# Patient Record
Sex: FEMALE | Race: WHITE | Employment: OTHER | URBAN - NONMETROPOLITAN AREA
[De-identification: names, ages, dates, MRNs, and addresses within clinical notes are randomized per-mention and may not be internally consistent; named-entity substitution may affect disease eponyms.]

---

## 2017-06-09 ENCOUNTER — OFFICE VISIT (OUTPATIENT)
Dept: PULMONOLOGY | Age: 57
End: 2017-06-09

## 2017-06-09 VITALS
DIASTOLIC BLOOD PRESSURE: 72 MMHG | HEIGHT: 65 IN | BODY MASS INDEX: 34.52 KG/M2 | HEART RATE: 85 BPM | WEIGHT: 207.2 LBS | SYSTOLIC BLOOD PRESSURE: 130 MMHG | OXYGEN SATURATION: 99 %

## 2017-06-09 DIAGNOSIS — J30.89 OTHER ALLERGIC RHINITIS: ICD-10-CM

## 2017-06-09 DIAGNOSIS — G47.33 OSA ON CPAP: ICD-10-CM

## 2017-06-09 DIAGNOSIS — G47.61 PLMD (PERIODIC LIMB MOVEMENT DISORDER): ICD-10-CM

## 2017-06-09 DIAGNOSIS — Z99.89 OSA ON CPAP: ICD-10-CM

## 2017-06-09 PROCEDURE — 99213 OFFICE O/P EST LOW 20 MIN: CPT | Performed by: INTERNAL MEDICINE

## 2017-06-09 RX ORDER — FLUTICASONE PROPIONATE 50 MCG
2 SPRAY, SUSPENSION (ML) NASAL DAILY
Qty: 3 BOTTLE | Refills: 3 | Status: SHIPPED | OUTPATIENT
Start: 2017-06-09 | End: 2018-06-10 | Stop reason: SDUPTHER

## 2017-09-14 ENCOUNTER — TELEPHONE (OUTPATIENT)
Dept: PULMONOLOGY | Age: 57
End: 2017-09-14

## 2017-09-14 DIAGNOSIS — Z99.89 OSA ON CPAP: Primary | ICD-10-CM

## 2017-09-14 DIAGNOSIS — G47.33 OSA ON CPAP: Primary | ICD-10-CM

## 2017-09-14 NOTE — TELEPHONE ENCOUNTER
Patient called in stating that she received a new machine which usue smaller tubing than her old machine, so would like to have her pressure increased because she does not feel that she is getting enough air with the current settings and tubing. Pt states is now using [facility].

## 2017-09-22 ENCOUNTER — OFFICE VISIT (OUTPATIENT)
Dept: PULMONOLOGY | Age: 57
End: 2017-09-22
Payer: COMMERCIAL

## 2017-09-22 VITALS
OXYGEN SATURATION: 97 % | BODY MASS INDEX: 33.66 KG/M2 | SYSTOLIC BLOOD PRESSURE: 128 MMHG | HEIGHT: 65 IN | HEART RATE: 84 BPM | DIASTOLIC BLOOD PRESSURE: 82 MMHG | WEIGHT: 202 LBS

## 2017-09-22 DIAGNOSIS — G47.33 OSA ON CPAP: ICD-10-CM

## 2017-09-22 DIAGNOSIS — G47.61 PLMD (PERIODIC LIMB MOVEMENT DISORDER): ICD-10-CM

## 2017-09-22 DIAGNOSIS — Z99.89 OSA ON CPAP: ICD-10-CM

## 2017-09-22 PROCEDURE — 99213 OFFICE O/P EST LOW 20 MIN: CPT | Performed by: INTERNAL MEDICINE

## 2017-09-22 RX ORDER — FLUCONAZOLE 150 MG/1
150 TABLET ORAL ONCE
COMMUNITY
End: 2018-09-24

## 2018-07-12 ENCOUNTER — HOSPITAL ENCOUNTER (OUTPATIENT)
Dept: WOMENS IMAGING | Age: 58
Discharge: HOME | End: 2018-07-12
Payer: COMMERCIAL

## 2018-07-12 DIAGNOSIS — Z12.39 BREAST SCREENING: ICD-10-CM

## 2018-07-12 PROCEDURE — 77067 SCR MAMMO BI INCL CAD: CPT

## 2018-07-12 PROCEDURE — 77080 DXA BONE DENSITY AXIAL: CPT

## 2018-09-24 ENCOUNTER — OFFICE VISIT (OUTPATIENT)
Dept: PULMONOLOGY | Age: 58
End: 2018-09-24
Payer: COMMERCIAL

## 2018-09-24 VITALS
BODY MASS INDEX: 34.16 KG/M2 | HEART RATE: 72 BPM | OXYGEN SATURATION: 96 % | HEIGHT: 65 IN | DIASTOLIC BLOOD PRESSURE: 82 MMHG | SYSTOLIC BLOOD PRESSURE: 128 MMHG | WEIGHT: 205 LBS

## 2018-09-24 DIAGNOSIS — G47.61 PLMD (PERIODIC LIMB MOVEMENT DISORDER): ICD-10-CM

## 2018-09-24 DIAGNOSIS — Z99.89 OBSTRUCTIVE SLEEP APNEA ON CPAP: Primary | ICD-10-CM

## 2018-09-24 DIAGNOSIS — G47.33 OBSTRUCTIVE SLEEP APNEA ON CPAP: Primary | ICD-10-CM

## 2018-09-24 DIAGNOSIS — J30.89 OTHER ALLERGIC RHINITIS: ICD-10-CM

## 2018-09-24 PROCEDURE — 99213 OFFICE O/P EST LOW 20 MIN: CPT | Performed by: PHYSICIAN ASSISTANT

## 2018-09-24 RX ORDER — FLUTICASONE PROPIONATE 50 MCG
SPRAY, SUSPENSION (ML) NASAL
Qty: 3 BOTTLE | Refills: 3 | Status: SHIPPED | OUTPATIENT
Start: 2018-09-24 | End: 2019-09-11 | Stop reason: SDUPTHER

## 2018-09-24 ASSESSMENT — ENCOUNTER SYMPTOMS
GASTROINTESTINAL NEGATIVE: 1
HEARTBURN: 0
COUGH: 0
RESPIRATORY NEGATIVE: 1
SORE THROAT: 0
SHORTNESS OF BREATH: 0
WHEEZING: 0
NAUSEA: 0
ORTHOPNEA: 0
EYES NEGATIVE: 1
SINUS PAIN: 0
SPUTUM PRODUCTION: 0

## 2018-09-24 NOTE — PROGRESS NOTES
[facility]      Sienna Raza                                         [id number]  9/24/2018   Chief Complaint   Patient presents with    Sleep Apnea     1 year DEBBIE follow up  with download        Pt of Dr. Arun Pardo    PAP Download:   Zandra Badillo or initial  AHI: 8.6    Date of initial study: 8/10/07     [x] Compliant  100%   []  Noncompliant 0 %     PAP Type CPAP   Level  13 cmH2o  Avg Hrs/Day 8:20  AHI: 1.9     Recorded compliance dates , 8/21/18 to 9/19/18  Machine/Mfg: ResMed   Interface: FFM    Provider:  [][facility]  [][facility]  [][facility]  [x][facility]         [][facility] []Other:   Neck Size: 15.5  Mallampati 3  ESS: 9    Here is a scan of the most recent download:              Presentation:   Ava Gandhi presents for sleep medicine follow up for obstructive sleep apnea  Since the last visit, Ava Gandhi is doing reasonably well with their sleep machine. Other comments: She is doing well with PAP. She is tolerating Sinemet for RLS. Equipment issues: The pressure is  acceptable, the mask is acceptable and she  is  using the humidity. Sleep issues:  Do you feel better? Yes  More rested? Yes   Better concentration? yes    Progress History:   Since last visit any new medical issues? Yes heart murmur  New ER or hospitlal visits? No  Any new or changes in medicines? No  Any new sleep medicines?  No        Past Medical History:   Diagnosis Date    Anemia     Backache     Depression     Diabetes mellitus ([address]     Eczema     Fibromyalgia     Fracture     Hyperlipidemia     Hypertension     Hypothyroidism     Metabolic syndrome     Migraine     Sinusitis     Sleep apnea        Past Surgical History:   Procedure Laterality Date    ARM SURGERY      BONE GRAFT  2007    Dr. Esteban Poag  2003    Dr. Nate Regalado    [facility] Dr. Christiano Tapia

## 2019-08-06 ENCOUNTER — HOSPITAL ENCOUNTER (OUTPATIENT)
Dept: WOMENS IMAGING | Age: 59
Discharge: HOME | End: 2019-08-06
Payer: COMMERCIAL

## 2019-08-06 DIAGNOSIS — Z12.31 VISIT FOR SCREENING MAMMOGRAM: ICD-10-CM

## 2019-08-06 PROCEDURE — 77067 SCR MAMMO BI INCL CAD: CPT

## 2019-08-09 ENCOUNTER — HOSPITAL ENCOUNTER (OUTPATIENT)
Dept: WOMENS IMAGING | Age: 59
Discharge: HOME | End: 2019-08-09
Payer: COMMERCIAL

## 2019-08-09 ENCOUNTER — APPOINTMENT (OUTPATIENT)
Dept: WOMENS IMAGING | Age: 59
End: 2019-08-09
Payer: COMMERCIAL

## 2019-08-09 DIAGNOSIS — R92.2 BREAST DENSITY: ICD-10-CM

## 2019-08-09 PROCEDURE — G0279 TOMOSYNTHESIS, MAMMO: HCPCS

## 2019-09-11 ENCOUNTER — OFFICE VISIT (OUTPATIENT)
Dept: PULMONOLOGY | Age: 59
End: 2019-09-11
Payer: COMMERCIAL

## 2019-09-11 VITALS
HEIGHT: 65 IN | OXYGEN SATURATION: 96 % | BODY MASS INDEX: 34.12 KG/M2 | HEART RATE: 87 BPM | SYSTOLIC BLOOD PRESSURE: 128 MMHG | DIASTOLIC BLOOD PRESSURE: 66 MMHG | WEIGHT: 204.8 LBS

## 2019-09-11 DIAGNOSIS — E66.9 OBESITY (BMI 30-39.9): ICD-10-CM

## 2019-09-11 DIAGNOSIS — G25.81 RESTLESS LEG SYNDROME: ICD-10-CM

## 2019-09-11 DIAGNOSIS — G47.33 OBSTRUCTIVE SLEEP APNEA ON CPAP: Primary | ICD-10-CM

## 2019-09-11 DIAGNOSIS — J30.89 OTHER ALLERGIC RHINITIS: ICD-10-CM

## 2019-09-11 DIAGNOSIS — Z99.89 OBSTRUCTIVE SLEEP APNEA ON CPAP: Primary | ICD-10-CM

## 2019-09-11 DIAGNOSIS — G47.61 PLMD (PERIODIC LIMB MOVEMENT DISORDER): ICD-10-CM

## 2019-09-11 PROCEDURE — 99213 OFFICE O/P EST LOW 20 MIN: CPT | Performed by: PHYSICIAN ASSISTANT

## 2019-09-11 RX ORDER — HYDROCORTISONE ACETATE 25 MG/1
25 SUPPOSITORY RECTAL 2 TIMES DAILY
COMMUNITY
End: 2020-01-29

## 2019-09-11 RX ORDER — LEVOTHYROXINE SODIUM 112 MCG
TABLET ORAL
COMMUNITY
Start: 2019-07-05 | End: 2021-11-17

## 2019-09-11 RX ORDER — FLUTICASONE PROPIONATE 50 MCG
SPRAY, SUSPENSION (ML) NASAL
Qty: 3 BOTTLE | Refills: 3 | Status: SHIPPED | OUTPATIENT
Start: 2019-09-11 | End: 2020-01-29

## 2019-09-11 RX ORDER — ISOSORBIDE MONONITRATE 30 MG/1
15 TABLET, EXTENDED RELEASE ORAL DAILY
COMMUNITY
Start: 2019-09-09

## 2019-09-11 RX ORDER — CYCLOSPORINE 0.5 MG/ML
1 EMULSION OPHTHALMIC 2 TIMES DAILY
COMMUNITY

## 2019-09-11 RX ORDER — VALSARTAN 80 MG/1
80 TABLET ORAL DAILY
COMMUNITY

## 2019-09-11 ASSESSMENT — ENCOUNTER SYMPTOMS
NAUSEA: 0
ALLERGIC/IMMUNOLOGIC NEGATIVE: 1
SHORTNESS OF BREATH: 0
EYES NEGATIVE: 1
DIARRHEA: 0
BACK PAIN: 0
COUGH: 0
CHEST TIGHTNESS: 0
WHEEZING: 0
STRIDOR: 0

## 2020-01-29 ENCOUNTER — APPOINTMENT (OUTPATIENT)
Dept: CT IMAGING | Age: 60
End: 2020-01-29
Payer: COMMERCIAL

## 2020-01-29 ENCOUNTER — APPOINTMENT (OUTPATIENT)
Dept: GENERAL RADIOLOGY | Age: 60
End: 2020-01-29
Payer: COMMERCIAL

## 2020-01-29 ENCOUNTER — HOSPITAL ENCOUNTER (OUTPATIENT)
Age: 60
Setting detail: OBSERVATION
Discharge: LEFT BEFORE DISCHARGE | End: 2020-01-30
Attending: EMERGENCY MEDICINE | Admitting: INTERNAL MEDICINE
Payer: COMMERCIAL

## 2020-01-29 PROBLEM — R29.810 FACIAL DROOP: Status: ACTIVE | Noted: 2020-01-29

## 2020-01-29 LAB
ANION GAP SERPL CALCULATED.3IONS-SCNC: 18 MEQ/L (ref 8–16)
APTT: 32.6 SECONDS (ref 22–38)
BASOPHILS # BLD: 1 %
BASOPHILS ABSOLUTE: 0.1 THOU/MM3 (ref 0–0.1)
BUN BLDV-MCNC: 10 MG/DL (ref 7–22)
CALCIUM SERPL-MCNC: 9.6 MG/DL (ref 8.5–10.5)
CHLORIDE BLD-SCNC: 101 MEQ/L (ref 98–111)
CO2: 21 MEQ/L (ref 23–33)
CREAT SERPL-MCNC: 0.5 MG/DL (ref 0.4–1.2)
EKG ATRIAL RATE: 85 BPM
EKG P AXIS: 52 DEGREES
EKG P-R INTERVAL: 186 MS
EKG Q-T INTERVAL: 410 MS
EKG QRS DURATION: 90 MS
EKG QTC CALCULATION (BAZETT): 487 MS
EKG R AXIS: 26 DEGREES
EKG T AXIS: 52 DEGREES
EKG VENTRICULAR RATE: 85 BPM
EOSINOPHIL # BLD: 6.8 %
EOSINOPHILS ABSOLUTE: 0.4 THOU/MM3 (ref 0–0.4)
ERYTHROCYTE [DISTWIDTH] IN BLOOD BY AUTOMATED COUNT: 13.4 % (ref 11.5–14.5)
ERYTHROCYTE [DISTWIDTH] IN BLOOD BY AUTOMATED COUNT: 47.6 FL (ref 35–45)
GFR SERPL CREATININE-BSD FRML MDRD: > 90 ML/MIN/1.73M2
GLUCOSE BLD-MCNC: 109 MG/DL (ref 70–108)
GLUCOSE BLD-MCNC: 132 MG/DL (ref 70–108)
GLUCOSE BLD-MCNC: 146 MG/DL (ref 70–108)
GLUCOSE BLD-MCNC: 97 MG/DL (ref 70–108)
HCT VFR BLD CALC: 44.2 % (ref 37–47)
HEMOGLOBIN: 14.5 GM/DL (ref 12–16)
IMMATURE GRANS (ABS): 0.01 THOU/MM3 (ref 0–0.07)
IMMATURE GRANULOCYTES: 0.2 %
INR BLD: 0.9 (ref 0.85–1.13)
LYMPHOCYTES # BLD: 30.3 %
LYMPHOCYTES ABSOLUTE: 1.6 THOU/MM3 (ref 1–4.8)
MCH RBC QN AUTO: 31.5 PG (ref 26–33)
MCHC RBC AUTO-ENTMCNC: 32.8 GM/DL (ref 32.2–35.5)
MCV RBC AUTO: 95.9 FL (ref 81–99)
MONOCYTES # BLD: 7.3 %
MONOCYTES ABSOLUTE: 0.4 THOU/MM3 (ref 0.4–1.3)
NUCLEATED RED BLOOD CELLS: 0 /100 WBC
OSMOLALITY CALCULATION: 281.1 MOSMOL/KG (ref 275–300)
PLATELET # BLD: 164 THOU/MM3 (ref 130–400)
PMV BLD AUTO: 9.3 FL (ref 9.4–12.4)
POTASSIUM SERPL-SCNC: 3.7 MEQ/L (ref 3.5–5.2)
RBC # BLD: 4.61 MILL/MM3 (ref 4.2–5.4)
SEG NEUTROPHILS: 54.4 %
SEGMENTED NEUTROPHILS ABSOLUTE COUNT: 2.8 THOU/MM3 (ref 1.8–7.7)
SODIUM BLD-SCNC: 140 MEQ/L (ref 135–145)
TROPONIN T: < 0.01 NG/ML
WBC # BLD: 5.2 THOU/MM3 (ref 4.8–10.8)

## 2020-01-29 PROCEDURE — 84484 ASSAY OF TROPONIN QUANT: CPT

## 2020-01-29 PROCEDURE — 80048 BASIC METABOLIC PNL TOTAL CA: CPT

## 2020-01-29 PROCEDURE — G0378 HOSPITAL OBSERVATION PER HR: HCPCS

## 2020-01-29 PROCEDURE — 99220 PR INITIAL OBSERVATION CARE/DAY 70 MINUTES: CPT | Performed by: INTERNAL MEDICINE

## 2020-01-29 PROCEDURE — 99285 EMERGENCY DEPT VISIT HI MDM: CPT

## 2020-01-29 PROCEDURE — 6370000000 HC RX 637 (ALT 250 FOR IP): Performed by: EMERGENCY MEDICINE

## 2020-01-29 PROCEDURE — 93005 ELECTROCARDIOGRAM TRACING: CPT | Performed by: EMERGENCY MEDICINE

## 2020-01-29 PROCEDURE — 70450 CT HEAD/BRAIN W/O DYE: CPT

## 2020-01-29 PROCEDURE — 93010 ELECTROCARDIOGRAM REPORT: CPT | Performed by: NUCLEAR MEDICINE

## 2020-01-29 PROCEDURE — 36415 COLL VENOUS BLD VENIPUNCTURE: CPT

## 2020-01-29 PROCEDURE — 85610 PROTHROMBIN TIME: CPT

## 2020-01-29 PROCEDURE — 71045 X-RAY EXAM CHEST 1 VIEW: CPT

## 2020-01-29 PROCEDURE — 85025 COMPLETE CBC W/AUTO DIFF WBC: CPT

## 2020-01-29 PROCEDURE — 85730 THROMBOPLASTIN TIME PARTIAL: CPT

## 2020-01-29 PROCEDURE — 82948 REAGENT STRIP/BLOOD GLUCOSE: CPT

## 2020-01-29 RX ORDER — GABAPENTIN 300 MG/1
600 CAPSULE ORAL
Status: DISCONTINUED | OUTPATIENT
Start: 2020-01-30 | End: 2020-01-29 | Stop reason: CLARIF

## 2020-01-29 RX ORDER — GABAPENTIN 300 MG/1
600 CAPSULE ORAL 3 TIMES DAILY
Status: DISCONTINUED | OUTPATIENT
Start: 2020-01-30 | End: 2020-01-30 | Stop reason: HOSPADM

## 2020-01-29 RX ORDER — ASPIRIN 81 MG/1
324 TABLET, CHEWABLE ORAL ONCE
Status: COMPLETED | OUTPATIENT
Start: 2020-01-29 | End: 2020-01-29

## 2020-01-29 RX ORDER — BUPROPION HYDROCHLORIDE 300 MG/1
300 TABLET ORAL EVERY MORNING
Status: DISCONTINUED | OUTPATIENT
Start: 2020-01-30 | End: 2020-01-30 | Stop reason: HOSPADM

## 2020-01-29 RX ORDER — HYDROCODONE BITARTRATE AND ACETAMINOPHEN 5; 325 MG/1; MG/1
1 TABLET ORAL EVERY 6 HOURS PRN
Status: DISCONTINUED | OUTPATIENT
Start: 2020-01-29 | End: 2020-01-30 | Stop reason: HOSPADM

## 2020-01-29 RX ORDER — OYSTER SHELL CALCIUM WITH VITAMIN D 500; 200 MG/1; [IU]/1
1 TABLET, FILM COATED ORAL DAILY
Status: DISCONTINUED | OUTPATIENT
Start: 2020-01-30 | End: 2020-01-30 | Stop reason: HOSPADM

## 2020-01-29 RX ORDER — ASPIRIN 300 MG/1
300 SUPPOSITORY RECTAL DAILY
Status: DISCONTINUED | OUTPATIENT
Start: 2020-01-30 | End: 2020-01-30 | Stop reason: HOSPADM

## 2020-01-29 RX ORDER — MINERAL OIL AND WHITE PETROLATUM 150; 830 MG/G; MG/G
OINTMENT OPHTHALMIC 4 TIMES DAILY
Status: DISCONTINUED | OUTPATIENT
Start: 2020-01-29 | End: 2020-01-30 | Stop reason: HOSPADM

## 2020-01-29 RX ORDER — SODIUM CHLORIDE 0.9 % (FLUSH) 0.9 %
10 SYRINGE (ML) INJECTION PRN
Status: DISCONTINUED | OUTPATIENT
Start: 2020-01-29 | End: 2020-01-30 | Stop reason: HOSPADM

## 2020-01-29 RX ORDER — TIZANIDINE 4 MG/1
4 TABLET ORAL EVERY 8 HOURS PRN
Status: DISCONTINUED | OUTPATIENT
Start: 2020-01-29 | End: 2020-01-30 | Stop reason: HOSPADM

## 2020-01-29 RX ORDER — ATENOLOL 50 MG/1
50 TABLET ORAL DAILY
Status: DISCONTINUED | OUTPATIENT
Start: 2020-01-30 | End: 2020-01-30 | Stop reason: HOSPADM

## 2020-01-29 RX ORDER — SIMVASTATIN 20 MG
20 TABLET ORAL NIGHTLY
Status: DISCONTINUED | OUTPATIENT
Start: 2020-01-29 | End: 2020-01-29

## 2020-01-29 RX ORDER — SODIUM CHLORIDE 0.9 % (FLUSH) 0.9 %
10 SYRINGE (ML) INJECTION EVERY 12 HOURS SCHEDULED
Status: DISCONTINUED | OUTPATIENT
Start: 2020-01-29 | End: 2020-01-30 | Stop reason: HOSPADM

## 2020-01-29 RX ORDER — ONDANSETRON 2 MG/ML
4 INJECTION INTRAMUSCULAR; INTRAVENOUS EVERY 6 HOURS PRN
Status: DISCONTINUED | OUTPATIENT
Start: 2020-01-29 | End: 2020-01-30 | Stop reason: HOSPADM

## 2020-01-29 RX ORDER — CETIRIZINE HYDROCHLORIDE 10 MG/1
10 TABLET ORAL DAILY
Status: DISCONTINUED | OUTPATIENT
Start: 2020-01-30 | End: 2020-01-30

## 2020-01-29 RX ORDER — ASPIRIN 81 MG/1
81 TABLET ORAL DAILY
Status: DISCONTINUED | OUTPATIENT
Start: 2020-01-30 | End: 2020-01-30 | Stop reason: HOSPADM

## 2020-01-29 RX ORDER — VALSARTAN 80 MG/1
80 TABLET ORAL DAILY
Status: DISCONTINUED | OUTPATIENT
Start: 2020-01-30 | End: 2020-01-29 | Stop reason: CLARIF

## 2020-01-29 RX ORDER — POLYVINYL ALCOHOL 14 MG/ML
1 SOLUTION/ DROPS OPHTHALMIC EVERY 6 HOURS PRN
Status: DISCONTINUED | OUTPATIENT
Start: 2020-01-29 | End: 2020-01-29 | Stop reason: CLARIF

## 2020-01-29 RX ORDER — LEVOTHYROXINE SODIUM 112 UG/1
112 TABLET ORAL DAILY
Status: DISCONTINUED | OUTPATIENT
Start: 2020-01-29 | End: 2020-01-30 | Stop reason: HOSPADM

## 2020-01-29 RX ORDER — LOSARTAN POTASSIUM 50 MG/1
50 TABLET ORAL DAILY
Status: DISCONTINUED | OUTPATIENT
Start: 2020-01-30 | End: 2020-01-30 | Stop reason: HOSPADM

## 2020-01-29 RX ORDER — ISOSORBIDE MONONITRATE 30 MG/1
15 TABLET, EXTENDED RELEASE ORAL DAILY
Status: DISCONTINUED | OUTPATIENT
Start: 2020-01-30 | End: 2020-01-30 | Stop reason: HOSPADM

## 2020-01-29 RX ORDER — CLOPIDOGREL BISULFATE 75 MG/1
75 TABLET ORAL DAILY
COMMUNITY

## 2020-01-29 RX ORDER — CLOPIDOGREL BISULFATE 75 MG/1
75 TABLET ORAL DAILY
Status: DISCONTINUED | OUTPATIENT
Start: 2020-01-30 | End: 2020-01-30 | Stop reason: HOSPADM

## 2020-01-29 RX ORDER — DEXTROSE MONOHYDRATE 50 MG/ML
100 INJECTION, SOLUTION INTRAVENOUS PRN
Status: DISCONTINUED | OUTPATIENT
Start: 2020-01-29 | End: 2020-01-30 | Stop reason: HOSPADM

## 2020-01-29 RX ORDER — VITAMIN B COMPLEX
1000 TABLET ORAL DAILY
Status: DISCONTINUED | OUTPATIENT
Start: 2020-01-29 | End: 2020-01-30 | Stop reason: HOSPADM

## 2020-01-29 RX ORDER — GABAPENTIN 300 MG/1
300 CAPSULE ORAL ONCE
Status: COMPLETED | OUTPATIENT
Start: 2020-01-30 | End: 2020-01-30

## 2020-01-29 RX ORDER — POTASSIUM CHLORIDE 750 MG/1
10 TABLET, FILM COATED, EXTENDED RELEASE ORAL 2 TIMES DAILY
Status: DISCONTINUED | OUTPATIENT
Start: 2020-01-30 | End: 2020-01-30 | Stop reason: HOSPADM

## 2020-01-29 RX ORDER — NICOTINE POLACRILEX 4 MG
15 LOZENGE BUCCAL PRN
Status: DISCONTINUED | OUTPATIENT
Start: 2020-01-29 | End: 2020-01-30 | Stop reason: HOSPADM

## 2020-01-29 RX ORDER — DEXTROSE MONOHYDRATE 25 G/50ML
12.5 INJECTION, SOLUTION INTRAVENOUS PRN
Status: DISCONTINUED | OUTPATIENT
Start: 2020-01-29 | End: 2020-01-30 | Stop reason: HOSPADM

## 2020-01-29 RX ORDER — POTASSIUM CHLORIDE 750 MG/1
10 TABLET, FILM COATED, EXTENDED RELEASE ORAL DAILY
COMMUNITY

## 2020-01-29 RX ORDER — GABAPENTIN 300 MG/1
300 CAPSULE ORAL 4 TIMES DAILY
Status: DISCONTINUED | OUTPATIENT
Start: 2020-01-29 | End: 2020-01-29 | Stop reason: CLARIF

## 2020-01-29 RX ORDER — GABAPENTIN 600 MG/1
300 TABLET ORAL 3 TIMES DAILY
Status: DISCONTINUED | OUTPATIENT
Start: 2020-01-29 | End: 2020-01-29

## 2020-01-29 RX ORDER — GABAPENTIN 100 MG/1
100 CAPSULE ORAL ONCE
Status: DISCONTINUED | OUTPATIENT
Start: 2020-01-29 | End: 2020-01-29

## 2020-01-29 RX ADMIN — ASPIRIN 81 MG 324 MG: 81 TABLET ORAL at 17:16

## 2020-01-29 ASSESSMENT — ENCOUNTER SYMPTOMS
VOMITING: 0
SHORTNESS OF BREATH: 0
ABDOMINAL PAIN: 0
NAUSEA: 0

## 2020-01-29 NOTE — ED TRIAGE NOTES
Pt presents to the ED with bryan for left sided facial droop. Son states he noticed pt's left sided facial droop around 1520 today. Son states pt's did not have any another symptoms at that time. Upon arrival  Family states pt is back to her normal. Pt states that when her son noticed the facial droop her left side of her face felt \"puffy\". Pt states her left side of her face feels less \"puffy\" at this time. Pt states she had these same symptoms in October 2019 but was not seen. Pt states she has a history of Bell's Palsy. Pt denies any pain at this time.

## 2020-01-29 NOTE — ED NOTES
Pt able to ambulate to bathroom without difficulty. Pt denies pain. Pt denies \"puffy\" feeling on the left side of her face. EKG completed at this time.  at bedside.      Jeramy Cox RN  01/29/20 7298

## 2020-01-29 NOTE — ED PROVIDER NOTES
negative. PAST MEDICAL HISTORY    has a past medical history of Anemia, Backache, Depression, Diabetes mellitus ([address] Eczema, Fibromyalgia, Fracture, Hyperlipidemia, Hypertension, Hypothyroidism, Metabolic syndrome, Migraine, Sinusitis, and Sleep apnea. SURGICAL HISTORY      has a past surgical history that includes Tonsillectomy (1962); Cholecystectomy (2003); turbinate resection (1991); other surgical history (2000); bone graft (2007); Dilation and curettage of uterus; Carpal tunnel release; LEEP; and Arm Surgery. CURRENT MEDICATIONS       Current Discharge Medication List      CONTINUE these medications which have NOT CHANGED    Details   Dulaglutide (TRULICITY) 1.5 YL/9.6DT SOPN Inject 1.5 mg into the skin once a week Next dose due 1/30      clopidogrel (PLAVIX) 75 MG tablet Take 75 mg by mouth daily      aspirin 81 MG tablet Take 81 mg by mouth daily      potassium chloride (KLOR-CON 10) 10 MEQ extended release tablet Take 10 mEq by mouth daily At nighttime      isosorbide mononitrate (IMDUR) 30 MG extended release tablet Take 15 mg by mouth daily Take in the morning (1/2 tablet)      SYNTHROID 112 MCG tablet       PRALUENT 75 MG/ML SOPN injection pen INJECT 1 SUBCUTANEOUSLY EVERY OTHER WEEK  Refills: 3      cycloSPORINE (RESTASIS) 0.05 % ophthalmic emulsion 1 drop 2 times daily      valsartan (DIOVAN) 80 MG tablet Take 80 mg by mouth daily      carbidopa-levodopa (SINEMET)  MG per tablet Take 1 tablet by mouth nightly  Qty: 90 tablet, Refills: 3    Associated Diagnoses: PLMD (periodic limb movement disorder)      pitavastatin (LIVALO) 2 MG TABS tablet Take by mouth nightly      CPAP Machine MISC by Does not apply route Please increase her CPAP pressure to 13 cm H20. She is complaining of insufficient pressure on current settings of CPAP pressure of 11cm H20. Please check her CPAP for proper functioning.   Qty: 1 each, Refills: 0    Associated Diagnoses: DEBBIE on CPAP Chlorpheniramine-Phenylephrine 4-10 MG TABS Take by mouth      Respiratory Therapy Supplies (ADULT MASK) MISC Please do mask refit and provide mask of patient's choice. She is interested in Full face mask to minimize leaks. I recommend Ruchi view mask. Qty: 1 each, Refills: 0    Associated Diagnoses: DEBBIE on CPAP      butalbital-acetaminophen-caffeine (FIORICET, ESGIC) -40 MG per tablet Take 1 tablet by mouth every 4 hours as needed for Headaches      canagliflozin (INVOKANA) 300 MG TABS tablet Take 300 mg by mouth every morning (before breakfast)      Probiotic Product (PROBIOTIC DAILY PO) Take by mouth      tiZANidine (ZANAFLEX) 4 MG tablet Take 4 mg by mouth every 8 hours as needed      Multiple Vitamins-Minerals (OCUVITE ADULT 50+) CAPS Take by mouth daily      artificial tears (ARTIFICIAL TEARS) OINT 4 times daily    Associated Diagnoses: Acute maxillary sinusitis; Bilateral acute serous otitis media      gabapentin (NEURONTIN) 300 MG capsule Take 300 mg by mouth 4 times daily. 1 tablet in the morning, 2 at lunch, 1 at dinner, 1 at nighttime      buPROPion (WELLBUTRIN XL) 150 MG XL tablet Take 300 mg by mouth every morning. metformin (GLUCOPHAGE XR) 500 MG XR tablet Take 1 tablet by mouth daily (with breakfast) for 30 days. Qty: 30 tablet, Refills: 3      atenolol (TENORMIN) 50 MG tablet Take 50 mg by mouth daily. Multiple Vitamin (MULTI-VITAMIN PO) Take  by mouth daily. B Complex Vitamins (VITAMIN B COMPLEX PO) Take  by mouth daily. Calcium Carbonate-Vitamin D (CALCIUM 600 + D) 600-400 MG-UNIT TABS Take 1 tablet by mouth daily. Flaxseed, Linseed, (FLAXSEED OIL) 1000 MG CAPS Take  by mouth daily. Vitamin D (CHOLECALCIFEROL) 1000 UNIT CAPS capsule Take 1,000 Units by mouth daily.         HYDROcodone-acetaminophen (NORCO) 5-325 MG per tablet Take 1 tablet by mouth every 6 hours as needed for Pain             ALLERGIES     is allergic to aspirin; fluconazole; lipitor on 1/29/2020 4:09 PM      MRI BRAIN WO CONTRAST    (Results Pending)     [x] Visualized and interpreted by me   [x] Radiologist's Wet Read Report Reviewed   [] Discussed withRadiologist.    LABS:   Labs Reviewed   CBC WITH AUTO DIFFERENTIAL - Abnormal; Notable for the following components:       Result Value    RDW-SD 47.6 (*)     MPV 9.3 (*)     All other components within normal limits   BASIC METABOLIC PANEL - Abnormal; Notable for the following components:    CO2 21 (*)     Glucose 146 (*)     All other components within normal limits   ANION GAP - Abnormal; Notable for the following components:    Anion Gap 18.0 (*)     All other components within normal limits   CBC - Abnormal; Notable for the following components:    WBC 4.5 (*)     RDW-SD 45.6 (*)     MPV 9.1 (*)     All other components within normal limits   POCT GLUCOSE - Abnormal; Notable for the following components:    POC Glucose 132 (*)     All other components within normal limits   POCT GLUCOSE - Abnormal; Notable for the following components:    POC Glucose 109 (*)     All other components within normal limits   POCT GLUCOSE - Abnormal; Notable for the following components:    POC Glucose 154 (*)     All other components within normal limits   TROPONIN   PROTIME-INR   APTT   OSMOLALITY   GLOMERULAR FILTRATION RATE, ESTIMATED   HEMOGLOBIN A1C   LIPID PANEL   POCT GLUCOSE   POCT GLUCOSE       EMERGENCY DEPARTMENT COURSE:   Vitals:    Vitals:    01/29/20 2342 01/30/20 0416 01/30/20 0830 01/30/20 0917   BP: 128/68 122/66 120/60    Pulse: 84 79 78    Resp: 16 15 16    Temp: 98.4 °F (36.9 °C) 97.9 °F (36.6 °C) 98.5 °F (36.9 °C)    TempSrc: Oral Oral Oral    SpO2: 96% 96% 96% 96%   Weight:  200 lb 8 oz (90.9 kg)     Height:  5' 5\" (1.651 m)       Patient's symptoms resolved per patient and family a few minutes after she arrived in the ED and prior to my evaluation. At the time of my evaluation, her NIH is 0 and she is not a tPA candidate.   Her reported

## 2020-01-30 ENCOUNTER — APPOINTMENT (OUTPATIENT)
Dept: MRI IMAGING | Age: 60
End: 2020-01-30
Payer: COMMERCIAL

## 2020-01-30 VITALS
DIASTOLIC BLOOD PRESSURE: 61 MMHG | OXYGEN SATURATION: 96 % | WEIGHT: 200.5 LBS | HEART RATE: 74 BPM | TEMPERATURE: 98.3 F | SYSTOLIC BLOOD PRESSURE: 118 MMHG | RESPIRATION RATE: 18 BRPM | HEIGHT: 65 IN | BODY MASS INDEX: 33.41 KG/M2

## 2020-01-30 LAB
AVERAGE GLUCOSE: 114 MG/DL (ref 70–126)
CHOLESTEROL, TOTAL: 111 MG/DL (ref 100–199)
ERYTHROCYTE [DISTWIDTH] IN BLOOD BY AUTOMATED COUNT: 13.3 % (ref 11.5–14.5)
ERYTHROCYTE [DISTWIDTH] IN BLOOD BY AUTOMATED COUNT: 45.6 FL (ref 35–45)
GLUCOSE BLD-MCNC: 101 MG/DL (ref 70–108)
GLUCOSE BLD-MCNC: 101 MG/DL (ref 70–108)
GLUCOSE BLD-MCNC: 112 MG/DL (ref 70–108)
GLUCOSE BLD-MCNC: 154 MG/DL (ref 70–108)
HBA1C MFR BLD: 5.8 % (ref 4.4–6.4)
HCT VFR BLD CALC: 42.4 % (ref 37–47)
HDLC SERPL-MCNC: 50 MG/DL
HEMOGLOBIN: 13.9 GM/DL (ref 12–16)
LDL CHOLESTEROL CALCULATED: 42 MG/DL
LV EF: 60 %
LVEF MODALITY: NORMAL
MCH RBC QN AUTO: 30.7 PG (ref 26–33)
MCHC RBC AUTO-ENTMCNC: 32.8 GM/DL (ref 32.2–35.5)
MCV RBC AUTO: 93.6 FL (ref 81–99)
PLATELET # BLD: 145 THOU/MM3 (ref 130–400)
PMV BLD AUTO: 9.1 FL (ref 9.4–12.4)
RBC # BLD: 4.53 MILL/MM3 (ref 4.2–5.4)
TRIGL SERPL-MCNC: 95 MG/DL (ref 0–199)
WBC # BLD: 4.5 THOU/MM3 (ref 4.8–10.8)

## 2020-01-30 PROCEDURE — 70551 MRI BRAIN STEM W/O DYE: CPT

## 2020-01-30 PROCEDURE — 83036 HEMOGLOBIN GLYCOSYLATED A1C: CPT

## 2020-01-30 PROCEDURE — 97161 PT EVAL LOW COMPLEX 20 MIN: CPT

## 2020-01-30 PROCEDURE — 94760 N-INVAS EAR/PLS OXIMETRY 1: CPT

## 2020-01-30 PROCEDURE — 2709999900 HC NON-CHARGEABLE SUPPLY

## 2020-01-30 PROCEDURE — 36415 COLL VENOUS BLD VENIPUNCTURE: CPT

## 2020-01-30 PROCEDURE — G0378 HOSPITAL OBSERVATION PER HR: HCPCS

## 2020-01-30 PROCEDURE — 82948 REAGENT STRIP/BLOOD GLUCOSE: CPT

## 2020-01-30 PROCEDURE — 99217 PR OBSERVATION CARE DISCHARGE MANAGEMENT: CPT | Performed by: INTERNAL MEDICINE

## 2020-01-30 PROCEDURE — 2580000003 HC RX 258: Performed by: INTERNAL MEDICINE

## 2020-01-30 PROCEDURE — 92523 SPEECH SOUND LANG COMPREHEN: CPT

## 2020-01-30 PROCEDURE — 85027 COMPLETE CBC AUTOMATED: CPT

## 2020-01-30 PROCEDURE — 96372 THER/PROPH/DIAG INJ SC/IM: CPT

## 2020-01-30 PROCEDURE — 6370000000 HC RX 637 (ALT 250 FOR IP): Performed by: PHYSICIAN ASSISTANT

## 2020-01-30 PROCEDURE — 80061 LIPID PANEL: CPT

## 2020-01-30 PROCEDURE — 6360000002 HC RX W HCPCS: Performed by: INTERNAL MEDICINE

## 2020-01-30 PROCEDURE — 6370000000 HC RX 637 (ALT 250 FOR IP): Performed by: INTERNAL MEDICINE

## 2020-01-30 PROCEDURE — 93306 TTE W/DOPPLER COMPLETE: CPT

## 2020-01-30 PROCEDURE — 97129 THER IVNTJ 1ST 15 MIN: CPT

## 2020-01-30 PROCEDURE — 92610 EVALUATE SWALLOWING FUNCTION: CPT

## 2020-01-30 PROCEDURE — 99244 OFF/OP CNSLTJ NEW/EST MOD 40: CPT | Performed by: PSYCHIATRY & NEUROLOGY

## 2020-01-30 RX ORDER — CETIRIZINE HYDROCHLORIDE 10 MG/1
10 TABLET ORAL DAILY
Status: DISCONTINUED | OUTPATIENT
Start: 2020-01-30 | End: 2020-01-30 | Stop reason: HOSPADM

## 2020-01-30 RX ORDER — BUTALBITAL, ACETAMINOPHEN AND CAFFEINE 50; 325; 40 MG/1; MG/1; MG/1
1 TABLET ORAL EVERY 4 HOURS PRN
Status: DISCONTINUED | OUTPATIENT
Start: 2020-01-30 | End: 2020-01-30 | Stop reason: HOSPADM

## 2020-01-30 RX ADMIN — Medication 10 ML: at 08:56

## 2020-01-30 RX ADMIN — CARBIDOPA AND LEVODOPA 1 TABLET: 10; 100 TABLET ORAL at 00:03

## 2020-01-30 RX ADMIN — GABAPENTIN 600 MG: 300 CAPSULE ORAL at 08:54

## 2020-01-30 RX ADMIN — GABAPENTIN 600 MG: 300 CAPSULE ORAL at 00:03

## 2020-01-30 RX ADMIN — BUTALBITAL, ACETAMINOPHEN, AND CAFFEINE 1 TABLET: 50; 325; 40 TABLET ORAL at 14:15

## 2020-01-30 RX ADMIN — Medication 10 ML: at 00:04

## 2020-01-30 RX ADMIN — ISOSORBIDE MONONITRATE 15 MG: 30 TABLET ORAL at 08:51

## 2020-01-30 RX ADMIN — ATENOLOL 50 MG: 50 TABLET ORAL at 08:55

## 2020-01-30 RX ADMIN — POTASSIUM CHLORIDE 10 MEQ: 750 TABLET, FILM COATED, EXTENDED RELEASE ORAL at 00:08

## 2020-01-30 RX ADMIN — ASPIRIN 81 MG: 81 TABLET ORAL at 08:51

## 2020-01-30 RX ADMIN — LEVOTHYROXINE SODIUM 112 MCG: 112 TABLET ORAL at 06:13

## 2020-01-30 RX ADMIN — CLOPIDOGREL BISULFATE 75 MG: 75 TABLET ORAL at 08:51

## 2020-01-30 RX ADMIN — VITAMIN D, TAB 1000IU (100/BT) 1000 UNITS: 25 TAB at 08:52

## 2020-01-30 RX ADMIN — CALCIUM CARBONATE-VITAMIN D TAB 500 MG-200 UNIT 1 TABLET: 500-200 TAB at 08:54

## 2020-01-30 RX ADMIN — ENOXAPARIN SODIUM 40 MG: 40 INJECTION SUBCUTANEOUS at 08:57

## 2020-01-30 RX ADMIN — GABAPENTIN 600 MG: 300 CAPSULE ORAL at 14:15

## 2020-01-30 RX ADMIN — BUPROPION HYDROCHLORIDE 300 MG: 300 TABLET, EXTENDED RELEASE ORAL at 08:59

## 2020-01-30 RX ADMIN — POTASSIUM CHLORIDE 10 MEQ: 750 TABLET, FILM COATED, EXTENDED RELEASE ORAL at 08:51

## 2020-01-30 RX ADMIN — LOSARTAN POTASSIUM 50 MG: 50 TABLET, FILM COATED ORAL at 08:52

## 2020-01-30 RX ADMIN — CETIRIZINE HYDROCHLORIDE 10 MG: 10 TABLET, FILM COATED ORAL at 00:09

## 2020-01-30 RX ADMIN — GABAPENTIN 300 MG: 300 CAPSULE ORAL at 00:04

## 2020-01-30 ASSESSMENT — ENCOUNTER SYMPTOMS
SORE THROAT: 0
COUGH: 0
RHINORRHEA: 0

## 2020-01-30 ASSESSMENT — PAIN SCALES - GENERAL: PAINLEVEL_OUTOF10: 5

## 2020-01-30 NOTE — H&P
Alcohol use: Yes     Alcohol/week: 0.0 standard drinks     Comment: one glass every week or two    Drug use: No       FHX:   Family History   Problem Relation Age of Onset    Arthritis Mother     High Blood Pressure Mother     Heart Disease Father     Stroke Father     Diabetes Sister        Allergies: Allergies   Allergen Reactions    Aspirin Other (See Comments)     Hearing?  Fluconazole     Lipitor [Atorvastatin Calcium] Other (See Comments)     Memory loss 8521-0880    Mepergan [Meperidine-Promethazine]     Motrin [Ibuprofen Micronized]     Viibryd [Vilazodone Hcl] Other (See Comments)    Adhesive Tape Rash     Some of them       Medications:       lubrifresh P.M. Ophthalmic 4x Daily    [START ON 1/30/2020] buPROPion  300 mg Oral QAM    [START ON 1/30/2020] calcium-vitamin D  1 tablet Oral Daily    carbidopa-levodopa  1 tablet Oral Nightly    levothyroxine  112 mcg Oral Daily    Vitamin D  1,000 Units Oral Daily    sodium chloride flush  10 mL Intravenous 2 times per day    [START ON 1/30/2020] enoxaparin  40 mg Subcutaneous Q24H    [START ON 1/30/2020] aspirin  81 mg Oral Daily    Or    [START ON 1/30/2020] aspirin  300 mg Rectal Daily    [START ON 1/30/2020] clopidogrel  75 mg Oral Daily    [START ON 1/30/2020] atenolol  50 mg Oral Daily    [START ON 1/30/2020] canagliflozin  300 mg Oral QAM AC    [START ON 1/30/2020] isosorbide mononitrate  15 mg Oral Daily    [START ON 1/30/2020] valsartan  80 mg Oral Daily       Vital Signs:   /77   Pulse 83   Temp 97.6 °F (36.4 °C) (Oral)   Resp 16   Ht 5' 5\" (1.651 m)   Wt 200 lb 15.2 oz (91.2 kg)   LMP 11/04/2015   SpO2 98%   BMI 33.44 kg/m²    No intake or output data in the 24 hours ending 01/29/20 2122     General:   Sitting in bed  HEENT:  normocephalic and atraumatic. No scleral icterus. PERR. Neck: supple. No JVD. No thyromegaly. Lungs: clear to auscultation. No retractions  Cardiac: RRR without murmur.   Abdomen: soft.  Nontender. Bowel sounds positive. Extremities:  No clubbing, cyanosis, or edema x 4. Vasculature: capillary refill < 3 seconds. Palpable LE pulses bilaterally. Skin:  warm and dry. Psych:  Alert and oriented x3. Affect appropriate  Lymph:  No supraclavicular adenopathy. Neurologic:  No focal deficit. No seizures. Data: (All radiographs, tracings, PFTs, and imaging are personally viewed and interpreted unless otherwise noted).    CT head per radiology with no acute findings  EKG showing normal sinus rhythm    Electronically signed by Kristofer Michelle MD on 1/29/2020 at 9:22 PM

## 2020-01-30 NOTE — PROGRESS NOTES
scored 26/30. Normal is greater than or equal to 26/30. Orientation: 6/6  Immediate Recall: 5/5  Short-Term Recall: 3/5 IND, 2/5 min A via discriptive cue  Divergent Namin WPM  Problem Solvin/6 IND  Attention: 3/3 IND  Executive Functionin/5 IND    SWALLOWING:  Current Diet: Regular solids/thin liquids   WNL       RECOMMENDATIONS/ASSESSMENT:  DIAGNOSTIC IMPRESSIONS:  Patient seen with RN permission on this date. The pt presented with mild cognitive linguistic deficits in the domains of STM via immediate and delayed recall. The pt demonstrated adequate completion with 100% accuracy of 4/4 working memory tasks. Per further discussion with the pt and her , she has jero experiencing STM deficits for several months--Pt contribute to new medication. Although suspected not a acute changes, recommended continued skilled ST while an inpatient as well as upon d/c from skilled ST. Pt seen with RN permission on this date. Pt's oral and pharyngeal phase of the swallow appear to be within normal limits. Pt is demonstrate good bolus control/formation, adequate AP movement, timely swallow onset, adequate laryngeal elevation upon palpation, and no overt s/s of aspiration/penetration across all trials on this date. Pt is adequate for d/c from skilled ST for dysphagia as of 2020. Please re-consult if the pt's medical status changes. Rehabilitation Potential: excellent    EDUCATION:  Learner: Patient  Education:  Reviewed results and recommendations of this evaluation, Reviewed signs, symptoms and risks of aspiration, Reviewed ST goals and Plan of Care and Reviewed recommendations for follow-up  Evaluation of Education: Verbalizes understanding    PLAN:  Skilled SLP intervention on acute care 3-5 x per week or until goals met and/or pt plateaus in function. Specific interventions for next session may include: STM . PATIENT GOAL:    Return to prior level of function.     SHORT TERM GOALS:  Short-term Goals  Timeframe for Short-term Goals: 2 weeks   Goal 1: Pt will completed STM (immediate & delayed recall) with 90% accuracy given min A to improve retention of newly leaarned information. LONG TERM GOALS:  No LTM Goals recommended, due to anticipated short ELOS. INTERVENTION:   Completed review of STM strategies using the acronym WRAP--Write it down, Repeat it, Associate it, and Pictures it. *Handout provided  *Completed rehearsal and practice of each compensatory memory strategies  *Pt highly motivated    Completed review FAST for stroke education:  F:Face-facial droop, numb, tingling   A:Arm-weakness, numb, tinging  S: Speech-slurred, aphasia, word finding, etc.   T: Time- Don't wait, get to the hospital; CALL 911    Pt and her  took note, and was able to recall/demonstrate understanding. PT was agreebale to participate in skilled ST for STM (imemdiate and delayed recall)       Hayley Ewing MA., SARTHAK-FRANKE

## 2020-01-30 NOTE — PROGRESS NOTES
[address] [address]  INPATIENT PHYSICAL THERAPY  EVALUATION  [facility] 4A - [id number]    Time In: 1549  Time Out: 7068  Timed Code Treatment Minutes: 0 Minutes  Minutes: 10          Date: 2020  Patient Name: Jonny Clarke,  Gender:  female        MRN: [id number]  : 1960  (62 y.o.)  Referral Date : 20   Referring Practitioner: Pa Garay MD  Diagnosis: Facial droop  Additional Pertinent Hx: 61 y/o female admitted with left sided facial droop and numbness/tingling. Head CT was negative. Restrictions/Precautions:  Restrictions/Precautions: Up as Tolerated    Subjective:  Chart Reviewed: Yes  Patient assessed for rehabilitation services?: Yes  Family / Caregiver Present: Yes  Subjective: RN approved PT session. Patient sitting EOB upon PT arrival, pleasant and agreeable to therapy. General:  Follows Commands: Within Functional Limits                   Pain:  Denies. Social/Functional History:    Lives With: Spouse  Home Layout: One level  Home Access: Stairs to enter without rails  Entrance Stairs - Number of Steps: 2  Home Equipment: Cane     Bathroom Shower/Tub: Tub/Shower unit, Walk-in shower       ADL Assistance: Independent  Homemaking Assistance: Independent  Ambulation Assistance: Independent  Transfer Assistance: Independent    Active : Yes     Additional Comments: Pt recently started going to the  to get some exercise. OBJECTIVE:  Bed Mobility:  Not Tested    Transfers:  Sit to Stand: Independent  Stand to Sit:Independent    Ambulation:  Independent  Distance: 200'  Surface: Level Tile  Device:No Device  Gait Deviations:  Decreased Gait Speed      ASSESSMENT:  Activity Tolerance:  Patient tolerance of  treatment: good. Treatment Initiated: No treatment initiated    Assessment:  Assessment: Patient tolerated PT session well. She is able to complete mobility independently with no strength or balance deficits noted.   She is at her baseline function and does not demo the need for skilled PT services. REQUIRES PT FOLLOW UP: No  No Skilled PT: Independent with functional mobility     Discharge Recommendations:  Discharge Recommendations: Home independently    Patient Education:Plan of Care    Equipment Recommendations:  Equipment Needed: No    Plan:  Times per week: D/C PT    Goals:  Patient goals : Go home  Short term goals  Time Frame for Short term goals: n/a  Long term goals  Time Frame for Long term goals : n/a       AM-PAC Inpatient Mobility without Stair Climbing Raw Score : 20  AM-PAC Inpatient without Stair Climbing T-Scale Score : 60.57  Mobility Inpatient CMS 0-100% Score: 0  Mobility Inpatient without Stair CMS G-Code Modifier : CH    Following session, patient left in safe position with all fall risk precautions in place.     Lita Ask, PT, DPT

## 2020-01-30 NOTE — FLOWSHEET NOTE
01/29/20 2035   Provider Notification   Reason for Communication Review case   Provider Name Debi De Leon Osvaldodouglas   Provider Notification Physician Assistant   Method of Communication Secure Message   Response See orders   Notification Time 2035 2035: RN Ricardo Aleman regarding her medications. Not all of her home medications were re-ordered. Can we get these restarted? 2104: Ngoc re-started her medications, see orders. Ngoc stated not Metformin. 2232: RN explained Patient Metformin was ordered wrong. Patient takes a total of 1800mg Gabapentin. Patient takes 2 300mg tablets 3 times daily, she didn't get one of her dinner tablets, so she needs 3 tables tonight (900 mg). 2250: Ngoc re-ordered her Gabapentin correctly for 2 300mg tablets 3 times daily, tonight patient will get 900mg total for the missed dose she did not receive in ER.     2302: Patient Zyrtec was not re-ordered. Patient requesting this medication. 2306: Zyrtec re-ordered for patient.

## 2020-01-30 NOTE — CONSULTS
Requesting Physician: Lore Zazueta MD     Reason for Consult:  Evaluate for Left facial droop    History of Present Illness:  Teodoro Matthew is a 62 y.o. female admitted to [address] [place] [address] on 1/29/2020. Patient yesterday was in usual state of health, when her son noticed that her left side of the face was droopy at around 3:20 PM.  There was no other associated focal neurological symptoms like slurred speech, double vision, headache, loss of vision, blurred vision, focal numbness tingling or weakness of the extremities or balance issues. The symptoms lasted for about 45 minutes to an hour and then went away. However patient for another 8 to 10 hours could feel the symptoms of as if there was Novocain shot in the left face and the left side of the lip felt puffy, for 8 to 10 hours but visually, there was no obvious facial droopiness at that time. She got concerned therefore decided to come to the ER. Her blood pressure in the ER was 123/71, pulse rate 81 and temperature 98.4. Patient had CT scan of the head, which was normal.  Chest x-ray was normal.  Her blood test shows normal CBC, hemoglobin A1c 5.8. Total cholesterol is 111, LDL 42, HDL 50 and triglycerides 95 all normal.  Patient has been on aspirin 81 mg daily and Plavix 75 mg related to her cardiac condition, as she has cardiac stents. At present she feels perfectly fine. Patient states that about 2 to 3 months ago she had a similar episode of left facial droop, that lasted for 8 to 10 minutes and went away. Patient did not seek medical attention for that. Patient has hypertension, hyperlipidemia. Never smoker. She has diabetes for 8 to 10 years. Patient states that she has history of Bell's palsy involving the right facial region when she was pregnant, >20 years ago, has no residual deficits.       Past Medical History:        Diagnosis Date    Anemia     Backache     Depression     Diabetes mellitus (HCC)     Eczema  Fibromyalgia     Fracture     Hyperlipidemia     Hypertension     Hypothyroidism     Metabolic syndrome     Migraine     Sinusitis     Sleep apnea            Procedure Laterality Date    ARM SURGERY      BONE GRAFT  2007    Dr. Sheryle Luster  2003    Dr. Ainsley Singh  2000    [facility] Rem[facility] Dr. Fay Ferrara       Allergies: Allergies   Allergen Reactions    Aspirin Other (See Comments)     Hearing?     Fluconazole     Lipitor [Atorvastatin Calcium] Other (See Comments)     Memory loss [id number]    Mepergan [Meperidine-Promethazine]     Motrin [Ibuprofen Micronized]     Viibryd [Vilazodone Hcl] Other (See Comments)    Adhesive Tape Rash     Some of them        Current Medications:   cetirizine (ZYRTEC) tablet 10 mg, Daily  lubrifresh P.M. (artificial tears) ophthalmic ointment, 4x Daily  buPROPion (WELLBUTRIN XL) extended release tablet 300 mg, QAM  calcium-vitamin D (OSCAL-500) 500-200 MG-UNIT per tablet 1 tablet, Daily  carbidopa-levodopa (SINEMET)  MG per tablet 1 tablet, Nightly  levothyroxine (SYNTHROID) tablet 112 mcg, Daily  Vitamin D (CHOLECALCIFEROL) tablet 1,000 Units, Daily  sodium chloride flush 0.9 % injection 10 mL, 2 times per day  sodium chloride flush 0.9 % injection 10 mL, PRN  magnesium hydroxide (MILK OF MAGNESIA) 400 MG/5ML suspension 30 mL, Daily PRN  ondansetron (ZOFRAN) injection 4 mg, Q6H PRN  enoxaparin (LOVENOX) injection 40 mg, Q24H  aspirin EC tablet 81 mg, Daily    Or  aspirin suppository 300 mg, Daily  clopidogrel (PLAVIX) tablet 75 mg, Daily  atenolol (TENORMIN) tablet 50 mg, Daily  canagliflozin (INVOKANA) tablet 300 mg, QAM AC  HYDROcodone-acetaminophen (NORCO) 5-325 MG per tablet 1 tablet, Q6H PRN  isosorbide mononitrate (IMDUR) extended release tablet 15 mg, Daily  tiZANidine (ZANAFLEX) tablet 4 mg, Q8H PRN  glucose (GLUTOSE) 40 % oral gel 15 g, PRN  dextrose 50 % IV solution, PRN  glucagon (rDNA) injection 1 mg, PRN  dextrose 5 % solution, PRN  insulin lispro (HUMALOG) injection vial 0-12 Units, TID WC  insulin lispro (HUMALOG) injection vial 0-6 Units, Nightly  losartan (COZAAR) tablet 50 mg, Daily  glycerin-hypromellose- 0.2-0.2-1 % opthalmic solution 1 drop, Q6H PRN  gabapentin (NEURONTIN) capsule 600 mg, TID  potassium chloride (KLOR-CON) extended release tablet 10 mEq, BID         Social History:  Social History     Tobacco Use   Smoking Status Never Smoker   Smokeless Tobacco Never Used     Social History     Substance and Sexual Activity   Alcohol Use Yes    Alcohol/week: 0.0 standard drinks    Comment: one glass every week or two     Social History     Substance and Sexual Activity   Drug Use No              Family History:       Problem Relation Age of Onset    Arthritis Mother     High Blood Pressure Mother     Heart Disease Father     Stroke Father     Diabetes Sister        Review of Systems:  Completely unremarkable at this time. Physical Exam:  /60   Pulse 78   Temp 98.5 °F (36.9 °C) (Oral)   Resp 16   Ht 5' 5\" (1.651 m)   Wt 200 lb 8 oz (90.9 kg)   LMP 11/04/2015   SpO2 96%   BMI 33.36 kg/m²  I Body mass index is 33.36 kg/m². I   Wt Readings from Last 1 Encounters:   01/30/20 200 lb 8 oz (90.9 kg)     On examination patient is a late middle aged [de-identified] female, in no distress. Patient is very pleasant, fully oriented. Speech and language functions are normal.  Attention concentration fund of knowledge is adequate. On cranial nerve examination pupils are round and reacting to light, visual fields are full on confrontation, extraocular muscles are intact with no nystagmus. Face is symmetric and tongue protrudes to the midline.   Palatal elevation and sensation normal.  On muscle strength testing there is no pronator drift and the strength is normal in arms and legs distally and proximally. Reflexes are 1 in the upper limbs, 2 at the knees, 1 ankles and plantars downgoing. Sensory touch is equal with no neglect. No loss of sensation on the facial region either. No ataxia for finger-to-nose testing. Tone and bulk of muscles and gait is normal.  No obvious bruit, S1-S2 audible, peripheral pulses present. Diagnostics:  CBC:   Lab Results   Component Value Date    WBC 4.5 01/30/2020    RBC 4.53 01/30/2020    HGB 13.9 01/30/2020    HCT 42.4 01/30/2020    MCV 93.6 01/30/2020    MCH 30.7 01/30/2020    MCHC 32.8 01/30/2020    RDW 13.8 06/14/2015     01/30/2020    MPV 9.1 01/30/2020     CMP:    Lab Results   Component Value Date     01/29/2020    K 3.7 01/29/2020     01/29/2020    CO2 21 01/29/2020    BUN 10 01/29/2020    CREATININE 0.5 01/29/2020    LABGLOM >90 01/29/2020    GLUCOSE 146 01/29/2020    PROT 7.7 06/14/2015    LABALBU 4.7 06/14/2015    LABALBU 4.2 02/06/2012    CALCIUM 9.6 01/29/2020    BILITOT 0.5 06/14/2015    ALKPHOS 124 06/14/2015    AST 25 06/14/2015    ALT 27 06/14/2015     PT/INR:    Lab Results   Component Value Date    INR 0.90 01/29/2020     U/A:        Impression:  · Recurrent left facial weakness (x2), now resolved. Rule out TIA. · Hypertension well controlled  · Diabetes, well controlled  · Hyperlipidemia, well controlled  · Coronary artery disease      Patient Active Problem List   Diagnosis    Hypothyroidism    Hyperlipemia    Metabolic syndrome    Menopause    Depression    Insulin resistance    Hypertension    Vitamin D deficiency    Restless leg syndrome    Daytime somnolence    Fatigue    Obesity (BMI 30-39. 9)    Obstructive sleep apnea on CPAP    Facial droop        Recommendations:  1. Patient will need TIA work-up. 2. Patient is undergoing MRI of the brain. 3. We will check carotid Doppler and 2D echo to rule out any embolic source.   4. Her vascular risk factors are very well controlled as mentioned above. 5. Patient will continue aspirin 81 mg and Plavix and her other medication regimen. It was my pleasure to evaluate Della Palomares today. Please call with questions.     1/30/2020 11:19 AM

## 2020-01-30 NOTE — CARE COORDINATION
1/30/20, 10:47 AM  DISCHARGE PLANNING EVALUATION:    Vaibhav Myers       Admitted from: ED 1/29/2020/ [facility] day: 0   Location: -19/019-A Reason for admit: Facial droop [R29.810] Status: OBS  Admit order signed?: yes  PMH:  has a past medical history of Anemia, Backache, Depression, Diabetes mellitus ([address] Eczema, Fibromyalgia, Fracture, Hyperlipidemia, Hypertension, Hypothyroidism, Metabolic syndrome, Migraine, Sinusitis, and Sleep apnea. Procedure: none  Pertinent abnormal Imaging:XR Chest    Impression:       No acute findings     CT Head WO Contrast   Impression:        No mass effect or acute hemorrhage. Medications:  Scheduled Meds:   cetirizine  10 mg Oral Daily    lubrifresh P.M. Ophthalmic 4x Daily    buPROPion  300 mg Oral QAM    calcium-vitamin D  1 tablet Oral Daily    carbidopa-levodopa  1 tablet Oral Nightly    levothyroxine  112 mcg Oral Daily    Vitamin D  1,000 Units Oral Daily    sodium chloride flush  10 mL Intravenous 2 times per day    enoxaparin  40 mg Subcutaneous Q24H    aspirin  81 mg Oral Daily    Or    aspirin  300 mg Rectal Daily    clopidogrel  75 mg Oral Daily    atenolol  50 mg Oral Daily    canagliflozin  300 mg Oral QAM AC    isosorbide mononitrate  15 mg Oral Daily    insulin lispro  0-12 Units Subcutaneous TID WC    insulin lispro  0-6 Units Subcutaneous Nightly    losartan  50 mg Oral Daily    gabapentin  600 mg Oral TID    potassium chloride  10 mEq Oral BID     Continuous Infusions:   dextrose        Pertinent Info/Orders/Treatment Plan:  Diabetes management, telemetry, Neurology consult, neuro checks, PT/OT/ST. Diet: DIET CARB CONTROL;   Smoking status:  reports that she has never smoked.  She has never used smokeless tobacco.   PCP: Sukhdev Gonzalez MD  Readmission 30 days or less: no   %    Discharge Planning Evaluation  Current Residence:  Private Residence  Living Arrangements:  Spouse/Significant Other, Children   Support Systems: Spouse/Significant Other, Children, Family Members  Current Services PTA:     Potential Assistance Needed:  N/A  Potential Assistance Purchasing Medications:  No  Does patient want to participate in local refill/ meds to beds program?  No  Type of Home Care Services:  None  Patient expects to be discharged to:  private residence  Expected Discharge date:  01/31/20  Follow Up Appointment: Best Day/ Time: Monday AM    Patient Goals/Plan/Treatment Preferences: Met with Vicki. She currently lives at home with her spouse. Plan at discharge is to return home . She denies need for DME, and declines HH. Transportation/Food Security/Housekeeping Addressed:  No issues identified.     Evaluation: no

## 2020-01-30 NOTE — PLAN OF CARE
Problem: Falls - Risk of:  Goal: Will remain free from falls  Description  Will remain free from falls  Outcome: Met This Shift     Problem: Falls - Risk of:  Goal: Absence of physical injury  Description  Absence of physical injury  Outcome: Met This Shift  Note:   No falls or physical injury noted this shift, fall precautions in place. Problem: Discharge Planning:  Goal: Discharged to appropriate level of care  Description  Discharged to appropriate level of care  Outcome: Ongoing  Note:   Plan to d/c home, awaiting imaging results. Pt verbalizes she does not want to stay the night here. Dr. David Damon aware     Problem: Skin Integrity:  Goal: Will show no infection signs and symptoms  Description  Will show no infection signs and symptoms  Outcome: Met This Shift  Note:   Pt afebrile, VSS, skin intact     Problem: Pain:  Goal: Pain level will decrease  Description  Pain level will decrease  Outcome: Ongoing  Note:   Pt c/o headache earlier in the shift, was relieved by fioricet     Problem: Pain:  Goal: Pain level will decrease  Description  Pain level will decrease  Outcome: Ongoing  Note:   Pt c/o headache earlier in the shift, was relieved by fioricet     Problem: HEMODYNAMIC STATUS  Goal: Patient has stable vital signs and fluid balance  Outcome: Ongoing  Note:   VSS     Problem: ACTIVITY INTOLERANCE/IMPAIRED MOBILITY  Goal: Mobility/activity is maintained at optimum level for patient  Outcome: Met This Shift  Note:   Pt independently performs ADLs and ambulation. Problem: COMMUNICATION IMPAIRMENT  Goal: Ability to express needs and understand communication  Outcome: Met This Shift  Note:   Pt has no verbal impairment noted.      Problem: Serum Glucose Level - Abnormal:  Goal: Ability to maintain appropriate glucose levels has stabilized  Description  Ability to maintain appropriate glucose levels has stabilized  Outcome: Ongoing  Note:   BG WNL this shift, will continue to monitor   Care plan
Ongoing  Note:   Patient able to use 0-10 pain scale. Denies pain at this time. Patient has not complained of any pain this shift. Patient pain goal \"0/10\". Problem: HEMODYNAMIC STATUS  Goal: Patient has stable vital signs and fluid balance  Outcome: Ongoing  Note:   Vitals:    01/29/20 2342   BP: 128/68   Pulse: 84   Resp: 16   Temp: 98.4 °F (36.9 °C)   SpO2: 96%     Patient VSS. Problem: ACTIVITY INTOLERANCE/IMPAIRED MOBILITY  Goal: Mobility/activity is maintained at optimum level for patient  Outcome: Ongoing  Note:   Patient is ambulating with standby assist. No complaints of dizziness or lightheadedness this shift. PT/OT ordered this shift. Problem: COMMUNICATION IMPAIRMENT  Goal: Ability to express needs and understand communication  Outcome: Ongoing  Note:   Patient alert and oriented x4. Speech clear. No signs of aphasia/dysarthria at this time. Responding to commands appropriately. NIH score of 1 this shift. Neuro checks q4hrs. Will continue to monitor and assess. Ongoing until discharge. Problem: Serum Glucose Level - Abnormal:  Goal: Ability to maintain appropriate glucose levels has stabilized  Description  Ability to maintain appropriate glucose levels has stabilized  Outcome: Ongoing  Note:   Blood sugar within normal limits. Nightly dose of insulin held this shift. Care plan reviewed with patient and RN. Patient and RN verbalize understanding of the plan of care and contribute to goal setting.

## 2020-02-01 NOTE — DISCHARGE SUMMARY
auscultation. No retractions  Cardiac: RRR without murmur. Abdomen: soft. Nontender. Bowel sounds positive. Extremities:  No clubbing, cyanosis, or edema x 4. Vasculature: capillary refill < 3 seconds. Palpable LE pulses bilaterally. Skin:  warm and dry. Psych:  Alert and oriented x3. Affect appropriate  Lymph:  No supraclavicular adenopathy. Neurologic:  No focal deficit. No seizures. Cranial nerves II through XII intact. 5 out of 5 both upper and lower extremity strength      Labs: For convenience and continuity at follow-up the following most recent labs are provided:      CBC:    Lab Results   Component Value Date    WBC 4.5 01/30/2020    HGB 13.9 01/30/2020    HCT 42.4 01/30/2020     01/30/2020       Renal:    Lab Results   Component Value Date     01/29/2020    K 3.7 01/29/2020     01/29/2020    CO2 21 01/29/2020    BUN 10 01/29/2020    CREATININE 0.5 01/29/2020    CALCIUM 9.6 01/29/2020         Significant Diagnostic Studies    Radiology:   RADIOLOGY REPORT   Final Result      MRI BRAIN WO CONTRAST   Final Result       1. No acute findings. 2. Stable minimal severity chronic small vessel ischemic changes. **This report has been created using voice recognition software. It may contain minor errors which are inherent in voice recognition technology. **      Final report electronically signed by Dr. Marta Daniels on 1/30/2020 2:24 PM      XR CHEST PORTABLE   Final Result   No acute findings               **This report has been created using voice recognition software. It may contain minor errors which are inherent in voice recognition technology. **      Final report electronically signed by Dr. Martha Rivera on 1/29/2020 5:37 PM      CT HEAD WO CONTRAST (CODE STROKE)   Final Result   No mass effect or acute hemorrhage. **This report has been created using voice recognition software.  It may contain minor errors which are inherent in voice recognition technology. **      Final report electronically signed by Dr. Ronald Macias on 1/29/2020 4:09 PM             Consults:     IP CONSULT TO NEUROLOGY    Disposition: Home  Condition at Discharge: unable to be determined, see above. Otherwise not in any distress when leaving the hospital    Code Status:  Prior     Patient Instructions:    Unable to provide further instructions based on incomplete work-up    Follow-up visits:   MD Carlton Garland   [address]  [phone]               Discharge Medications:   No discharge medicine reconciliation was completed due to leaving before work-up could be completed     Cherie Garcia   Home Medication Instructions HMI:[id number]    Printed on:02/01/20 2301   Medication Information                      artificial tears (ARTIFICIAL TEARS) OINT  4 times daily             aspirin 81 MG tablet  Take 81 mg by mouth daily             atenolol (TENORMIN) 50 MG tablet  Take 50 mg by mouth daily. B Complex Vitamins (VITAMIN B COMPLEX PO)  Take  by mouth daily. buPROPion (WELLBUTRIN XL) 150 MG XL tablet  Take 300 mg by mouth every morning. butalbital-acetaminophen-caffeine (FIORICET, ESGIC) -40 MG per tablet  Take 1 tablet by mouth every 4 hours as needed for Headaches             Calcium Carbonate-Vitamin D (CALCIUM 600 + D) 600-400 MG-UNIT TABS  Take 1 tablet by mouth daily. canagliflozin (INVOKANA) 300 MG TABS tablet  Take 300 mg by mouth every morning (before breakfast)             carbidopa-levodopa (SINEMET)  MG per tablet  Take 1 tablet by mouth nightly             Chlorpheniramine-Phenylephrine 4-10 MG TABS  Take by mouth             clopidogrel (PLAVIX) 75 MG tablet  Take 75 mg by mouth daily             CPAP Machine MISC  by Does not apply route Please increase her CPAP pressure to 13 cm H20.  She is complaining of insufficient pressure on current settings of CPAP pressure of 11cm H20. Please check her CPAP for proper functioning. cycloSPORINE (RESTASIS) 0.05 % ophthalmic emulsion  1 drop 2 times daily             Dulaglutide (TRULICITY) 1.5 RI/5.1GH SOPN  Inject 1.5 mg into the skin once a week Next dose due 1/30             Flaxseed, Linseed, (FLAXSEED OIL) 1000 MG CAPS  Take  by mouth daily. gabapentin (NEURONTIN) 300 MG capsule  Take 300 mg by mouth 4 times daily. 1 tablet in the morning, 2 at lunch, 1 at dinner, 1 at nighttime             HYDROcodone-acetaminophen (NORCO) 5-325 MG per tablet  Take 1 tablet by mouth every 6 hours as needed for Pain             isosorbide mononitrate (IMDUR) 30 MG extended release tablet  Take 15 mg by mouth daily Take in the morning (1/2 tablet)             levothyroxine (SYNTHROID) 112 MCG tablet  Take 1 tablet by mouth daily for 90 days. metformin (GLUCOPHAGE XR) 500 MG XR tablet  Take 1 tablet by mouth daily (with breakfast) for 30 days. Multiple Vitamin (MULTI-VITAMIN PO)  Take  by mouth daily. Multiple Vitamins-Minerals (OCUVITE ADULT 50+) CAPS  Take by mouth daily             pitavastatin (LIVALO) 2 MG TABS tablet  Take by mouth nightly             potassium chloride (KLOR-CON 10) 10 MEQ extended release tablet  Take 10 mEq by mouth daily At nighttime             PRALUENT 75 MG/ML SOPN injection pen  INJECT 1 SUBCUTANEOUSLY EVERY OTHER WEEK             Probiotic Product (PROBIOTIC DAILY PO)  Take by mouth             Respiratory Therapy Supplies (ADULT MASK) MISC  Please do mask refit and provide mask of patient's choice. She is interested in Full face mask to minimize leaks. I recommend Ruchi view mask.              SYNTHROID 112 MCG tablet               tiZANidine (ZANAFLEX) 4 MG tablet  Take 4 mg by mouth every 8 hours as needed             valsartan (DIOVAN) 80 MG tablet  Take 80 mg by mouth daily             Vitamin D (CHOLECALCIFEROL) 1000 UNIT CAPS capsule  Take

## 2020-07-20 ENCOUNTER — HOSPITAL ENCOUNTER (EMERGENCY)
Age: 60
Discharge: HOME | End: 2020-07-20
Payer: COMMERCIAL

## 2020-07-20 VITALS
BODY MASS INDEX: 32.49 KG/M2 | SYSTOLIC BLOOD PRESSURE: 143 MMHG | HEIGHT: 65 IN | TEMPERATURE: 97 F | HEART RATE: 73 BPM | OXYGEN SATURATION: 97 % | WEIGHT: 195 LBS | DIASTOLIC BLOOD PRESSURE: 68 MMHG | RESPIRATION RATE: 18 BRPM

## 2020-07-20 PROCEDURE — 99212 OFFICE O/P EST SF 10 MIN: CPT

## 2020-07-20 RX ORDER — AMOXICILLIN AND CLAVULANATE POTASSIUM 875; 125 MG/1; MG/1
1 TABLET, FILM COATED ORAL 2 TIMES DAILY
Qty: 14 TABLET | Refills: 0 | Status: SHIPPED | OUTPATIENT
Start: 2020-07-20 | End: 2020-07-27

## 2020-07-20 ASSESSMENT — ENCOUNTER SYMPTOMS
VOMITING: 0
COUGH: 0
NAUSEA: 0
SINUS PRESSURE: 1
SORE THROAT: 0
SHORTNESS OF BREATH: 0

## 2020-07-20 ASSESSMENT — PAIN DESCRIPTION - FREQUENCY: FREQUENCY: CONTINUOUS

## 2020-07-20 ASSESSMENT — PAIN DESCRIPTION - LOCATION: LOCATION: HEAD

## 2020-07-20 ASSESSMENT — PAIN DESCRIPTION - DESCRIPTORS: DESCRIPTORS: CONSTANT

## 2020-07-20 ASSESSMENT — PAIN DESCRIPTION - PAIN TYPE: TYPE: ACUTE PAIN

## 2020-07-20 ASSESSMENT — PAIN SCALES - GENERAL: PAINLEVEL_OUTOF10: 3

## 2020-07-20 ASSESSMENT — PAIN DESCRIPTION - ORIENTATION: ORIENTATION: RIGHT;LEFT;ANTERIOR;POSTERIOR

## 2020-07-20 NOTE — ED PROVIDER NOTES
[facility]  Urgent Care Encounter       CHIEF COMPLAINT       Chief Complaint   Patient presents with    Sinusitis     x2 months    Headache     x2 months       Nurses Notes reviewed and I agree except as noted in the HPI. HISTORY OF PRESENT ILLNESS   Megan Blanco is a 56 y.o. female who presents for evaluation of facial pain as well as pain/pressure to the forehead area. Patient states that the symptoms have been ongoing for the past 2 months without any relief. She states that she has a history of seasonal allergies and has been using Xyzal as well as Mucinex and decongestants at home without much relief. She denies any fever, cough, chills or known sick exposures. The history is provided by the patient. REVIEW OF SYSTEMS     Review of Systems   Constitutional: Negative for chills and fever. HENT: Positive for congestion and sinus pressure. Negative for sore throat. Respiratory: Negative for cough and shortness of breath. Cardiovascular: Negative for chest pain. Gastrointestinal: Negative for nausea and vomiting. Musculoskeletal: Negative for arthralgias and myalgias. Skin: Negative for rash. Allergic/Immunologic: Positive for environmental allergies. Neurological: Positive for headaches. PAST MEDICAL HISTORY         Diagnosis Date    Anemia     Backache     Depression     Diabetes mellitus ([facility] [address]     Eczema     Fibromyalgia     Fracture     Hyperlipidemia     Hypertension     Hypothyroidism     Metabolic syndrome     Migraine     Sinusitis     Sleep apnea        SURGICALHISTORY     Patient  has a past surgical history that includes Tonsillectomy (1962); Cholecystectomy (2003); turbinate resection (1991); other surgical history (2000); bone graft (2007); Dilation and curettage of uterus; Carpal tunnel release; LEEP; and Arm Surgery.     CURRENT MEDICATIONS       Previous Medications    ARTIFICIAL TEARS (ARTIFICIAL TEARS) OINT    4 times daily    ASPIRIN 81 MG TABLET    Take 81 mg by mouth daily    ATENOLOL (TENORMIN) 50 MG TABLET    Take 50 mg by mouth daily. B COMPLEX VITAMINS (VITAMIN B COMPLEX PO)    Take  by mouth daily. BUPROPION (WELLBUTRIN XL) 150 MG XL TABLET    Take 300 mg by mouth every morning. BUTALBITAL-ACETAMINOPHEN-CAFFEINE (FIORICET, ESGIC) -40 MG PER TABLET    Take 1 tablet by mouth every 4 hours as needed for Headaches    CALCIUM CARBONATE-VITAMIN D (CALCIUM 600 + D) 600-400 MG-UNIT TABS    Take 1 tablet by mouth daily. CANAGLIFLOZIN (INVOKANA) 300 MG TABS TABLET    Take 300 mg by mouth every morning (before breakfast)    CARBIDOPA-LEVODOPA (SINEMET)  MG PER TABLET    Take 1 tablet by mouth nightly    CHLORPHENIRAMINE-PHENYLEPHRINE 4-10 MG TABS    Take by mouth    CLOPIDOGREL (PLAVIX) 75 MG TABLET    Take 75 mg by mouth daily    CPAP MACHINE MISC    by Does not apply route Please increase her CPAP pressure to 13 cm H20. She is complaining of insufficient pressure on current settings of CPAP pressure of 11cm H20. Please check her CPAP for proper functioning. CYCLOSPORINE (RESTASIS) 0.05 % OPHTHALMIC EMULSION    1 drop 2 times daily    DULAGLUTIDE (TRULICITY) 1.5 ESPINAL/2.0WI SOPN    Inject 1.5 mg into the skin once a week Next dose due 1/30    FLAXSEED, LINSEED, (FLAXSEED OIL) 1000 MG CAPS    Take  by mouth daily. GABAPENTIN (NEURONTIN) 300 MG CAPSULE    Take 300 mg by mouth 4 times daily. 1 tablet in the morning, 2 at lunch, 1 at dinner, 1 at nighttime    ISOSORBIDE MONONITRATE (IMDUR) 30 MG EXTENDED RELEASE TABLET    Take 15 mg by mouth daily Take in the morning (1/2 tablet)    LEVOTHYROXINE (SYNTHROID) 112 MCG TABLET    Take 1 tablet by mouth daily for 90 days. METFORMIN (GLUCOPHAGE XR) 500 MG XR TABLET    Take 1 tablet by mouth daily (with breakfast) for 30 days. MULTIPLE VITAMIN (MULTI-VITAMIN PO)    Take  by mouth daily.       MULTIPLE VITAMINS-MINERALS ([address] 50+) CAPS Take by mouth daily    PITAVASTATIN (LIVALO) 2 MG TABS TABLET    Take by mouth nightly    POTASSIUM CHLORIDE (KLOR-CON 10) 10 MEQ EXTENDED RELEASE TABLET    Take 10 mEq by mouth daily At nighttime    PRALUENT 75 MG/ML SOPN INJECTION PEN    INJECT 1 SUBCUTANEOUSLY EVERY OTHER WEEK    PROBIOTIC PRODUCT (PROBIOTIC DAILY PO)    Take by mouth    RESPIRATORY THERAPY SUPPLIES (ADULT MASK) MISC    Please do mask refit and provide mask of patient's choice. She is interested in Full face mask to minimize leaks. I recommend Ruchi view mask. SYNTHROID 112 MCG TABLET        TIZANIDINE (ZANAFLEX) 4 MG TABLET    Take 4 mg by mouth every 8 hours as needed    VALSARTAN (DIOVAN) 80 MG TABLET    Take 80 mg by mouth daily    VITAMIN D (CHOLECALCIFEROL) 1000 UNIT CAPS CAPSULE    Take 1,000 Units by mouth daily. ALLERGIES     Patient is is allergic to aspirin; fluconazole; lipitor [atorvastatin calcium]; mepergan [meperidine-promethazine]; motrin [ibuprofen micronized]; viibryd [vilazodone hcl]; and adhesive tape. Patients   Immunization History   Administered Date(s) Administered    Influenza Vaccine, unspecified formulation 10/01/2016       FAMILY HISTORY     Patient's family history includes Arthritis in her mother; Diabetes in her sister; Heart Disease in her father; High Blood Pressure in her mother; Stroke in her father. SOCIAL HISTORY     Patient  reports that she has never smoked. She has never used smokeless tobacco. She reports current alcohol use. She reports that she does not use drugs. PHYSICAL EXAM     ED TRIAGE VITALS  BP: (!) 143/68, Temp: 97 °F (36.1 °C), Pulse: 73, Resp: 18, SpO2: 97 %,Estimated body mass index is 32.45 kg/m² as calculated from the following:    Height as of this encounter: 5' 5\" (1.651 m). Weight as of this encounter: 195 lb (88.5 kg). ,Patient's last menstrual period was 11/04/2015. Physical Exam  Vitals signs and nursing note reviewed.    Constitutional:       General: She is not in acute distress. Appearance: She is well-developed. She is not diaphoretic. HENT:      Right Ear: Tympanic membrane is bulging. Tympanic membrane is not erythematous. Left Ear: Tympanic membrane is bulging. Tympanic membrane is not erythematous. Nose:      Right Sinus: Maxillary sinus tenderness present. No frontal sinus tenderness. Left Sinus: Maxillary sinus tenderness present. No frontal sinus tenderness. Mouth/Throat:      Pharynx: Oropharynx is clear. Tonsils: No tonsillar exudate. 0 on the right. 0 on the left. Eyes:      Conjunctiva/sclera:      Right eye: Right conjunctiva is not injected. Left eye: Left conjunctiva is not injected. Pupils: Pupils are equal.   Neck:      Musculoskeletal: Normal range of motion. Cardiovascular:      Rate and Rhythm: Normal rate and regular rhythm. Heart sounds: No murmur. Pulmonary:      Effort: Pulmonary effort is normal. No respiratory distress. Breath sounds: Normal breath sounds. Musculoskeletal:      Right knee: She exhibits normal range of motion. Left knee: She exhibits normal range of motion. Skin:     General: Skin is warm. Findings: No rash. Neurological:      Mental Status: She is alert and oriented to person, place, and time. Psychiatric:         Behavior: Behavior normal.         DIAGNOSTIC RESULTS     Labs:No results found for this visit on 07/20/20. IMAGING:    No orders to display         EKG:  none    URGENT CARE COURSE:     Vitals:    07/20/20 1407   BP: (!) 143/68   Pulse: 73   Resp: 18   Temp: 97 °F (36.1 °C)   TempSrc: Tympanic   SpO2: 97%   Weight: 195 lb (88.5 kg)   Height: 5' 5\" (1.651 m)       Medications - No data to display         PROCEDURES:  None    FINAL IMPRESSION      1.  Acute maxillary sinusitis, recurrence not specified          DISPOSITION/ PLAN     Exam is consistent with maxillary sinusitis at this time I discussed with the patient the plan to treat with oral antibiotics and to continue the medications that she has been taking at home. Based on the length of the patient's symptoms and lack of fever, I do not believe a cover test is warranted today. Patient is agreeable to plan as discussed.       PATIENT REFERRED TO:  Kulwinder Collazo MD  [facility] / [address] [address] [facility] [id number]      DISCHARGE MEDICATIONS:  New Prescriptions    AMOXICILLIN-CLAVULANATE (AUGMENTIN) 875-125 MG PER TABLET    Take 1 tablet by mouth 2 times daily for 7 days       Discontinued Medications    HYDROCODONE-ACETAMINOPHEN (NORCO) 5-325 MG PER TABLET    Take 1 tablet by mouth every 6 hours as needed for Pain       Current Discharge Medication List          Sue Martinez, APRN - CNP    (Please note that portions of this note were completed with a voice recognition program. Efforts were made to edit the dictations but occasionally words are mis-transcribed.)          Sue Martinez, APRN - CNP  07/20/20 1281

## 2020-07-20 NOTE — ED NOTES
Patient discharge instructions given to pt and pt verbalized understanding, 1 px given,  no other needs at this time, and pt left in stable condition.      Bette Ritter RN  07/20/20 9534

## 2020-07-20 NOTE — ED NOTES
Patient presents to [facility] with complaints of headache x2 months with sinus pressure under her eyes.       Carlie Jaquez RN  07/20/20 4125

## 2020-07-28 ENCOUNTER — HOSPITAL ENCOUNTER (EMERGENCY)
Age: 60
Discharge: HOME | End: 2020-07-28
Attending: EMERGENCY MEDICINE
Payer: COMMERCIAL

## 2020-07-28 VITALS
HEIGHT: 65 IN | TEMPERATURE: 97.6 F | DIASTOLIC BLOOD PRESSURE: 56 MMHG | OXYGEN SATURATION: 97 % | BODY MASS INDEX: 32.49 KG/M2 | WEIGHT: 195 LBS | RESPIRATION RATE: 16 BRPM | HEART RATE: 75 BPM | SYSTOLIC BLOOD PRESSURE: 108 MMHG

## 2020-07-28 PROCEDURE — 99213 OFFICE O/P EST LOW 20 MIN: CPT | Performed by: EMERGENCY MEDICINE

## 2020-07-28 PROCEDURE — 99212 OFFICE O/P EST SF 10 MIN: CPT

## 2020-07-28 RX ORDER — CLINDAMYCIN HYDROCHLORIDE 150 MG/1
150 CAPSULE ORAL 3 TIMES DAILY
Qty: 21 CAPSULE | Refills: 0 | Status: SHIPPED | OUTPATIENT
Start: 2020-07-28 | End: 2020-08-04

## 2020-07-28 ASSESSMENT — ENCOUNTER SYMPTOMS
TROUBLE SWALLOWING: 0
SHORTNESS OF BREATH: 0
CHOKING: 0
EYE PAIN: 0
VOMITING: 0
FACIAL SWELLING: 0
EYE DISCHARGE: 0
WHEEZING: 0
CONSTIPATION: 0
COUGH: 0
VOICE CHANGE: 0
SORE THROAT: 1
SINUS PAIN: 1
EYE REDNESS: 0
SINUS PRESSURE: 1
STRIDOR: 0
NAUSEA: 0
RHINORRHEA: 1
ABDOMINAL PAIN: 0
BLOOD IN STOOL: 0
BACK PAIN: 0
DIARRHEA: 0

## 2020-07-28 ASSESSMENT — PAIN DESCRIPTION - LOCATION: LOCATION: FACE

## 2020-07-28 ASSESSMENT — PAIN DESCRIPTION - PAIN TYPE: TYPE: ACUTE PAIN

## 2020-07-28 ASSESSMENT — PAIN DESCRIPTION - FREQUENCY: FREQUENCY: CONTINUOUS

## 2020-07-28 ASSESSMENT — PAIN SCALES - GENERAL: PAINLEVEL_OUTOF10: 4

## 2020-07-28 ASSESSMENT — PAIN DESCRIPTION - ORIENTATION: ORIENTATION: ANTERIOR

## 2020-07-28 ASSESSMENT — PAIN DESCRIPTION - DESCRIPTORS: DESCRIPTORS: ACHING

## 2020-07-28 NOTE — ED PROVIDER NOTES
[id number]       Chief Complaint   Patient presents with    Facial Swelling     pressure in face and having headaches still. was seen on the 20th for sinusitis. still a little dizzy but that is better. also treated with an antifungal since seen last/       Nurses Notes reviewed and I agree except as noted in the HPI. HISTORY OF PRESENT ILLNESS   Osmel Eckert is a 56 y.o. female who presents with 2-week history of maxillary sinus pain and pressure, postnasal drainage disease, fatigue, malaise. Patient has taken Augmentin and still has persistent symptoms. She rates sinus pain at 4 out of 10 in severity. Antifungal medication added as well. Taking Xyzal, Flonase, and decongestant. No chest pain, shortness of breath, fever, vomiting, stridor, rash,  symptoms, diarrhea. No weight gain, leg swelling or leg pain. History of diabetes with blood sugars in good range. No history of CHF, PE, COPD. Non-smoker. Status post tonsillectomy    REVIEW OF SYSTEMS     Review of Systems   Constitutional: Positive for appetite change, chills and fatigue. Negative for fever and unexpected weight change. HENT: Positive for postnasal drip, rhinorrhea, sinus pressure, sinus pain and sore throat. Negative for congestion, ear discharge, ear pain, facial swelling, hearing loss, nosebleeds, trouble swallowing and voice change. Eyes: Negative for pain, discharge, redness and visual disturbance. Respiratory: Negative for cough, choking, shortness of breath, wheezing and stridor. Cardiovascular: Negative for chest pain and leg swelling. Gastrointestinal: Negative for abdominal pain, blood in stool, constipation, diarrhea, nausea and vomiting. Genitourinary: Negative for dysuria, flank pain, frequency, hematuria, urgency, vaginal bleeding and vaginal discharge. Musculoskeletal: Negative for arthralgias, back pain, neck pain and neck stiffness. Skin: Negative for rash. Neurological: Positive for dizziness and headaches. Negative for seizures, syncope, weakness and light-headedness. Hematological: Negative for adenopathy. Does not bruise/bleed easily. Psychiatric/Behavioral: Negative for confusion, sleep disturbance and suicidal ideas. The patient is not nervous/anxious. All other systems reviewed and are negative. PAST MEDICAL HISTORY         Diagnosis Date    Anemia     Backache     Depression     Diabetes mellitus ([address]     Eczema     Fibromyalgia     Fracture     Hyperlipidemia     Hypertension     Hypothyroidism     Metabolic syndrome     Migraine     Sinusitis     Sleep apnea        SURGICAL HISTORY     Patient  has a past surgical history that includes Tonsillectomy (1962); Cholecystectomy (2003); turbinate resection (1991); other surgical history (2000); bone graft (2007); Dilation and curettage of uterus; Carpal tunnel release; LEEP; and Arm Surgery. CURRENT MEDICATIONS       Previous Medications    ARTIFICIAL TEARS (ARTIFICIAL TEARS) OINT    4 times daily    ASPIRIN 81 MG TABLET    Take 81 mg by mouth daily    ATENOLOL (TENORMIN) 50 MG TABLET    Take 50 mg by mouth daily. B COMPLEX VITAMINS (VITAMIN B COMPLEX PO)    Take  by mouth daily. BUPROPION (WELLBUTRIN XL) 150 MG XL TABLET    Take 300 mg by mouth every morning. BUTALBITAL-ACETAMINOPHEN-CAFFEINE (FIORICET, ESGIC) -40 MG PER TABLET    Take 1 tablet by mouth every 4 hours as needed for Headaches    CALCIUM CARBONATE-VITAMIN D (CALCIUM 600 + D) 600-400 MG-UNIT TABS    Take 1 tablet by mouth daily.       CANAGLIFLOZIN (INVOKANA) 300 MG TABS TABLET    Take 300 mg by mouth every morning (before breakfast)    CARBIDOPA-LEVODOPA (SINEMET)  MG PER TABLET    Take 1 tablet by mouth nightly    CHLORPHENIRAMINE-PHENYLEPHRINE 4-10 MG TABS    Take by mouth    CLOPIDOGREL (PLAVIX) 75 MG TABLET    Take 75 mg by mouth daily    CPAP MACHINE MISC    by Does not apply route Please increase her CPAP pressure to 13 cm H20. She is complaining of insufficient pressure on current settings of CPAP pressure of 11cm H20. Please check her CPAP for proper functioning. CYCLOSPORINE (RESTASIS) 0.05 % OPHTHALMIC EMULSION    1 drop 2 times daily    DULAGLUTIDE (TRULICITY) 1.5 AG/9.6ZJ SOPN    Inject 1.5 mg into the skin once a week Next dose due 1/30    FLAXSEED, LINSEED, (FLAXSEED OIL) 1000 MG CAPS    Take  by mouth daily. GABAPENTIN (NEURONTIN) 300 MG CAPSULE    Take 300 mg by mouth 4 times daily. 1 tablet in the morning, 2 at lunch, 1 at dinner, 1 at nighttime    ISOSORBIDE MONONITRATE (IMDUR) 30 MG EXTENDED RELEASE TABLET    Take 15 mg by mouth daily Take in the morning (1/2 tablet)    LEVOTHYROXINE (SYNTHROID) 112 MCG TABLET    Take 1 tablet by mouth daily for 90 days. METFORMIN (GLUCOPHAGE XR) 500 MG XR TABLET    Take 1 tablet by mouth daily (with breakfast) for 30 days. MULTIPLE VITAMIN (MULTI-VITAMIN PO)    Take  by mouth daily. MULTIPLE VITAMINS-MINERALS ([place] ADULT 50+) CAPS    Take by mouth daily    PITAVASTATIN (LIVALO) 2 MG TABS TABLET    Take by mouth nightly    POTASSIUM CHLORIDE (KLOR-CON 10) 10 MEQ EXTENDED RELEASE TABLET    Take 10 mEq by mouth daily At nighttime    PRALUENT 75 MG/ML SOPN INJECTION PEN    INJECT 1 SUBCUTANEOUSLY EVERY OTHER WEEK    PROBIOTIC PRODUCT (PROBIOTIC DAILY PO)    Take by mouth    RESPIRATORY THERAPY SUPPLIES (ADULT MASK) MISC    Please do mask refit and provide mask of patient's choice. She is interested in Full face mask to minimize leaks. I recommend Ruchi view mask. SYNTHROID 112 MCG TABLET        TIZANIDINE (ZANAFLEX) 4 MG TABLET    Take 4 mg by mouth every 8 hours as needed    VALSARTAN (DIOVAN) 80 MG TABLET    Take 80 mg by mouth daily    VITAMIN D (CHOLECALCIFEROL) 1000 UNIT CAPS CAPSULE    Take 1,000 Units by mouth daily.          ALLERGIES     Patient is is allergic to aspirin; fluconazole; lipitor [atorvastatin calcium]; mepergan [meperidine-promethazine]; motrin [ibuprofen micronized]; viibryd [vilazodone hcl]; and adhesive tape. FAMILY HISTORY     Patient'sfamily history includes Arthritis in her mother; Diabetes in her sister; Heart Disease in her father; High Blood Pressure in her mother; Stroke in her father. SOCIAL HISTORY     Patient  reports that she has never smoked. She has never used smokeless tobacco. She reports current alcohol use. She reports that she does not use drugs. PHYSICAL EXAM     ED TRIAGE VITALS  BP: (!) 108/56, Temp: 97.6 °F (36.4 °C), Pulse: 75, Resp: 16, SpO2: 97 %  Physical Exam  Vitals signs and nursing note reviewed. Constitutional:       General: She is not in acute distress. Appearance: She is well-developed. She is not ill-appearing. Comments: Nasal voice, moist membranes, normal airway   HENT:      Head: Normocephalic and atraumatic. Salivary Glands: Right salivary gland is not diffusely enlarged. Left salivary gland is not diffusely enlarged. Right Ear: Tympanic membrane and external ear normal.      Left Ear: Tympanic membrane and external ear normal.      Nose: Congestion and rhinorrhea present. Right Sinus: Maxillary sinus tenderness and frontal sinus tenderness present. Left Sinus: Maxillary sinus tenderness and frontal sinus tenderness present. Mouth/Throat:      Pharynx: No oropharyngeal exudate or posterior oropharyngeal erythema. Tonsils: No tonsillar exudate. 0 on the right. 0 on the left. Comments: Pharynx normal  Eyes:      General: No scleral icterus. Right eye: No discharge. Left eye: No discharge. Extraocular Movements:      Right eye: Normal extraocular motion. Left eye: Normal extraocular motion. Conjunctiva/sclera: Conjunctivae normal.      Pupils: Pupils are equal, round, and reactive to light.       Comments: Conjunctiva clear   Neck:      Musculoskeletal: Normal range of motion. Thyroid: No thyromegaly. Vascular: No JVD. Comments: No meningismus  Cardiovascular:      Rate and Rhythm: Normal rate and regular rhythm. Pulses: Normal pulses. Heart sounds: Normal heart sounds, S1 normal and S2 normal. No murmur. No friction rub. No gallop. Pulmonary:      Effort: Pulmonary effort is normal. No tachypnea or respiratory distress. Breath sounds: Normal breath sounds. No stridor. No decreased breath sounds, wheezing, rhonchi or rales. Comments: No cough, lungs clear throughout  Chest:      Chest wall: No tenderness. Abdominal:      General: Bowel sounds are normal. There is no distension. Palpations: Abdomen is soft. There is no mass. Tenderness: There is no abdominal tenderness. There is no right CVA tenderness, left CVA tenderness, guarding or rebound. Comments: Soft nontender   Musculoskeletal: Normal range of motion. General: No tenderness. Right lower leg: Normal.      Left lower leg: Normal.   Lymphadenopathy:      Cervical: Cervical adenopathy present. Right cervical: Superficial cervical adenopathy present. No deep or posterior cervical adenopathy. Left cervical: Superficial cervical adenopathy present. No deep or posterior cervical adenopathy. Skin:     General: Skin is warm and dry. Findings: No erythema or rash. Comments: No rash or bruising   Neurological:      Mental Status: She is alert and oriented to person, place, and time. Cranial Nerves: No cranial nerve deficit. Motor: No abnormal muscle tone. Coordination: Coordination normal.      Deep Tendon Reflexes: Reflexes are normal and symmetric. Reflexes normal.      Comments: Appropriate, no focal findings   Psychiatric:         Behavior: Behavior normal.         Thought Content:  Thought content normal.         Judgment: Judgment normal.         DIAGNOSTIC RESULTS   Labs: No results found for this visit on 07/28/20. IMAGING:  No orders to display     URGENT CARE COURSE:     Vitals:    07/28/20 1407   BP: (!) 108/56   Pulse: 75   Resp: 16   Temp: 97.6 °F (36.4 °C)   TempSrc: Temporal   SpO2: 97%   Weight: 195 lb (88.5 kg)   Height: 5' 5\" (1.651 m)       Medications - No data to display  PROCEDURES:  None  FINALIMPRESSION      1. Acute recurrent maxillary sinusitis        DISPOSITION/PLAN   DISPOSITION Decision To Discharge 07/28/2020 02:20:52 PM  Nontoxic, well-hydrated, normal airway. No airway abscess or epiglottitis, sepsis, CNS infection, pneumonia, hypoxia, bronchospasm. No ACS, CHF, PE. Patient has persistent sinusitis. Will treat with Cleocin, Tylenol, Xyzal, Flonase, decongestant, increased oral clear liquids, vaporizer, rest in cool air conditioned space. Patient is to recheck with PCP in 5 days if problems persist, and understands to go to ED if worse.                                                                            PATIENT REFERRED TO:  MD Carlton Henry   [address] [facility]  [address] [facility]  [phone]    Schedule an appointment as soon as possible for a visit in 5 days  Recheck if problems persist, go to emergency if worse    DISCHARGE MEDICATIONS:  New Prescriptions    CLINDAMYCIN (CLEOCIN) 150 MG CAPSULE    Take 1 capsule by mouth 3 times daily for 7 days     Current Discharge Medication List          MD Caroline Vazquez MD  07/28/20 8061

## 2020-07-28 NOTE — ED NOTES
Pt discharged. Pt verbalized understanding of discharge instructions and scripts. Pt walked out per self. Pt in stable condition.      Binu Hartman LPN  55/80/11 0503

## 2020-08-19 ENCOUNTER — HOSPITAL ENCOUNTER (OUTPATIENT)
Dept: WOMENS IMAGING | Age: 60
Discharge: HOME | End: 2020-08-19
Payer: COMMERCIAL

## 2020-08-19 PROCEDURE — 77063 BREAST TOMOSYNTHESIS BI: CPT

## 2020-09-15 ENCOUNTER — TELEPHONE (OUTPATIENT)
Dept: PULMONOLOGY | Age: 60
End: 2020-09-15

## 2020-09-15 ENCOUNTER — OFFICE VISIT (OUTPATIENT)
Dept: PULMONOLOGY | Age: 60
End: 2020-09-15
Payer: COMMERCIAL

## 2020-09-15 VITALS
DIASTOLIC BLOOD PRESSURE: 70 MMHG | OXYGEN SATURATION: 98 % | SYSTOLIC BLOOD PRESSURE: 126 MMHG | HEART RATE: 80 BPM | TEMPERATURE: 97.6 F | HEIGHT: 65 IN | BODY MASS INDEX: 31.82 KG/M2 | WEIGHT: 191 LBS

## 2020-09-15 PROCEDURE — 99214 OFFICE O/P EST MOD 30 MIN: CPT | Performed by: PHYSICIAN ASSISTANT

## 2020-09-15 RX ORDER — FLUTICASONE PROPIONATE 50 MCG
2 SPRAY, SUSPENSION (ML) NASAL DAILY
Qty: 3 BOTTLE | Refills: 2 | Status: SHIPPED | OUTPATIENT
Start: 2020-09-15 | End: 2021-09-14 | Stop reason: SDUPTHER

## 2020-09-15 ASSESSMENT — ENCOUNTER SYMPTOMS
BACK PAIN: 0
COUGH: 0
ALLERGIC/IMMUNOLOGIC NEGATIVE: 1
NAUSEA: 0
SHORTNESS OF BREATH: 0
WHEEZING: 0
STRIDOR: 0
EYES NEGATIVE: 1
CHEST TIGHTNESS: 0
DIARRHEA: 0

## 2020-09-15 NOTE — PROGRESS NOTES
[facility]      Madeleine Hernandez         [id number]  9/15/2020   Chief Complaint   Patient presents with    Follow-up     DEBBIE 1yr f/u Solitario Martinez DL        Pt of Dr. Kar Napier    PAP Download:   Natali Boateng or initial AHI: 8.6     Date of initial study: 8/10/17      Compliant  100%     Noncompliant 0 %     PAP Type AirSense 10 Autoset Level  [id number]    Avg Hrs/Day 9hrs 1 min  AHI: 2.1   Recorded compliance dates , 7/29/20-8/27/20   Machine/Mfg:   [x] ResMed    [] Respironics/Dreamstation   Interface:   [] Nasal    [] Nasal pillows   [x] FFM      Provider:      [] -HMROBYN     []Bessie     [] Halima    [x] Solitario Martinez    [] Dipti               [] [facility]      [] Adaptive    [] [address]      [] Other    Neck Size: 15.5  Mallampati Mallampati 3  ESS:  13      Here is a scan of the most recent download:            Presentation:   Nish Mistry presents for sleep medicine follow up for obstructive sleep apnea  Since the last visit, Nish Mistry is doing well with PAP. She wants to try a mask with tubing on her head. Pain wakes her at night. She is taking Sinemet for RLS with good benefit. She feels tired all the time. Equipment issues: The pressure is  acceptable, the mask is acceptable     Sleep issues:  Do you feel better? No  More rested? No   Better concentration? yes    Progress History:   Since last visit any new medical issues? No  New ER or hospitlal visits? No  Any new or changes in medicines? No  Any new sleep medicines?  No        Past Medical History:   Diagnosis Date    Anemia     Backache     Depression     Diabetes mellitus ([facility] [address]     Eczema     Fibromyalgia     Fracture     Hyperlipidemia     Hypertension     Hypothyroidism     Metabolic syndrome     Migraine     Sinusitis     Sleep apnea        Past Surgical History:   Procedure Laterality Date    ARM SURGERY      BONE GRAFT  2007    Dr. Иван Johns  2003 Dr. John Herman    D&C Polyp Removal Dr. Bernard Fulton       Social History     Tobacco Use    Smoking status: Never Smoker    Smokeless tobacco: Never Used   Substance Use Topics    Alcohol use: Yes     Alcohol/week: 0.0 standard drinks     Comment: one glass every week or two    Drug use: No       Allergies   Allergen Reactions    Aspirin Other (See Comments)     Hearing?  Fluconazole     Lipitor [Atorvastatin Calcium] Other (See Comments)     Memory loss [id number]    Mepergan [Meperidine-Promethazine]     Motrin [Ibuprofen Micronized]     Viibryd [Vilazodone Hcl] Other (See Comments)    Adhesive Tape Rash     Some of them       Current Outpatient Medications   Medication Sig Dispense Refill    carbidopa-levodopa (SINEMET)  MG per tablet TAKE 1 TABLET NIGHTLY 90 tablet 3    Dulaglutide (TRULICITY) 1.5 WR/3.8LW SOPN Inject 1.5 mg into the skin once a week Next dose due 1/30      clopidogrel (PLAVIX) 75 MG tablet Take 75 mg by mouth daily      aspirin 81 MG tablet Take 81 mg by mouth daily      potassium chloride (KLOR-CON 10) 10 MEQ extended release tablet Take 10 mEq by mouth daily At nighttime      isosorbide mononitrate (IMDUR) 30 MG extended release tablet Take 15 mg by mouth daily Take in the morning (1/2 tablet)      SYNTHROID 112 MCG tablet       PRALUENT 75 MG/ML SOPN injection pen INJECT 1 SUBCUTANEOUSLY EVERY OTHER WEEK  3    cycloSPORINE (RESTASIS) 0.05 % ophthalmic emulsion 1 drop 2 times daily      valsartan (DIOVAN) 80 MG tablet Take 80 mg by mouth daily      pitavastatin (LIVALO) 2 MG TABS tablet Take by mouth nightly      CPAP Machine MISC by Does not apply route Please increase her CPAP pressure to 13 cm H20. She is complaining of insufficient pressure on current settings of CPAP pressure of 11cm H20.  Please check her CPAP for proper functioning. 1 each 0    Chlorpheniramine-Phenylephrine 4-10 MG TABS Take by mouth      Respiratory Therapy Supplies (ADULT MASK) MISC Please do mask refit and provide mask of patient's choice. She is interested in Full face mask to minimize leaks. I recommend Ruchi view mask. 1 each 0    butalbital-acetaminophen-caffeine (FIORICET, ESGIC) -40 MG per tablet Take 1 tablet by mouth every 4 hours as needed for Headaches      canagliflozin (INVOKANA) 300 MG TABS tablet Take 300 mg by mouth every morning (before breakfast)      Probiotic Product (PROBIOTIC DAILY PO) Take by mouth      tiZANidine (ZANAFLEX) 4 MG tablet Take 4 mg by mouth every 8 hours as needed      Multiple Vitamins-Minerals (OCUVITE ADULT 50+) CAPS Take by mouth daily      artificial tears (ARTIFICIAL TEARS) OINT 4 times daily      gabapentin (NEURONTIN) 300 MG capsule Take 300 mg by mouth 4 times daily. 1 tablet in the morning, 2 at lunch, 1 at dinner, 1 at nighttime      buPROPion (WELLBUTRIN XL) 150 MG XL tablet Take 300 mg by mouth every morning.  atenolol (TENORMIN) 50 MG tablet Take 50 mg by mouth daily.  Multiple Vitamin (MULTI-VITAMIN PO) Take  by mouth daily.  B Complex Vitamins (VITAMIN B COMPLEX PO) Take  by mouth daily.  Calcium Carbonate-Vitamin D (CALCIUM 600 + D) 600-400 MG-UNIT TABS Take 1 tablet by mouth daily.  Flaxseed, Linseed, (FLAXSEED OIL) 1000 MG CAPS Take  by mouth daily.  Vitamin D (CHOLECALCIFEROL) 1000 UNIT CAPS capsule Take 1,000 Units by mouth daily.  levothyroxine (SYNTHROID) 112 MCG tablet Take 1 tablet by mouth daily for 90 days. 90 tablet 3    metformin (GLUCOPHAGE XR) 500 MG XR tablet Take 1 tablet by mouth daily (with breakfast) for 30 days. (Patient taking differently: Take 500 mg by mouth 3 times daily ) 30 tablet 3     No current facility-administered medications for this visit.         Family History   Problem Relation Age of Onset    Arthritis Mother     High Blood Pressure Mother     Heart Disease Father     Stroke Father     Diabetes Sister         Review of Systems -   Review of Systems   Constitutional: Negative for activity change, appetite change, chills and fever. HENT: Negative for congestion and postnasal drip. Eyes: Negative. Respiratory: Negative for cough, chest tightness, shortness of breath, wheezing and stridor. Cardiovascular: Negative for chest pain and leg swelling. Gastrointestinal: Negative for diarrhea and nausea. Endocrine: Negative. Genitourinary: Negative. Musculoskeletal: Negative. Negative for arthralgias and back pain. Skin: Negative. Allergic/Immunologic: Negative. Neurological: Negative. Negative for dizziness and light-headedness. Psychiatric/Behavioral: Negative. All other systems reviewed and are negative. Physical Exam:    BMI:  Body mass index is 31.78 kg/m². Wt Readings from Last 3 Encounters:   09/15/20 191 lb (86.6 kg)   07/28/20 195 lb (88.5 kg)   07/20/20 195 lb (88.5 kg)     Weight stable / unchanged  Vitals: /70 (Site: Left Lower Arm, Position: Sitting, Cuff Size: Medium Adult)   Pulse 80   Temp 97.6 °F (36.4 °C) (Temporal)   Ht 5' 5\" (1.651 m)   Wt 191 lb (86.6 kg)   LMP 11/04/2015   SpO2 98% Comment: r/a  BMI 31.78 kg/m²       Physical Exam  Constitutional:       Appearance: She is well-developed. HENT:      Head: Normocephalic and atraumatic. Right Ear: External ear normal.      Left Ear: External ear normal.   Eyes:      Conjunctiva/sclera: Conjunctivae normal.      Pupils: Pupils are equal, round, and reactive to light. Neck:      Musculoskeletal: Normal range of motion and neck supple. Cardiovascular:      Rate and Rhythm: Normal rate and regular rhythm. Heart sounds: Normal heart sounds. Pulmonary:      Effort: Pulmonary effort is normal.      Breath sounds: Normal breath sounds. Musculoskeletal: Normal range of motion. Skin:     General: Skin is warm and dry. Neurological:      Mental Status: She is alert and oriented to person, place, and time. Psychiatric:         Behavior: Behavior normal.         Thought Content: Thought content normal.         Judgment: Judgment normal.           ASSESSMENT/DIAGNOSIS     Diagnosis Orders   1. Obstructive sleep apnea on CPAP     2. Restless leg syndrome     3. Obesity (BMI 30-39.9)     4. Other allergic rhinitis     5. Hypersomnia     6. Fibromyalgia              Plan   Do you need any equipment today? Yes update supplies  - Hypersomnia likely related to medications, Fibromyalgia  - She prefers to not use any medications to treat hypersomnia at this point.   - Will check Vit D  - She  was advised to continue current positive airway pressure therapy with above described pressure. - She  advised to keep good compliance with current recommended pressure to get optimal results and clinical improvement  - Recommend 7-9 hours of sleep with PAP  - She was advised to call [employer] regarding supplies if needed.   -She call my office for earlier appointment if needed for worsening of sleep symptoms.   - She was instructed on weight loss  - Ryann Oconnor was educated about my impression and plan. Patient verbalizesunderstanding.   We will see Paz Hunter back in: 1 year with download    Information added by my medical assistant/LPN was reviewed today         Sumi Rodrigues PA-C, MPAS  9/15/2020

## 2020-11-30 ENCOUNTER — TELEPHONE (OUTPATIENT)
Dept: PULMONOLOGY | Age: 60
End: 2020-11-30

## 2020-11-30 NOTE — TELEPHONE ENCOUNTER
Discussed case with Dr. Ryley Stein and he states that she is complaining of sinus pressure with PAP but he does not seen any identifiable cause.   Will change to auto to see if more comfortable

## 2020-11-30 NOTE — TELEPHONE ENCOUNTER
Mae Galicia called from Dr Sukhwinder Mckee [id number]([id number]) stating that pt has had significant weight loss and would like for you to check her pressure setting to see if everything is ok from a sleep stand point.  Pap download scanned into this encounter

## 2020-11-30 NOTE — TELEPHONE ENCOUNTER
Kelly Nelson called from Dr. Arianna Olmos office called back and he is requesting you to call him. He can reached at [phone]. Please advise.

## 2021-07-03 DIAGNOSIS — G47.61 PLMD (PERIODIC LIMB MOVEMENT DISORDER): ICD-10-CM

## 2021-08-25 ENCOUNTER — HOSPITAL ENCOUNTER (OUTPATIENT)
Dept: WOMENS IMAGING | Age: 61
Discharge: HOME | End: 2021-08-25
Payer: COMMERCIAL

## 2021-08-25 DIAGNOSIS — Z12.31 VISIT FOR SCREENING MAMMOGRAM: ICD-10-CM

## 2021-08-25 DIAGNOSIS — Z78.0 POSTMENOPAUSAL: ICD-10-CM

## 2021-08-25 PROCEDURE — 77063 BREAST TOMOSYNTHESIS BI: CPT

## 2021-08-25 PROCEDURE — 77080 DXA BONE DENSITY AXIAL: CPT

## 2021-09-14 ENCOUNTER — OFFICE VISIT (OUTPATIENT)
Dept: PULMONOLOGY | Age: 61
End: 2021-09-14
Payer: COMMERCIAL

## 2021-09-14 VITALS
HEART RATE: 67 BPM | BODY MASS INDEX: 32.42 KG/M2 | HEIGHT: 65 IN | WEIGHT: 194.6 LBS | DIASTOLIC BLOOD PRESSURE: 66 MMHG | TEMPERATURE: 97.8 F | SYSTOLIC BLOOD PRESSURE: 124 MMHG | OXYGEN SATURATION: 98 %

## 2021-09-14 DIAGNOSIS — R09.81 NASAL CONGESTION: ICD-10-CM

## 2021-09-14 DIAGNOSIS — G25.81 RESTLESS LEG SYNDROME: ICD-10-CM

## 2021-09-14 DIAGNOSIS — Z99.89 OBSTRUCTIVE SLEEP APNEA ON CPAP: Primary | ICD-10-CM

## 2021-09-14 DIAGNOSIS — E66.9 OBESITY (BMI 30-39.9): ICD-10-CM

## 2021-09-14 DIAGNOSIS — G47.33 OBSTRUCTIVE SLEEP APNEA ON CPAP: Primary | ICD-10-CM

## 2021-09-14 DIAGNOSIS — M79.7 FIBROMYALGIA: ICD-10-CM

## 2021-09-14 DIAGNOSIS — G47.61 PLMD (PERIODIC LIMB MOVEMENT DISORDER): ICD-10-CM

## 2021-09-14 PROCEDURE — 99213 OFFICE O/P EST LOW 20 MIN: CPT | Performed by: PHYSICIAN ASSISTANT

## 2021-09-14 RX ORDER — FLUTICASONE PROPIONATE 50 MCG
2 SPRAY, SUSPENSION (ML) NASAL DAILY
Qty: 3 EACH | Refills: 2 | Status: SHIPPED | OUTPATIENT
Start: 2021-09-14 | End: 2022-09-01 | Stop reason: SDUPTHER

## 2021-09-14 ASSESSMENT — ENCOUNTER SYMPTOMS
BACK PAIN: 0
SHORTNESS OF BREATH: 0
EYES NEGATIVE: 1
COUGH: 0
CHEST TIGHTNESS: 0
DIARRHEA: 0
ALLERGIC/IMMUNOLOGIC NEGATIVE: 1
NAUSEA: 0
WHEEZING: 0
STRIDOR: 0

## 2021-09-14 NOTE — PROGRESS NOTES
[facility]      Lia Olivarez         [id number]  9/14/2021   Chief Complaint   Patient presents with    Follow-up     DEBBIE 1 year with download         Pt of Dr. Mikhail Hayes    PAP Download:   Yosef [place] or initial AHI: 8.6     Date of initial study: 8/10/2017      Compliant  100%     Noncompliant 0 %     PAP Type Airsense 10 autoset Level  7/13   Avg Hrs/Day 8 hr 35 min  AHI: 2.1   Recorded compliance dates , 8/14/2021  to 9/12/2021   Machine/Mfg:   [x] ResMed    [] Respironics/Dreamstation   Interface:   [] Nasal    [] Nasal pillows   [x] FFM      Provider:      [] -ARTHUR     []Bessie     [] Halima    [x] Golden Munroe    [] Dipti               [] [facility]      [] Adaptive    [] [address]      [] Other    Neck Size: 15.5  Mallampati Mallampati 3  ESS:  12   SAQLI: 59    Here is a scan of the most recent download:            Presentation:   Dona Wilkes presents for sleep medicine follow up for obstructive sleep apnea, restless leg syndrome  Since the last visit, Dona Wilkes is doing well with PAP. She is taking Sinemet for RLS with benefit. She is taking gabapentin also with benefit for pain. She is sleeping ok. Equipment issues: The pressure is  acceptable, the mask is acceptable     Sleep issues:  Do you feel better? Yes  More rested? Sometimes   Better concentration? yes    Progress History:   Since last visit any new medical issues? No  New ER or hospital visits? No  Any new or changes in medicines? No  Any new sleep medicines? No    Review of Systems -   Review of Systems   Constitutional: Negative for activity change, appetite change, chills and fever. HENT: Negative for congestion and postnasal drip. Eyes: Negative. Respiratory: Negative for cough, chest tightness, shortness of breath, wheezing and stridor. Cardiovascular: Negative for chest pain and leg swelling. Gastrointestinal: Negative for diarrhea and nausea. Endocrine: Negative. Genitourinary: Negative. Musculoskeletal: Negative. Negative for arthralgias and back pain. Skin: Negative. Allergic/Immunologic: Negative. Neurological: Negative. Negative for dizziness and light-headedness. Psychiatric/Behavioral: Negative. All other systems reviewed and are negative. Physical Exam:    BMI:  Body mass index is 32.38 kg/m². Wt Readings from Last 3 Encounters:   09/14/21 194 lb 9.6 oz (88.3 kg)   09/15/20 191 lb (86.6 kg)   07/28/20 195 lb (88.5 kg)     Weight stable / unchanged  Vitals: /66 (Site: Left Upper Arm, Position: Sitting, Cuff Size: Large Adult)   Pulse 67   Temp 97.8 °F (36.6 °C) (Temporal)   Ht 5' 5\" (1.651 m)   Wt 194 lb 9.6 oz (88.3 kg)   LMP 11/04/2015   SpO2 98% Comment: rm air at rest  BMI 32.38 kg/m²       Physical Exam  Constitutional:       Appearance: She is well-developed. HENT:      Head: Normocephalic and atraumatic. Right Ear: External ear normal.      Left Ear: External ear normal.   Eyes:      Conjunctiva/sclera: Conjunctivae normal.      Pupils: Pupils are equal, round, and reactive to light. Cardiovascular:      Rate and Rhythm: Normal rate and regular rhythm. Heart sounds: Normal heart sounds. Pulmonary:      Effort: Pulmonary effort is normal.      Breath sounds: Normal breath sounds. Musculoskeletal:         General: Normal range of motion. Cervical back: Normal range of motion and neck supple. Skin:     General: Skin is warm and dry. Neurological:      Mental Status: She is alert and oriented to person, place, and time. Psychiatric:         Behavior: Behavior normal.         Thought Content: Thought content normal.         Judgment: Judgment normal.         ASSESSMENT/DIAGNOSIS     Diagnosis Orders   1. Obstructive sleep apnea on CPAP     2. Obesity (BMI 30-39.9)     3. Fibromyalgia     4. Restless leg syndrome     5. PLMD (periodic limb movement disorder)     6.  Nasal congestion             Plan Do you need any equipment today? Yes update supplies  - Will continue on Sinemet for RLS  - Renew Flonase   - Gabapentin and fibromyalgia likely causing hypersomnia  - Download reviewed and discussed with patient  - She  was advised to continue current positive airway pressure therapy with above described pressure. - She  advised to keep good compliance with current recommended pressure to get optimal results and clinical improvement  - Recommend 7-9 hours of sleep with PAP  - She was advised to call [employer] [facility] regarding supplies if needed.   -She call my office for earlier appointment if needed for worsening of sleep symptoms.   - She was instructed on weight loss  - Roney Borrero was educated about my impression and plan. Patient verbalizesunderstanding.   We will see Tj Matute back in: 1 year with download    Information added by my medical assistant/LPN was reviewed today         Jay Lee PA-C, MPAS  9/14/2021

## 2021-11-17 ENCOUNTER — OFFICE VISIT (OUTPATIENT)
Dept: INTERNAL MEDICINE CLINIC | Age: 61
End: 2021-11-17
Payer: COMMERCIAL

## 2021-11-17 VITALS — BODY MASS INDEX: 31.52 KG/M2 | TEMPERATURE: 97.3 F | WEIGHT: 189.4 LBS

## 2021-11-17 DIAGNOSIS — E11.9 TYPE 2 DIABETES MELLITUS WITHOUT COMPLICATION, WITHOUT LONG-TERM CURRENT USE OF INSULIN (HCC): Primary | ICD-10-CM

## 2021-11-17 LAB — HBA1C MFR BLD: 6 % (ref 4.3–5.7)

## 2021-11-17 PROCEDURE — 83036 HEMOGLOBIN GLYCOSYLATED A1C: CPT | Performed by: INTERNAL MEDICINE

## 2021-11-17 PROCEDURE — 99204 OFFICE O/P NEW MOD 45 MIN: CPT | Performed by: INTERNAL MEDICINE

## 2021-11-17 RX ORDER — LEVOCETIRIZINE DIHYDROCHLORIDE 5 MG/1
5 TABLET, FILM COATED ORAL NIGHTLY
COMMUNITY

## 2021-11-17 RX ORDER — FLASH GLUCOSE SENSOR
KIT MISCELLANEOUS
COMMUNITY
Start: 2021-09-05 | End: 2022-01-27 | Stop reason: SDUPTHER

## 2021-11-17 RX ORDER — EVOLOCUMAB 140 MG/ML
INJECTION, SOLUTION SUBCUTANEOUS
COMMUNITY
Start: 2021-11-11

## 2021-11-17 RX ORDER — ESOMEPRAZOLE MAGNESIUM 40 MG/1
40 CAPSULE, DELAYED RELEASE ORAL
COMMUNITY
Start: 2021-11-01

## 2021-11-17 SDOH — ECONOMIC STABILITY: FOOD INSECURITY: WITHIN THE PAST 12 MONTHS, THE FOOD YOU BOUGHT JUST DIDN'T LAST AND YOU DIDN'T HAVE MONEY TO GET MORE.: NEVER TRUE

## 2021-11-17 SDOH — ECONOMIC STABILITY: FOOD INSECURITY: WITHIN THE PAST 12 MONTHS, YOU WORRIED THAT YOUR FOOD WOULD RUN OUT BEFORE YOU GOT MONEY TO BUY MORE.: NEVER TRUE

## 2021-11-17 ASSESSMENT — SOCIAL DETERMINANTS OF HEALTH (SDOH): HOW HARD IS IT FOR YOU TO PAY FOR THE VERY BASICS LIKE FOOD, HOUSING, MEDICAL CARE, AND HEATING?: NOT VERY HARD

## 2021-11-17 NOTE — PROGRESS NOTES
[address] [facility] Internal Medicine   [facility] [address], [address]  Dept: [phone]  Dept Fax: [phone]        YOB: 1960    Chief Complaint   Patient presents with    Diabetes   [address]       Patient presents for evaluation of DM-2   I have never seen the patient before. This patient is followed regularly by Dr. Patricia Mallory, from [facility]. She has seen Dr. Germán Castle in the past who is a retired local endocrinologist, lately following up with endocrinologist in [address] [place] [address] area x 1 ,  decided to be followed up locally and something over the first time she does have a abel on we did an A1c 6  today,   [place] was recorded for the last 28days she is doing quite well over 93% of time is within target range of 70-1 80 no hypoglycemic symptoms average sugar is 131 7% she was running on the higher side between 181- 250. DM-2 diagnosed about oct 2012 . No cp or SOB, no low sugars and no LOC. No recent hospitalizations   Related to DM-2 , cardiac stent followed by [facility] cards. Patient Active Problem List   Diagnosis    Hypothyroidism    Hyperlipemia    Metabolic syndrome    Menopause    Depression    Insulin resistance    Hypertension    Vitamin D deficiency    Restless leg syndrome    Daytime somnolence    Fatigue    Obesity (BMI 30-39. 9)    Obstructive sleep apnea on CPAP    Facial droop       Current Outpatient Medications   Medication Sig Dispense Refill    Continuous Blood Gluc Sensor (FREESTYLE ABEL 14 DAY SENSOR) MISC       esomeprazole (NEXIUM) 40 MG delayed release capsule       REPATHA SURECLICK 983 MG/ML SOAJ       Cranberry 1000 MG CAPS Take by mouth      levocetirizine (XYZAL) 5 MG tablet Take 5 mg by mouth nightly      fluticasone (FLONASE) 50 MCG/ACT nasal spray 2 sprays by Each Nostril route daily 3 each 2    carbidopa-levodopa (SINEMET)  MG per tablet TAKE 1 TABLET NIGHTLY 90 tablet 3    CPAP Machine MISC by Does not apply route by mouth daily.  Calcium Carbonate-Vitamin D (CALCIUM 600 + D) 600-400 MG-UNIT TABS Take 1 tablet by mouth daily.  Flaxseed, Linseed, (FLAXSEED OIL) 1000 MG CAPS Take  by mouth daily. No current facility-administered medications for this visit. Past Medical History:   Diagnosis Date    Anemia     Backache     Depression     Diabetes mellitus ([address]     Eczema     Fibromyalgia     Fracture     Hyperlipidemia     Hypertension     Hypothyroidism     Metabolic syndrome     Migraine     Sinusitis     Sleep apnea        Past Surgical History:   Procedure Laterality Date    ARM SURGERY      BONE GRAFT  2007    Dr. Escalera Began placement 11/2018    CARPAL TUNNEL RELEASE      RIGHT and LEFT    CHOLECYSTECTOMY  2003    Dr. Kat Jay ARTHROSCOPY      LEEP      OTHER SURGICAL HISTORY  2000    D&C Polyp Removal Dr. Sylvester Roberson       Allergies   Allergen Reactions    Aspirin Other (See Comments)     Hearing?  Fluconazole     Lipitor [Atorvastatin Calcium] Other (See Comments)     Memory loss [id number]    Mepergan [Meperidine-Promethazine]     Motrin [Ibuprofen Micronized]     Viibryd [Vilazodone Hcl] Other (See Comments)    Adhesive Tape Rash     Some of them       Social History     Socioeconomic History    Marital status:      Spouse name: Not on file    Number of children: Not on file    Years of education: Not on file    Highest education level: Not on file   Occupational History    Not on file   Tobacco Use    Smoking status: Never Smoker    Smokeless tobacco: Never Used   Vaping Use    Vaping Use: Never used   Substance and Sexual Activity    Alcohol use:  Yes     Alcohol/week: 0.0 standard drinks     Comment: one glass every week or two    Drug use: No    Sexual activity: Yes   Other Topics Concern    Not on file   Social History Narrative    Not on file     Social Determinants of Health     Financial Resource Strain: Low Risk     Difficulty of Paying Living Expenses: Not very hard   Food Insecurity: No Food Insecurity    Worried About Running Out of Food in the Last Year: Never true    Nichelle of Food in the Last Year: Never true   Transportation Needs:     Lack of Transportation (Medical): Not on file    Lack of Transportation (Non-Medical):  Not on file   Physical Activity:     Days of Exercise per Week: Not on file    Minutes of Exercise per Session: Not on file   Stress:     Feeling of Stress : Not on file   Social Connections:     Frequency of Communication with Friends and Family: Not on file    Frequency of Social Gatherings with Friends and Family: Not on file    Attends Tenriism Services: Not on file    Active Member of [address] [place] [address] [employer] or Organizations: Not on file    Attends Club or Organization Meetings: Not on file    Marital Status: Not on file   Intimate Partner Violence:     Fear of Current or Ex-Partner: Not on file    Emotionally Abused: Not on file    Physically Abused: Not on file    Sexually Abused: Not on file   Housing Stability:     Unable to Pay for Housing in the Last Year: Not on file    Number of [place] in the Last Year: Not on file    Unstable Housing in the Last Year: Not on file       Family History   Problem Relation Age of Onset    Arthritis Mother     High Blood Pressure Mother     Heart Disease Father     Stroke Father     Diabetes Sister     Ovarian Cancer Paternal Grandmother         unknown age       Review of Systems     See HPI    Temp 97.3 °F (36.3 °C)   Wt 189 lb 6.4 oz (85.9 kg)   LMP 11/04/2015   BMI 31.52 kg/m²     Physical Examination: General appearance - alert, well appearing, and in no distress and overweight  Mental status - alert, oriented to person, place, and time  Neck - supple, no significant adenopathy  Chest - clear to auscultation, no wheezes, rales or rhonchi, symmetric air entry  Heart - normal rate, regular rhythm, normal S1, S2, no murmurs, rubs, clicks or gallops  Abdomen - soft, nontender, nondistended, no masses or organomegaly  Neurological - alert, oriented, normal speech, no focal findings or movement disorder noted, Babinski sign negative  Musculoskeletal - no joint tenderness, deformity or swelling  Extremities - peripheral pulses normal, no pedal edema, no clubbing or cyanosis, Carina's sign negative bilaterally  Skin - normal coloration and turgor, no rashes, no suspicious skin lesions noted     I have reviewed recent diagnostic testing including labs and EKG. Diagnosis Orders   1. Type 2 diabetes mellitus without complication, without long-term current use of insulin ([facility])  POCT glycosylated hemoglobin (Hb A1C)    56819 - Collection Capillary Blood Specimen     DIABETIC FOOT EXAM    SENSATION: intact  PROPRIOCEPTION: intact  VIBRATORY SENSE: intact  No  ulcers, erythema or other lesions noted  Pulses present and normal      Orders Placed This Encounter   Procedures    POCT glycosylated hemoglobin (Hb A1C)    62958 - Collection Capillary Blood Specimen       PLAN:    DM-2  Well controlled on the current regimen, on trulicity 1.5 mg per week and on glucophage, no active GI issues. Will obtain CMP in about a month she follows with   Dr. Mckenna Gamboa at [facility] for her CAD. Based on the current abel and A1c readings as outlined above , I am pleased with her   Blood sugar control. Pt was given education material   Re DM-2 . Obtain a CMP mid January . AIC is 6.0 , and average sugar on abel is 131. Also a lipid panel, fasting. Last one in the system is from 2020. Thank you for asking me to evaluate Kimberly Lawrence today, please call with any questions. RTO 4 months       Signed by : Erin Yu M.D.     [address] [facility] Internal Medicine  [address] [facility], [address]    Tel  [phone]  Fax [phone]

## 2021-11-17 NOTE — PATIENT INSTRUCTIONS
Get labs mid January before you see dr. Zac Akhtar   Fasting overnight 8-10 hrs Patient Education        Diabetes and Alcohol: Care Instructions  Your Care Instructions     People who have diabetes need to be more careful with alcohol. Before you drink, consider a few things: Is your diabetes well controlled? Do you know how drinking alcohol can affect you? Do you have high blood pressure, nerve damage, or eye problems from your diabetes? If you take insulin or another medicine for diabetes, drinking alcohol may cause low blood sugar. This could cause dangerous low blood sugar levels. Too much alcohol can also affect your ability to know your blood sugar is low and to treat it. Drinking alcohol can make you lightheaded at first and drowsy as you drink more, both of which may be similar to the symptoms of low blood sugar. Drinking a lot of alcohol over a long period of time can damage your liver (cirrhosis). If this happens, your body may lose its natural response to protect itself from low blood sugar. If you are controlling your diabetes and do not have other health issues, it may be okay to have a drink once in a while. Learning how alcohol affects your body can help you make the right choices. Follow-up care is a key part of your treatment and safety. Be sure to make and go to all appointments, and call your doctor if you are having problems. It's also a good idea to know your test results and keep a list of the medicines you take. How can you care for yourself at home? If you drink  · Work with your doctor or other diabetes expert to find what is best for you. Make sure you know whether it is safe to drink if you are taking insulin or another medicine for diabetes. · In general, limit alcohol to 1 drink a day with a meal if you are a woman. If you are a man, limit alcohol to 2 drinks a day with a meal. The following is considered a standard drink:  ? One 12-ounce bottle of beer or wine cooler  ?  One 5-ounce glass of wine  ? One mixed drink with 1.5 ounces of 80-proof hard liquor, such as gin, whiskey, or rum  · Choose alcoholic drinks wisely. With hard alcohol, use sugar-free mixers, such as diet tonic, water, or club soda. Pick drinks that have less alcohol, including light beer or dry wine. Or add club soda to wine to dilute it. Also remember that most alcoholic drinks have a lot of calories. · When you drink, check your blood sugar before you go to bed. Have a snack before bed so your blood sugar does not drop while you sleep. When not to drink  · Never drink on an empty stomach. If you do drink alcohol, drink it only with a meal or snack. Having as little as 2 drinks on an empty stomach could lead to low blood sugar. · Do not drink alcohol if you have problems recognizing the signs of low blood sugar until they become severe. · Do not drink alcohol after you exercise. The exercise itself lowers blood sugar. · Do not drink if you have nerve damage. Drinking can make it worse and increase the pain, numbness, and other symptoms. · Do not drink if you have high blood pressure. · Do not drink if you have diabetic eye disease. · Do not drink if you have high triglycerides, a type of fat in your blood. Drinking can raise triglycerides. · Do not drink if you are trying to lose weight. Alcohol provides empty calories that do not give you any nutrients. · Do not drink and drive. The effects of alcohol are greater if you have low blood sugar. When should you call for help? Call 911 anytime you think you may need emergency care. For example, call if:    · You passed out (lost consciousness).     · You are confused or cannot think clearly.     · Your blood sugar is very high or very low. Watch closely for changes in your health, and be sure to contact your doctor if:    · Your blood sugar stays outside the level your doctor set for you.     · You have any problems. Where can you learn more?   Go to [email or web address].[email or web address]". org and sign in to your [employer] account. Enter T236 in the [facility] box to learn more about \"Diabetes and Alcohol: Care Instructions. \"     If you do not have an account, please click on the \"Sign Up Now\" link. Current as of: August 31, 2020               Content Version: 13.0  © 9513-1096 [employer]. Care instructions adapted under license by [facility] ([facility]). If you have questions about a medical condition or this instruction, always ask your healthcare professional. [address] any warranty or liability for your use of this information.

## 2021-12-07 ENCOUNTER — HOSPITAL ENCOUNTER (EMERGENCY)
Age: 61
Discharge: HOME | End: 2021-12-07
Payer: COMMERCIAL

## 2021-12-07 VITALS
HEART RATE: 77 BPM | BODY MASS INDEX: 30.79 KG/M2 | SYSTOLIC BLOOD PRESSURE: 128 MMHG | TEMPERATURE: 96.4 F | OXYGEN SATURATION: 97 % | WEIGHT: 185 LBS | RESPIRATION RATE: 18 BRPM | DIASTOLIC BLOOD PRESSURE: 60 MMHG

## 2021-12-07 DIAGNOSIS — J01.00 ACUTE NON-RECURRENT MAXILLARY SINUSITIS: Primary | ICD-10-CM

## 2021-12-07 PROCEDURE — 99213 OFFICE O/P EST LOW 20 MIN: CPT

## 2021-12-07 PROCEDURE — 99213 OFFICE O/P EST LOW 20 MIN: CPT | Performed by: NURSE PRACTITIONER

## 2021-12-07 RX ORDER — DOXYCYCLINE HYCLATE 100 MG
100 TABLET ORAL 2 TIMES DAILY
Qty: 20 TABLET | Refills: 0 | Status: SHIPPED | OUTPATIENT
Start: 2021-12-07 | End: 2021-12-17

## 2021-12-07 ASSESSMENT — ENCOUNTER SYMPTOMS
COUGH: 1
SORE THROAT: 0
SINUS PRESSURE: 1
DIARRHEA: 0
EYE DISCHARGE: 0
RHINORRHEA: 1
CHEST TIGHTNESS: 1
VOMITING: 0
SHORTNESS OF BREATH: 0
EYE REDNESS: 0
SINUS PAIN: 1
TROUBLE SWALLOWING: 0
NAUSEA: 0
WHEEZING: 0

## 2021-12-07 ASSESSMENT — PAIN SCALES - GENERAL: PAINLEVEL_OUTOF10: 7

## 2021-12-07 ASSESSMENT — PAIN DESCRIPTION - DESCRIPTORS: DESCRIPTORS: PRESSURE

## 2021-12-07 ASSESSMENT — PAIN DESCRIPTION - PAIN TYPE: TYPE: ACUTE PAIN

## 2021-12-07 ASSESSMENT — PAIN DESCRIPTION - LOCATION: LOCATION: HEAD

## 2021-12-07 NOTE — ED TRIAGE NOTES
Pt walked to room 6. Pt here with complaints of a raspy cough, sinus pressure. Diarrhea November 7-13. On November 19 pt started with cough and fever. Pt was tested for covid on November 27. Negative. On December 4 had a sinus headache. Pt also having trouble hearing also.

## 2021-12-07 NOTE — ED PROVIDER NOTES
[id number]       Chief Complaint   Patient presents with    Cough     Raspy cough and sinus pressure. Has been going on since November 19. Nurses Notes reviewed and I agree except as noted in the HPI. HISTORY OF PRESENT ILLNESS   Sharon Tripp is a 59 y.o. female who presents for evaluation of cough and sinus problems. Onset of symptoms on/around December 19, 2021. Symptoms began with a cough and fever. States fever resolved. She then states that symptoms worsened to include sinus congestion with pressure/pain. Rates 7/10. History of intermittent sinus infections. No improvement with current treatment. REVIEW OF SYSTEMS     Review of Systems   Constitutional: Negative for chills, diaphoresis, fatigue and fever. HENT: Positive for congestion, nosebleeds (blood tinged mucus), postnasal drip, rhinorrhea, sinus pressure and sinus pain. Negative for ear pain, sore throat and trouble swallowing. Eyes: Negative for discharge and redness. Respiratory: Positive for cough and chest tightness. Negative for shortness of breath and wheezing. Cardiovascular: Negative for chest pain. Gastrointestinal: Negative for diarrhea, nausea and vomiting. Genitourinary: Negative for decreased urine volume. Musculoskeletal: Negative for neck pain and neck stiffness. Skin: Negative for rash. Neurological: Positive for headaches. Hematological: Negative for adenopathy. Psychiatric/Behavioral: Negative for sleep disturbance. PAST MEDICAL HISTORY         Diagnosis Date    Anemia     Backache     Depression     Diabetes mellitus ([address]     Eczema     Fibromyalgia     Fracture     Hyperlipidemia     Hypertension     Hypothyroidism     Metabolic syndrome     Migraine     Sinusitis     Sleep apnea        SURGICAL HISTORY     Patient  has a past surgical history that includes Tonsillectomy (1962);  Cholecystectomy (2003); turbinate resection (1991); other surgical history (2000); bone graft (2007); Dilation and curettage of uterus; Carpal tunnel release; LEEP; Arm Surgery; Knee arthroscopy; and Cardiac catheterization. CURRENT MEDICATIONS       Previous Medications    ARTIFICIAL TEARS (ARTIFICIAL TEARS) OINT    4 times daily    ASPIRIN 81 MG TABLET    Take 81 mg by mouth daily    ATENOLOL (TENORMIN) 50 MG TABLET    Take 50 mg by mouth daily. B COMPLEX VITAMINS (VITAMIN B COMPLEX PO)    Take  by mouth daily. BUPROPION (WELLBUTRIN XL) 150 MG XL TABLET    Take 300 mg by mouth every morning. BUTALBITAL-ACETAMINOPHEN-CAFFEINE (FIORICET, ESGIC) -40 MG PER TABLET    Take 1 tablet by mouth every 4 hours as needed for Headaches    CALCIUM CARBONATE-VITAMIN D (CALCIUM 600 + D) 600-400 MG-UNIT TABS    Take 1 tablet by mouth daily. CANAGLIFLOZIN (INVOKANA) 300 MG TABS TABLET    Take 300 mg by mouth every morning (before breakfast)    CARBIDOPA-LEVODOPA (SINEMET)  MG PER TABLET    TAKE 1 TABLET NIGHTLY    CLOPIDOGREL (PLAVIX) 75 MG TABLET    Take 75 mg by mouth daily    CONTINUOUS BLOOD GLUC SENSOR ([employer]YLE MARLO 14 DAY SENSOR) MISC        CPAP MACHINE MISC    by Does not apply route Please change CPAP pressure to auto 7-13 cm H20. Download in 2 weeks    CRANBERRY 1000 MG CAPS    Take by mouth    CYCLOSPORINE (RESTASIS) 0.05 % OPHTHALMIC EMULSION    1 drop 2 times daily    DULAGLUTIDE (TRULICITY) 1.5 NS/7.9UN SOPN    Inject 1.5 mg into the skin once a week Next dose due 1/30    ESOMEPRAZOLE (NEXIUM) 40 MG DELAYED RELEASE CAPSULE        FLAXSEED, LINSEED, (FLAXSEED OIL) 1000 MG CAPS    Take  by mouth daily. FLUTICASONE (FLONASE) 50 MCG/ACT NASAL SPRAY    2 sprays by Each Nostril route daily    GABAPENTIN (NEURONTIN) 300 MG CAPSULE    Take 300 mg by mouth 4 times daily.  2 tablet in the morning, 1 at lunch, 1 at dinner, 2 at nighttime    ISOSORBIDE MONONITRATE (IMDUR) 30 MG EXTENDED RELEASE TABLET Take 15 mg by mouth daily Take in the morning (1/2 tablet)    LEVOCETIRIZINE (XYZAL) 5 MG TABLET    Take 5 mg by mouth nightly    LEVOTHYROXINE (SYNTHROID) 112 MCG TABLET    Take 1 tablet by mouth daily for 90 days. METFORMIN (GLUCOPHAGE XR) 500 MG XR TABLET    Take 1 tablet by mouth daily (with breakfast) for 30 days. MULTIPLE VITAMIN (MULTI-VITAMIN PO)    Take  by mouth daily. POTASSIUM CHLORIDE (KLOR-CON 10) 10 MEQ EXTENDED RELEASE TABLET    Take 10 mEq by mouth daily At nighttime    PROBIOTIC PRODUCT (PROBIOTIC DAILY PO)    Take by mouth    REPATHA SURECLICK 714 MG/ML SOAJ        RESPIRATORY THERAPY SUPPLIES (ADULT MASK) MISC    Please do mask refit and provide mask of patient's choice. She is interested in Full face mask to minimize leaks. I recommend Ruchi view mask. VALSARTAN (DIOVAN) 80 MG TABLET    Take 80 mg by mouth daily       ALLERGIES     Patient is is allergic to aspirin, fluconazole, lipitor [atorvastatin calcium], mepergan [meperidine-promethazine], motrin [ibuprofen micronized], viibryd [vilazodone hcl], and adhesive tape. FAMILY HISTORY     Patient'sfamily history includes Arthritis in her mother; Diabetes in her sister; Heart Disease in her father; High Blood Pressure in her mother; Ovarian Cancer in her paternal grandmother; Stroke in her father. SOCIAL HISTORY     Patient  reports that she has never smoked. She has never used smokeless tobacco. She reports current alcohol use. She reports that she does not use drugs. PHYSICAL EXAM     ED TRIAGE VITALS  BP: 128/60, Temp: 96.4 °F (35.8 °C), Pulse: 77, Resp: 18, SpO2: 97 %  Physical Exam  Vitals and nursing note reviewed. Constitutional:       General: She is not in acute distress. Appearance: Normal appearance. She is well-developed. She is not ill-appearing, toxic-appearing or diaphoretic. HENT:      Head: Normocephalic and atraumatic.       Right Ear: Hearing, tympanic membrane, ear canal and external ear normal. No mastoid tenderness. No hemotympanum. Tympanic membrane is not perforated, erythematous or bulging. Left Ear: Hearing, tympanic membrane, ear canal and external ear normal. No mastoid tenderness. No hemotympanum. Tympanic membrane is not perforated, erythematous or bulging. Nose: Congestion present. Mouth/Throat:      Mouth: Mucous membranes are moist.      Pharynx: Oropharynx is clear. Uvula midline. Tonsils: 0 on the right. 0 on the left. Eyes:      General: No scleral icterus. Conjunctiva/sclera: Conjunctivae normal.      Right eye: Right conjunctiva is not injected. No hemorrhage. Left eye: Left conjunctiva is not injected. No hemorrhage. Neck:      Thyroid: No thyromegaly. Trachea: Trachea normal.   Cardiovascular:      Rate and Rhythm: Normal rate and regular rhythm. No extrasystoles are present. Chest Wall: PMI is not displaced. Heart sounds: Normal heart sounds. No murmur heard. No friction rub. No gallop. Pulmonary:      Effort: Pulmonary effort is normal. No respiratory distress. Breath sounds: Normal breath sounds. Chest:   Breasts:      Right: No supraclavicular adenopathy. Left: No supraclavicular adenopathy. Musculoskeletal:      Cervical back: Normal range of motion and neck supple. Lymphadenopathy:      Head:      Right side of head: No submental, submandibular, tonsillar or occipital adenopathy. Left side of head: No submental, submandibular, tonsillar or occipital adenopathy. Cervical: No cervical adenopathy. Upper Body:      Right upper body: No supraclavicular adenopathy. Left upper body: No supraclavicular adenopathy. Skin:     General: Skin is warm and dry. Capillary Refill: Capillary refill takes less than 2 seconds. Coloration: Skin is not pale. Findings: No rash. Comments: Skin warm and dry to touch, no rashes noted on exposed surfaces.    Neurological:      Mental Status: She is alert and oriented to person, place, and time. She is not disoriented. Psychiatric:         Mood and Affect: Mood normal.         Behavior: Behavior is cooperative. DIAGNOSTIC RESULTS   Labs: No results found for this visit on 12/07/21. IMAGING:  No orders to display     URGENT CARE COURSE:     Vitals:    12/07/21 1416   BP: 128/60   Pulse: 77   Resp: 18   Temp: 96.4 °F (35.8 °C)   TempSrc: Temporal   SpO2: 97%   Weight: 185 lb (83.9 kg)       Medications - No data to display  PROCEDURES:  None  FINALIMPRESSION      1. Acute non-recurrent maxillary sinusitis        DISPOSITION/PLAN   DISPOSITION Decision To Discharge 12/07/2021 02:29:05 PM  Nontoxic, no distress. Exam consistent with maxillary sinusitis. Medication as prescribed, continue current treatment. If symptoms worsen return or go to ER. PATIENT REFERRED TO:  MD Carlton Pablo   [address]  [phone]      Follow-up with PCP as needed. Medication as prescribed. Continue current treatment. If symptoms worsen return or go to ER.     DISCHARGE MEDICATIONS:  New Prescriptions    DOXYCYCLINE HYCLATE (VIBRA-TABS) 100 MG TABLET    Take 1 tablet by mouth 2 times daily for 10 days     Current Discharge Medication List          [address], APRN - CNP  12/07/21 1435

## 2022-01-13 LAB
A/G RATIO: 1.5 (ref 1.5–2.5)
ALBUMIN SERPL-MCNC: 4.3 GM/DL (ref 3.5–5)
ALP BLD-CCNC: 82 IU/L (ref 39–118)
ALT SERPL-CCNC: 9 IU/L (ref 10–40)
ANION GAP SERPL CALCULATED.3IONS-SCNC: 5 MMOL/L (ref 4–12)
AST SERPL-CCNC: 13 IU/L (ref 15–41)
BILIRUB SERPL-MCNC: 0.4 MG/DL (ref 0.2–1)
BUN BLDV-MCNC: 17 MG/DL (ref 7–20)
CALCIUM SERPL-MCNC: 9.5 MG/DL (ref 8.8–10.5)
CHLORIDE BLD-SCNC: 104 MEQ/L (ref 101–111)
CHOLESTEROL/HDL RELATIVE RISK: 2.8 (ref 4–4.4)
CHOLESTEROL: 148 MG/DL
CO2: 31 MEQ/L (ref 21–32)
CREAT SERPL-MCNC: 0.67 MG/DL (ref 0.6–1.3)
CREATININE CLEARANCE: >60
DIRECT-LDL / HDL RISK: 1.6
GLUCOSE: 131 MG/DL (ref 70–110)
HDLC SERPL-MCNC: 52 MG/DL
LDL CHOLESTEROL DIRECT: 86 MG/DL
POTASSIUM SERPL-SCNC: 4.1 MEQ/L (ref 3.6–5)
SODIUM BLD-SCNC: 140 MEQ/L (ref 135–145)
TOTAL PROTEIN: 7.2 G/DL (ref 6.2–8)
TRIGL SERPL-MCNC: 157 MG/DL
VLDLC SERPL CALC-MCNC: 31 MG/DL

## 2022-01-27 RX ORDER — FLASH GLUCOSE SENSOR
KIT MISCELLANEOUS
Qty: 6 EACH | Refills: 1 | Status: SHIPPED | OUTPATIENT
Start: 2022-01-27 | End: 2022-03-23 | Stop reason: SDUPTHER

## 2022-01-27 RX ORDER — METFORMIN HYDROCHLORIDE 500 MG/1
500 TABLET, EXTENDED RELEASE ORAL
Qty: 30 TABLET | Refills: 3 | Status: SHIPPED | OUTPATIENT
Start: 2022-01-27 | End: 2022-03-23

## 2022-01-27 RX ORDER — DULAGLUTIDE 1.5 MG/.5ML
INJECTION, SOLUTION SUBCUTANEOUS
Qty: 6 ML | Refills: 1 | Status: SHIPPED | OUTPATIENT
Start: 2022-01-27 | End: 2022-03-23 | Stop reason: SDUPTHER

## 2022-02-09 ENCOUNTER — TELEPHONE (OUTPATIENT)
Dept: INTERNAL MEDICINE CLINIC | Age: 62
End: 2022-02-09

## 2022-02-09 NOTE — TELEPHONE ENCOUNTER
Pt. Called earlier today to talk about her Metformin. I called her back to get the dosage. She used to do 3 tablets, 500 MG each, every day. Each tablet was before her three meals. However, this new Metformin is a 1 times a day 500 MG ER tablet that is taken before breakfast. The Pt. Wants to know if this is correct or a mistake. She is currently using her old dosage for the time being. Should this script be changed back or is this going to remain?

## 2022-02-09 NOTE — TELEPHONE ENCOUNTER
----- Message from Yocasta Hamm sent at 2/3/2022 10:13 AM EST -----  Subject: Medication Problem    QUESTIONS  Name of Medication? metFORMIN (GLUCOPHAGE XR) 500 MG extended release   tablet  Patient-reported dosage and instructions? metFORMIN (GLUCOPHAGE XR) 500 MG   extended release table and Take 1 tablet by mouth daily (with breakfast)  What question or problem do you have with the medication? Patient had a   question whether not she should start taking the extended relief tablet   because they are not same as the 3 500mg tablet didn't know if this was   something new for her to try please advise and doesn't need any more   metFORMIN (GLUCOPHAGE XR   Preferred Pharmacy? [address] [phone] [place] [phone]  Pharmacy phone number (if available)? [phone]  Additional Information for Provider? Patient also wants to if she should   keep taking the old ones or switch to the new metFORMIN (GLUCOPHAGE XR and   would like a call back   ---------------------------------------------------------------------------  --------------  CALL BACK INFO  What is the best way for the office to contact you? OK to leave message on   voicemail  Preferred Call Back Phone Number? [phone]  ---------------------------------------------------------------------------  --------------  SCRIPT ANSWERS  Relationship to Patient?  Self

## 2022-03-23 ENCOUNTER — OFFICE VISIT (OUTPATIENT)
Dept: INTERNAL MEDICINE CLINIC | Age: 62
End: 2022-03-23
Payer: COMMERCIAL

## 2022-03-23 VITALS
BODY MASS INDEX: 32.12 KG/M2 | SYSTOLIC BLOOD PRESSURE: 106 MMHG | HEART RATE: 80 BPM | WEIGHT: 193 LBS | DIASTOLIC BLOOD PRESSURE: 64 MMHG | TEMPERATURE: 97.2 F

## 2022-03-23 DIAGNOSIS — E03.8 OTHER SPECIFIED HYPOTHYROIDISM: ICD-10-CM

## 2022-03-23 DIAGNOSIS — E11.9 TYPE 2 DIABETES MELLITUS WITHOUT COMPLICATION, WITHOUT LONG-TERM CURRENT USE OF INSULIN (HCC): Primary | ICD-10-CM

## 2022-03-23 LAB — HBA1C MFR BLD: 6.3 % (ref 4.3–5.7)

## 2022-03-23 PROCEDURE — 99214 OFFICE O/P EST MOD 30 MIN: CPT | Performed by: INTERNAL MEDICINE

## 2022-03-23 PROCEDURE — 83036 HEMOGLOBIN GLYCOSYLATED A1C: CPT | Performed by: INTERNAL MEDICINE

## 2022-03-23 PROCEDURE — 3044F HG A1C LEVEL LT 7.0%: CPT | Performed by: INTERNAL MEDICINE

## 2022-03-23 RX ORDER — LANCETS 30 GAUGE
1 EACH MISCELLANEOUS DAILY
Qty: 100 EACH | Refills: 3 | Status: SHIPPED | OUTPATIENT
Start: 2022-03-23

## 2022-03-23 RX ORDER — FLASH GLUCOSE SENSOR
KIT MISCELLANEOUS
Qty: 6 EACH | Refills: 2 | Status: SHIPPED | OUTPATIENT
Start: 2022-03-23

## 2022-03-23 RX ORDER — LEVOTHYROXINE SODIUM 112 UG/1
112 TABLET ORAL DAILY
Qty: 90 TABLET | Refills: 3 | Status: SHIPPED | OUTPATIENT
Start: 2022-03-23 | End: 2022-09-01

## 2022-03-23 RX ORDER — DULAGLUTIDE 1.5 MG/.5ML
INJECTION, SOLUTION SUBCUTANEOUS
Qty: 6 ML | Refills: 3 | Status: SHIPPED | OUTPATIENT
Start: 2022-03-23 | End: 2022-07-28 | Stop reason: SDUPTHER

## 2022-03-23 RX ORDER — METFORMIN HYDROCHLORIDE 500 MG/1
500 TABLET, EXTENDED RELEASE ORAL
Qty: 30 TABLET | Refills: 3 | Status: CANCELLED | OUTPATIENT
Start: 2022-03-23 | End: 2022-04-22

## 2022-03-23 RX ORDER — CANAGLIFLOZIN 100 MG/1
TABLET, FILM COATED ORAL
Qty: 90 TABLET | Refills: 1 | Status: SHIPPED | OUTPATIENT
Start: 2022-03-23 | End: 2022-03-31 | Stop reason: RX

## 2022-03-23 ASSESSMENT — PATIENT HEALTH QUESTIONNAIRE - PHQ9
SUM OF ALL RESPONSES TO PHQ QUESTIONS 1-9: 10
SUM OF ALL RESPONSES TO PHQ9 QUESTIONS 1 & 2: 2
4. FEELING TIRED OR HAVING LITTLE ENERGY: 1
5. POOR APPETITE OR OVEREATING: 3
8. MOVING OR SPEAKING SO SLOWLY THAT OTHER PEOPLE COULD HAVE NOTICED. OR THE OPPOSITE, BEING SO FIGETY OR RESTLESS THAT YOU HAVE BEEN MOVING AROUND A LOT MORE THAN USUAL: 1
SUM OF ALL RESPONSES TO PHQ QUESTIONS 1-9: 10
7. TROUBLE CONCENTRATING ON THINGS, SUCH AS READING THE NEWSPAPER OR WATCHING TELEVISION: 1
3. TROUBLE FALLING OR STAYING ASLEEP: 1
SUM OF ALL RESPONSES TO PHQ QUESTIONS 1-9: 10
9. THOUGHTS THAT YOU WOULD BE BETTER OFF DEAD, OR OF HURTING YOURSELF: 0
6. FEELING BAD ABOUT YOURSELF - OR THAT YOU ARE A FAILURE OR HAVE LET YOURSELF OR YOUR FAMILY DOWN: 1
1. LITTLE INTEREST OR PLEASURE IN DOING THINGS: 1
2. FEELING DOWN, DEPRESSED OR HOPELESS: 1
SUM OF ALL RESPONSES TO PHQ QUESTIONS 1-9: 10
10. IF YOU CHECKED OFF ANY PROBLEMS, HOW DIFFICULT HAVE THESE PROBLEMS MADE IT FOR YOU TO DO YOUR WORK, TAKE CARE OF THINGS AT HOME, OR GET ALONG WITH OTHER PEOPLE: 0

## 2022-03-23 NOTE — PROGRESS NOTES
[address] [facility] Internal Medicine   [facility] [address]  [facility], [address]  Dept: [phone]  Dept Fax: [phone]        YOB: 1960    Chief Complaint   Patient presents with   [address] [facility] [address] Diabetes     A1C needed AND foot exam    Hypertension    Hypothyroidism       Patient presents for evaluation of DM-2   I have never seen the patient before. This patient is followed regularly by Dr. Aftab Hoang, from [facility]. She has seen Dr. Ana Hodgson in the past who is a retired local endocrinologist, lately following up with endocrinologist in [facility] area x 1 ,  decided to be followed up locally and she does have a abel on we did an A1c 6.3  Today, vs 6 last visit. Mykel Griffin was recorded for the last 28days she is doing quite well over 89% of time is within target range of   Related to DM-2 , cardiac stent followed by [facility] cardiology . Pleasant obese WF not in distress. Patient Active Problem List   Diagnosis    Hypothyroidism    Hyperlipemia    Metabolic syndrome    Menopause    Depression    Insulin resistance    Hypertension    Vitamin D deficiency    Restless leg syndrome    Daytime somnolence    Fatigue    Obesity (BMI 30-39. 9)    Obstructive sleep apnea on CPAP    Facial droop       Current Outpatient Medications   Medication Sig Dispense Refill    levothyroxine (SYNTHROID) 112 MCG tablet Take 1 tablet by mouth daily 90 tablet 3    Continuous Blood Gluc Sensor (FREESTYLE ABEL 14 DAY SENSOR) MISC Apply 1 sensor every 14 days. 6 each 2    Dulaglutide (TRULICITY) 1.5 AR/0.5IZ SOPN Inject 1.5 MG once weekly.  6 mL 3    Lancets MISC 1 each by Does not apply route daily 100 each 3    canagliflozin (INVOKANA) 100 MG TABS tablet New lower dose one daily 90 tablet 1    metFORMIN (GLUCOPHAGE) 500 MG tablet Take 1 tablet by mouth 2 times daily (with meals) New dose 180 tablet 1    esomeprazole (NEXIUM) 40 MG delayed release capsule       REPATHA SURECLICK 550 MG/ML SOAJ       Cranberry 1000 MG CAPS Take by mouth      levocetirizine (XYZAL) 5 MG tablet Take 5 mg by mouth nightly      fluticasone (FLONASE) 50 MCG/ACT nasal spray 2 sprays by Each Nostril route daily 3 each 2    carbidopa-levodopa (SINEMET)  MG per tablet TAKE 1 TABLET NIGHTLY 90 tablet 3    CPAP Machine MISC by Does not apply route Please change CPAP pressure to auto 7-13 cm H20. Download in 2 weeks 1 each 0    clopidogrel (PLAVIX) 75 MG tablet Take 75 mg by mouth daily      aspirin 81 MG tablet Take 81 mg by mouth daily      potassium chloride (KLOR-CON 10) 10 MEQ extended release tablet Take 10 mEq by mouth daily At nighttime      isosorbide mononitrate (IMDUR) 30 MG extended release tablet Take 15 mg by mouth daily Take in the morning (1/2 tablet)      cycloSPORINE (RESTASIS) 0.05 % ophthalmic emulsion 1 drop 2 times daily      valsartan (DIOVAN) 80 MG tablet Take 80 mg by mouth daily      Respiratory Therapy Supplies (ADULT MASK) MISC Please do mask refit and provide mask of patient's choice. She is interested in Full face mask to minimize leaks. I recommend Ruchi view mask. 1 each 0    butalbital-acetaminophen-caffeine (FIORICET, ESGIC) -40 MG per tablet Take 1 tablet by mouth every 4 hours as needed for Headaches      Probiotic Product (PROBIOTIC DAILY PO) Take by mouth      artificial tears (ARTIFICIAL TEARS) OINT 4 times daily      gabapentin (NEURONTIN) 300 MG capsule Take 300 mg by mouth 4 times daily. 2 tablet in the morning, 1 at lunch, 1 at dinner, 2 at nighttime      buPROPion (WELLBUTRIN XL) 150 MG XL tablet Take 300 mg by mouth every morning.  atenolol (TENORMIN) 50 MG tablet Take 50 mg by mouth daily.  Multiple Vitamin (MULTI-VITAMIN PO) Take  by mouth daily.  B Complex Vitamins (VITAMIN B COMPLEX PO) Take  by mouth daily.  Calcium Carbonate-Vitamin D (CALCIUM 600 + D) 600-400 MG-UNIT TABS Take 1 tablet by mouth daily.         Flaxseed, Linseed, (FLAXSEED OIL) 1000 MG CAPS Take  by mouth daily. No current facility-administered medications for this visit. Past Medical History:   Diagnosis Date    Anemia     Backache     Depression     Diabetes mellitus ([address]     Eczema     Fibromyalgia     Fracture     Hyperlipidemia     Hypertension     Hypothyroidism     Metabolic syndrome     Migraine     Sinusitis     Sleep apnea        Past Surgical History:   Procedure Laterality Date    ARM SURGERY      BONE GRAFT  2007    Dr. Guero Lebron placement 11/2018    CARPAL TUNNEL RELEASE      RIGHT and LEFT    CHOLECYSTECTOMY  2003    Dr. Dorys Higginbotham ARTHROSCOPY      LEEP      OTHER SURGICAL HISTORY  2000    D&C Polyp Removal Dr. Cyndi Madsen Dural       Allergies   Allergen Reactions    Aspirin Other (See Comments)     Hearing?  Fluconazole     Lipitor [Atorvastatin Calcium] Other (See Comments)     Memory loss [id number]    Mepergan [Meperidine-Promethazine]     Motrin [Ibuprofen Micronized]     Viibryd [Vilazodone Hcl] Other (See Comments)    Adhesive Tape Rash     Some of them       Social History     Socioeconomic History    Marital status:      Spouse name: Not on file    Number of children: Not on file    Years of education: Not on file    Highest education level: Not on file   Occupational History    Not on file   Tobacco Use    Smoking status: Never Smoker    Smokeless tobacco: Never Used   Vaping Use    Vaping Use: Never used   Substance and Sexual Activity    Alcohol use:  Yes     Alcohol/week: 0.0 standard drinks     Comment: one glass every week or two    Drug use: No    Sexual activity: Yes   Other Topics Concern    Not on file   Social History Narrative    Not on file     Social Determinants of Health     Financial Resource Strain: Low Risk     Difficulty of Paying Living Expenses: Not very hard   Food Insecurity: No Food Insecurity    Worried About Running Out of Food in the Last Year: Never true    Ran Out of Food in the Last Year: Never true   Transportation Needs:     Lack of Transportation (Medical): Not on file    Lack of Transportation (Non-Medical):  Not on file   Physical Activity:     Days of Exercise per Week: Not on file    Minutes of Exercise per Session: Not on file   Stress:     Feeling of Stress : Not on file   Social Connections:     Frequency of Communication with Friends and Family: Not on file    Frequency of Social Gatherings with Friends and Family: Not on file    Attends Scientologist Services: Not on file    Active Member of [address] [place] [address] [employer] or Organizations: Not on file    Attends Club or Organization Meetings: Not on file    Marital Status: Not on file   Intimate Partner Violence:     Fear of Current or Ex-Partner: Not on file    Emotionally Abused: Not on file    Physically Abused: Not on file    Sexually Abused: Not on file   Housing Stability:     Unable to Pay for Housing in the Last Year: Not on file    Number of [place] in the Last Year: Not on file    Unstable Housing in the Last Year: Not on file       Family History   Problem Relation Age of Onset    Arthritis Mother     High Blood Pressure Mother     Heart Disease Father     Stroke Father     Diabetes Sister     Ovarian Cancer Paternal Grandmother         unknown age       Review of Systems     See HPI    /64 (Site: Right Upper Arm)   Pulse 80   Temp 97.2 °F (36.2 °C)   Wt 193 lb (87.5 kg)   LMP 11/04/2015   BMI 32.12 kg/m²     Physical Examination: General appearance - alert, well appearing, and in no distress and overweight  Mental status - alert, oriented to person, place, and time  Neck - supple, no significant adenopathy  Chest - clear to auscultation, no wheezes, rales or rhonchi, symmetric air entry  Heart - normal rate, regular rhythm, normal S1, S2, no murmurs, rubs, clicks or gallops  Abdomen - soft, nontender, nondistended, no masses or organomegaly  Neurological - alert, oriented, normal speech, no focal findings or movement disorder noted, Babinski sign negative  Musculoskeletal - no joint tenderness, deformity or swelling  Extremities - peripheral pulses normal, no pedal edema, no clubbing or cyanosis, Carina's sign negative bilaterally  Skin - normal coloration and turgor, no rashes, no suspicious skin lesions noted     I have reviewed recent diagnostic testing including labs and EKG. Diagnosis Orders   1. Type 2 diabetes mellitus without complication, without long-term current use of insulin (HCC)  POCT glycosylated hemoglobin (Hb A1C)   2. Other specified hypothyroidism  TSH with Reflex    T4, Free     DIABETIC FOOT EXAM    Done in nov 2021       Orders Placed This Encounter   Procedures    TSH with Reflex     Standing Status:   Future     Standing Expiration Date:   7/23/2022    T4, Free     Standing Status:   Future     Standing Expiration Date:   7/23/2022    POCT glycosylated hemoglobin (Hb A1C)     ALL THE LABS FROM 1/13/22 NOTED    PLAN:    DM-2  Well controlled on the current regimen, on trulicity 1.5 mg per week and on glucophag 500 mg BID , no active GI issues. recent labs noted. Dr. Salas Ruiz at [facility] f/u with  CAD. Based on the current abel and A1c readings as outlined above ,  Pt was given education material   Re DM-2 . Due to low sugars and A1c of 6.3 , cut down the glucophage 2 tabs per day ( 1000 mg ) and cut down the invokona to 100 mg daily . She does see a retinal specialist twice a hear, no diabetic retinopathy. She is encouraged to follow up with our diabetic clinic in about 4 weeks   Strong dietary and  life style changes and regular exercise was recommended, and to lead an active life style . Diabetic education material was provided to the patient on this visit.  Pt was counseled extensively on both   Diet and exercise regimen today , along with healthy living with diabetes. RTO 4 months , CHECK LABS PRE VISIT      Signed by : Charles Hartmann M.D.     [address] [facility] Internal Medicine  [address] [place] [address], [address] [place] [address] Dr Niya Morales, [facility]    Tel  [phone]  Fax [phone]

## 2022-03-28 ENCOUNTER — TELEPHONE (OUTPATIENT)
Dept: INTERNAL MEDICINE CLINIC | Age: 62
End: 2022-03-28

## 2022-03-28 NOTE — TELEPHONE ENCOUNTER
Kanab called and sent over a fax regarding her Invokana 100 MG tablets is not covered. However, Charlyne Lanes are. What do you want to do with these? If we are changing the prescription, which one? Plus, what will be the strength, directions, quantity, and refills for it?

## 2022-03-30 NOTE — TELEPHONE ENCOUNTER
Farxiga 10 mg one daily  # 30 with 5 refills.    And order a BMP he needs to get this in 2-3 weeks after starting the new med    thanks

## 2022-03-31 DIAGNOSIS — I10 HYPERTENSION, UNSPECIFIED TYPE: ICD-10-CM

## 2022-03-31 DIAGNOSIS — E78.5 HYPERLIPIDEMIA, UNSPECIFIED HYPERLIPIDEMIA TYPE: ICD-10-CM

## 2022-03-31 DIAGNOSIS — E11.9 TYPE 2 DIABETES MELLITUS WITHOUT COMPLICATION, WITHOUT LONG-TERM CURRENT USE OF INSULIN (HCC): Primary | ICD-10-CM

## 2022-04-20 ENCOUNTER — OFFICE VISIT (OUTPATIENT)
Dept: INTERNAL MEDICINE CLINIC | Age: 62
End: 2022-04-20
Payer: COMMERCIAL

## 2022-04-20 VITALS
HEIGHT: 65 IN | HEART RATE: 86 BPM | SYSTOLIC BLOOD PRESSURE: 126 MMHG | WEIGHT: 196 LBS | BODY MASS INDEX: 32.65 KG/M2 | TEMPERATURE: 97.7 F | DIASTOLIC BLOOD PRESSURE: 70 MMHG

## 2022-04-20 DIAGNOSIS — E11.69 TYPE 2 DIABETES MELLITUS WITH OTHER SPECIFIED COMPLICATION, WITHOUT LONG-TERM CURRENT USE OF INSULIN (HCC): ICD-10-CM

## 2022-04-20 PROCEDURE — G0108 DIAB MANAGE TRN  PER INDIV: HCPCS | Performed by: INTERNAL MEDICINE

## 2022-04-20 RX ORDER — BUPROPION HYDROCHLORIDE 300 MG/1
300 TABLET ORAL DAILY
COMMUNITY
Start: 2022-03-23

## 2022-04-20 NOTE — PATIENT INSTRUCTIONS
1/4 cup dark chocolate chips       --try about 1/2 early in the the and the other 1/2 around dinner      OR eat only 1 or 2 at a time spaced throughout the day  Meal plan: 30-45 grams of carbohydrate at each meal                   Snack 15-20 grams for a single snack  Keep drinking at least 6-8 glasses of water per day  Keep going with your exercise efforts--great job! Keep checking your blood sugar every 8 hours or more!          Goal for blood sugar waking up               2hours after eating --under 160-180

## 2022-04-20 NOTE — PROGRESS NOTES
Diabetes Mellitus Type II, Initial Visit:   Dr. Arielle Morocho  Patient here for an initial evaluation of Type 2 diabetes mellitus. Current symptoms/problems include none. The patient was initially diagnosed with Type 2 diabetes mellitus:  2012. Known diabetic complications: peripheral neuropathy, cardiovascular disease and cerebrovascular disease  Cardiovascular risk factors: diabetes mellitus, dyslipidemia, hypertension, obesity (BMI >= 30 kg/m2) and sedentary lifestyle  Current diabetic medications include Trulicity, Metfromin, [place]. Eye exam current (within one year): yes; cataracts; fam hx glaucoma; no retinopathy     Retinol specialist- blood cluster Dr. Micki Du 1/2022  Weight trend: increasing steadily in the past few months 5-10  pounds  Prior visit with dietician: yes - remote  Current diet: B 6am-11am cereal (bran/ cheerios)                       L skips if having late bkfst                          Crackers/ cheese                            Burrito                            Salad with nuts/ berries                       D hamburger on bun                       1/2 caf coffee in AM; hot tea; water  6-8 cups                         Rare diet pop  Current exercise: 6am stretch with PBS program 20mins                            PT 2 days per week; exercises daily  Current monitoring regimen: home blood tests - 4+ times daily; [place] CGM  Home blood sugar records: fasting range: 109-145. Lunch 120-146. Dinner 239-235. BT 1143-158  Any episodes of hypoglycemia? No.Was having BS's 70's without s/s  Immunizations:  2021  Is She on ACE inhibitor or angiotensin II receptor blocker? Yes   valsartan (Diovan)    Focus  Initial visit for Diabetes education. A1C from 3/23/22 6.3%. Carmen Gallo reports that Metformin was reduced at her last visit r/t lower BS readings.  Since then she has not done as well with meal plannning/ she has gained 5-10 pounds and her glucose levels are running in goal 89%; but readings are not as good as they had been. With minimal risk of hypoglycemia with Trulicity/ Metformin/ [place]; the tighter glucose levels are an advantage to [place]. Discussed getting her meal plan back in place. She is exercising daily, but also encourage getting back to the [employer] for swimming as well. Much encouragement given. Vicki agreed to the plan below and will follow up with the dietician in 3 weeks. Plan  Reviewed BG goals; carbs; exercise; weight loss; medication actions  1/4 cup dark chocolate chips       --try about 1/2 early in the the and the other 1/2 around dinner      OR eat only 1 or 2 at a time spaced throughout the day  Meal plan: 30-45 grams of carbohydrate at each meal                   Snack 15-20 grams for a single snack  Keep drinking at least 6-8 glasses of water per day  Keep going with your exercise efforts--great job! Keep checking your blood sugar every 8 hours or more! Goal for blood sugar waking up               2hours after eating --under 160-180  Meter download, medications, PMH and nursing assessment reviewed. Bev Ruthie states She is willing to participate in this plan of care and verbalized understanding of all instructions provided. Teach back used to verify comprehension. Total time involved in direct patient education: 60 minutes.

## 2022-05-05 LAB
ANION GAP SERPL CALCULATED.3IONS-SCNC: 6 MMOL/L (ref 4–12)
BUN BLDV-MCNC: 14 MG/DL (ref 7–20)
CALCIUM SERPL-MCNC: 9.6 MG/DL (ref 8.8–10.5)
CHLORIDE BLD-SCNC: 103 MEQ/L (ref 101–111)
CO2: 31 MEQ/L (ref 21–32)
CREAT SERPL-MCNC: 0.72 MG/DL (ref 0.6–1.3)
CREATININE CLEARANCE: >60
GLUCOSE: 120 MG/DL (ref 70–110)
POTASSIUM SERPL-SCNC: 4.5 MEQ/L (ref 3.6–5)
SODIUM BLD-SCNC: 140 MEQ/L (ref 135–145)

## 2022-05-18 ENCOUNTER — OFFICE VISIT (OUTPATIENT)
Dept: INTERNAL MEDICINE CLINIC | Age: 62
End: 2022-05-18
Payer: COMMERCIAL

## 2022-05-18 VITALS — BODY MASS INDEX: 32.59 KG/M2 | WEIGHT: 195.6 LBS | TEMPERATURE: 98.2 F | HEIGHT: 65 IN

## 2022-05-18 DIAGNOSIS — E11.9 TYPE 2 DIABETES MELLITUS WITHOUT COMPLICATION, UNSPECIFIED WHETHER LONG TERM INSULIN USE (HCC): ICD-10-CM

## 2022-05-18 PROCEDURE — 97802 MEDICAL NUTRITION INDIV IN: CPT | Performed by: DIETITIAN, REGISTERED

## 2022-05-18 NOTE — PROGRESS NOTES
[address] [place] [address]. [address] [place] [address] Nate., [facility], [address]  [phone] (phone)  [phone] (fax)    Patient Name: Guilherme Child. Date of Birth: [id number]. MRN: [id number]      Assessment: Patient is a 57 y.o. female seen for Initial MNT visit for Type 2 DB.     -Nutritionally relevant labs:   Lab Results   Component Value Date/Time    LABA1C 6.3 (H) 03/23/2022 08:29 AM    LABA1C 6.0 (H) 11/17/2021 08:12 AM    LABA1C 5.8 01/30/2020 04:02 AM    LABA1C 6.5 (H) 01/22/2014 10:23 AM    LABA1C 6.3 02/06/2012 09:39 AM    GLUCOSE 120 (H) 05/05/2022 09:52 AM    GLUCOSE 131 (H) 01/13/2022 08:42 AM    CHOL 148 01/13/2022 08:42 AM    CHOL 111 01/30/2020 04:02 AM    HDL 52 01/13/2022 08:42 AM    LDLCALC 42 01/30/2020 04:02 AM    TRIG 157 (H) 01/13/2022 08:42 AM     -Blood sugar trends: Librelink connected with her phone. Downloaded report and plan to scan in EHR. TIR:  91%, High:  9%, Very High 0%    -Food recall:   Does not measure her foods. Some days eating brkf ~ 630 am.  On mornings not getting up until late Will eat ~1030 - 11 am and will skip lunch. Breakfast:  (Fiber one 1/4 to 1/3 cup, 1 cup cheerios, unsw almond milk, 1/2 banana, 16 oz coffee with sf creamer and stevia OR 2 eggs, 2 biscuits, 1 tsp rasp preserves. OR Equate protein shake, (30 gms protein)  Lunch: may skip OR BLT (Lock 16), salad, dark chocolate chips OR Strawberry salad Luis Gallo), 2 c coffee with 2 cream and splenda OR ~ 2-3 oz sausage link, 2/3 - 1 cup mac and cheese and protein bar. Evangelical meal - spaghetti casserole, salad with [place] dressing, cake, pudding cup, unsw tea, water and 4 dove chocolates. Dinner: Pulled pork sandwich OR 1 sl leftover [employer] Pizza OR Leftover Ravioli (2 cheese ravioli from [employer]), 1 c green beans and louisa deonte chips OR Erminio Shape - banana bread, 1/2 banana, 1 TB butter, 2 marshmellows.    Snacks: 1 sl leftover Godfather's pizza OR 1/2 cup strawberry rhubarb cobbler, 1 scoop ice cream OR Cashews OR dark deonte chips  Likes Chocolate and has edy dark deonte assortment. SF deonte - then stomach cramps    Eating out - has been eating out a lot lately. -Main Beverages: coffee in the mornings and maybe again in the afternoon?, fruit infused water and heats this up with hot water. Exercise:  Started exercise in March (~ 2 mo). Trying to get up at 530 am to exercise with a morning show routine. On days she does not do this she will get up @ ~10 am.  Then takes her thyroid medication. She has been cutting back on exercising with her morning show d/t getting PT 2x/week and does the PT exercises on her own 4-6x/week. Pt dealing with sciatic pain. Has been away from home the past 2 weekends so was not doing exercises on those days. -Impression of Dietary Intake: on average, 2-3 meals per day, on average, frequent dining out and fast food meals per week, high fat/ cholesterol, likes to snack kasey on chocolate. Current Outpatient Medications on File Prior to Visit   Medication Sig Dispense Refill    buPROPion (WELLBUTRIN XL) 300 MG extended release tablet Take 300 mg by mouth daily      dapagliflozin (FARXIGA) 10 MG tablet Take 1 tablet by mouth every morning 30 tablet 5    levothyroxine (SYNTHROID) 112 MCG tablet Take 1 tablet by mouth daily 90 tablet 3    Continuous Blood Gluc Sensor (FREESTYLE MARLO 14 DAY SENSOR) MISC Apply 1 sensor every 14 days. 6 each 2    Dulaglutide (TRULICITY) 1.5 WO/8.4WZ SOPN Inject 1.5 MG once weekly.  6 mL 3    Lancets MISC 1 each by Does not apply route daily 100 each 3    metFORMIN (GLUCOPHAGE) 500 MG tablet Take 1 tablet by mouth 2 times daily (with meals) New dose 180 tablet 1    esomeprazole (NEXIUM) 40 MG delayed release capsule       REPATHA SURECLICK 266 MG/ML SOAJ       Cranberry 1000 MG CAPS Take by mouth      levocetirizine (XYZAL) 5 MG tablet Take 5 mg by mouth nightly      fluticasone (FLONASE) 50 MCG/ACT nasal spray 2 sprays by Each Nostril route daily 3 each 2    carbidopa-levodopa (SINEMET)  MG per tablet TAKE 1 TABLET NIGHTLY 90 tablet 3    CPAP Machine MISC by Does not apply route Please change CPAP pressure to auto 7-13 cm H20. Download in 2 weeks 1 each 0    clopidogrel (PLAVIX) 75 MG tablet Take 75 mg by mouth daily      aspirin 81 MG tablet Take 81 mg by mouth daily      potassium chloride (KLOR-CON 10) 10 MEQ extended release tablet Take 10 mEq by mouth daily At nighttime      isosorbide mononitrate (IMDUR) 30 MG extended release tablet Take 15 mg by mouth daily Take in the morning (1/2 tablet)      cycloSPORINE (RESTASIS) 0.05 % ophthalmic emulsion 1 drop 2 times daily      valsartan (DIOVAN) 80 MG tablet Take 80 mg by mouth daily      Respiratory Therapy Supplies (ADULT MASK) MISC Please do mask refit and provide mask of patient's choice. She is interested in Full face mask to minimize leaks. I recommend Ruchi view mask. 1 each 0    butalbital-acetaminophen-caffeine (FIORICET, ESGIC) -40 MG per tablet Take 1 tablet by mouth every 4 hours as needed for Headaches      Probiotic Product (PROBIOTIC DAILY PO) Take by mouth      artificial tears (ARTIFICIAL TEARS) OINT 4 times daily Gen Teal      gabapentin (NEURONTIN) 300 MG capsule Take 300 mg by mouth 4 times daily. 2 tablet in the morning, 1 at lunch, 1 at dinner, 2 at nighttime      atenolol (TENORMIN) 50 MG tablet Take 50 mg by mouth daily.  Multiple Vitamin (MULTI-VITAMIN PO) Take  by mouth daily.  B Complex Vitamins (VITAMIN B COMPLEX PO) Take  by mouth daily.  Calcium Carbonate-Vitamin D (CALCIUM 600 + D) 600-400 MG-UNIT TABS Take 1 tablet by mouth daily.  Flaxseed, Linseed, (FLAXSEED OIL) 1000 MG CAPS Take  by mouth daily. No current facility-administered medications on file prior to visit.         Vitals from current and previous visits:  Temp 98.2 °F (36.8 °C)   Ht 5' 5\" (1.651 m) Wt 195 lb 9.6 oz (88.7 kg)   LMP 11/04/2015   BMI 32.55 kg/m²     -Body mass index is 32.55 kg/m². 30-34.9 - Obesity Grade I.   -Weight goal: lose weight. Nutrition Diagnosis:   Food and nutrition-related knowledge deficit related to remote previous MNT/currently undergoing MNT as evidenced by Conditions associated with a diagnosis or treatment: Type 2 DB and BMI 32. Intervention:  -Impression: pt wanting to lose weight and would like to get off some of her DB medications.    -Instructed the patient on: Carbohydrate Counting, Consistent Carbohydrate Intake, Food Label Reading, Meal Planning for Regular, Balanced Meals & Snacks and The Importance of Regular Physical Activity, following low fat guidelines to help with weight loss efforts.    -Handouts given for: carbohydrate counting, healthy snacks, 150 sample meal plans/menus and fancy plate pics, website list.    Patient Instructions   1.)  Get familiar with reading the nutrition facts label by looking at serving size and total carbohydrates. - Without a label refer to your carb count guide booklet. 2.)  Measure foods for accuracy in carb counting.    3.)  Healthy carb choices:  whole grains, whole wheat pasta, starchy vegetables, fresh fruit and lowfat/non-fat milk and yogurt. 4.)  Your meal plan is found on the inside cover of your carb counting guide booklet:  1st meal or Brkf:  45 gms carbohydrates + 1-2 oz protein  2nd meal or Lunch: 30 - 45 gms carbohydrates + 2-3 oz protein + non-starchy veggies  3rd meal or Dinner:  45 gms carbohydrates + 2-3 oz protein + non- starchy veggies    Snack time:  15 - 20 gms carbohydrates + 1 oz protein    5.)  Choose lean protein most of the time and Cut in 1/2 added fats to help with weight loss efforts.     6.) Bring a 1-2 week food log to next dietitian appt - include portions and if know include gms of carbs, Can do the JJ PHARMA marlee on your phone and bring your password then dietitian can print the logs. Thanks. Check BS:  At various times of the day  Fasting BS (1st thing in the morning) or before a meal (at least 4 hour since last ate), BS goal:  90 - 130  2 hours after the start of a meal, BS goal:  100 - 150    7.)  Great job keeping active with your exercise.    -Nutrition prescription: 1400 - 1500 calories/day, 135 - 150 g carbs/day. Adj wt. 143# (65 kg)    Comprehension verified using teachback method. Monitoring/Evaluation:   -Followup visit: 7 weeks with dietitian.   -Receptiveness to education/goals: Agreeable.  -Evaluation of education: Indicates understanding.  -Readiness to change: contemplation - ambivalent about change measuring foods and carb counting.  -Expected compliance: good. Thank you for your referral of this patient. Total time involved in direct patient education: 60 minutes for initial MNT visit.

## 2022-05-18 NOTE — PATIENT INSTRUCTIONS
1. )  Get familiar with reading the nutrition facts label by looking at serving size and total carbohydrates. - Without a label refer to your carb count guide booklet. 2.)  Measure foods for accuracy in carb counting.    3.)  Healthy carb choices:  whole grains, whole wheat pasta, starchy vegetables, fresh fruit and lowfat/non-fat milk and yogurt. 4.)  Your meal plan is found on the inside cover of your carb counting guide booklet:  1st meal or Brkf:  45 gms carbohydrates + 1-2 oz protein  2nd meal or Lunch: 30 - 45 gms carbohydrates + 2-3 oz protein + non-starchy veggies  3rd meal or Dinner:  45 gms carbohydrates + 2-3 oz protein + non- starchy veggies    Snack time:  15 - 20 gms carbohydrates + 1 oz protein    5.)  Choose lean protein most of the time and Cut in 1/2 added fats to help with weight loss efforts. 6.) Bring a 1-2 week food log to next dietitian appt - include portions and if know include gms of carbs, Can do the [place] marlee on your phone and bring your password then dietitian can print the logs. Thanks. Check BS:  At various times of the day  Fasting BS (1st thing in the morning) or before a meal (at least 4 hour since last ate), BS goal:  90 - 130  2 hours after the start of a meal, BS goal:  100 - 150    7.)  Great job keeping active with your exercise.

## 2022-06-01 ENCOUNTER — PHARMACY VISIT (OUTPATIENT)
Dept: INTERNAL MEDICINE CLINIC | Age: 62
End: 2022-06-01
Payer: COMMERCIAL

## 2022-06-01 DIAGNOSIS — E11.9 TYPE 2 DIABETES MELLITUS WITHOUT COMPLICATION, UNSPECIFIED WHETHER LONG TERM INSULIN USE (HCC): Primary | ICD-10-CM

## 2022-06-01 PROCEDURE — G0108 DIAB MANAGE TRN  PER INDIV: HCPCS | Performed by: INTERNAL MEDICINE

## 2022-06-01 NOTE — PROGRESS NOTES
[facility]  [address] [address] [address], [facility], [facility]  [phone] (phone)  [phone] (fax)    Patient ID: Arabella Carmona [id number]  Referring Provider: Dr. Omar Sutton     Patient's name and  were verified. Subjective:    She presents for Her follow-up diabetic visit. She has type 2 diabetes mellitus. Home regimen includes: Biguanide, GLP-1 Agonist, and SGLT-2 inhibitors She is compliant most of the time. Assessment:     Lab Results   Component Value Date    LABA1C 6.3 2022    LABA1C 5.8 2020    BUN 14 2022    CREATININE 0.72 2022     There were no vitals filed for this visit. Wt Readings from Last 3 Encounters:   22 195 lb 9.6 oz (88.7 kg)   22 196 lb (88.9 kg)   22 193 lb (87.5 kg)     Ht Readings from Last 3 Encounters:   22 5' 5\" (1.651 m)   22 5' 5\" (1.651 m)   21 5' 5\" (1.651 m)       Diabetes Pharmacotherapy:  Farxiga 23MB daily  Trulicity 1.4VT weekly  Metfromin 500mg BID    Glucose Trends:   Current monitoring regimen: Freestyle Topher 14 Day CGM - checks 4+ times daily  Home blood sugar trends:    -V.  High: 0%, High: 7%, Target: 93%, Low: 0%, V. Low: 0%  Any episodes of hypoglycemia? no   -Treats with handful of chocolate or glucose tablets    Lifestyle Factors:   Previous visit with dietician: yes - 22  Current diet: B: coffee w/ Stevia, cereal or oatmeal            L: snack - couple cheesesticks OR yogurt OR handful chocolate OR leftovers OR frozen burrito                       D: baked bean or shredded chicken sand, hamburger w/ bun                       Snacks: handful of dark chocolate chips                       Beverages: mostly water  Current exercise:  20-25 minute PBS program 6 days per week OR PT exercises twice daily    Health Maintenance:  Eye exam current (within one year): yes Date: 2022, Dr. Lyndsey Moser  Any history of foot problems? no  Foot exam up to date: yes Date: 2021  The 10-year ASCVD risk score (Jay Lewis et al., 2013) is: 10.6%  Last panel - LDL:   Lab Results   Component Value Date    LDLCALC 42 01/30/2020    LDLDIRECT 86 01/13/2022   Taking ASA:  Yes   Taking statin: No- not tolerated  Last microalbumin/creatinine ratio: No results found for: MCACR, MALBCR, MALBCRRATEXT   Taking ACE/ARB: Yes-valsartan    Focus:   Diabetes Education. Last A1C: 6.3 (3/23/22)%. Time in range is at goal at 93%. Overall, Kimberly Lawrence is on track with her medication regimen, diet, and exercise. Kimberly Lawrence denies any ADRs with medications, other than periodic mild diarhea; discussed consideration of  metformin ER for her next fill. Reviewed Vicki's exercise regimen, which is meeting targets. Discussed ways to reduce snacking. Ordered urine microalbumin to obtain with next set of labs. Curriculum Area Focus: Diabetes complications, Carbohydrate counting/meal planning, and Physical activity goals  DSME PLAN:     1. Let us know if you like the extended release metformin  - take 500mg twice daily or 1000mg once daily    2. Try to keep your chocolate out of sight - out of sight, out of mind    3. Be sure to get your TSH and free T4 as well as your urine protein test done before your next Dr. Jeanne Gibson appointment      Meter download, medications, PMH and nursing assessment reviewed. Bula Other states She is willing to participate in this plan of care and verbalized understanding of all instructions provided. Teach back used to verify comprehension. Follow-up: 1 month Dr. Erika Rayo, 3 month DM Clinic RN    Total time involved in direct patient education: 60 minutes.      For [facility] [employer] [place] in place:  Yes  Recommendation Provided To: Patient/Caregiver: 1 via In person  Intervention Detail: Lab(s) Ordered  Gap Closed?: No   Intervention Accepted By: Patient/Caregiver: 1  Time Spent (min): [address], PharmD, BCPS  Internal Medicine Clinical Pharmacist  [phone]

## 2022-06-24 LAB
CREATININE, RANDOM URINE: 68.8 MG/DL
MICROALBUMIN UR-MCNC: < 7 MG/L
MICROALBUMIN/CREAT UR-RTO: < 10.2 MG/GM (ref 0–30)
TSH REFLEX: 1.26 MCIU/ML (ref 0.49–4.67)

## 2022-07-19 ENCOUNTER — OFFICE VISIT (OUTPATIENT)
Dept: INTERNAL MEDICINE CLINIC | Age: 62
End: 2022-07-19
Payer: COMMERCIAL

## 2022-07-19 VITALS — WEIGHT: 189 LBS | BODY MASS INDEX: 31.49 KG/M2 | HEIGHT: 65 IN | TEMPERATURE: 98.4 F

## 2022-07-19 DIAGNOSIS — E11.9 TYPE 2 DIABETES MELLITUS WITHOUT COMPLICATION, UNSPECIFIED WHETHER LONG TERM INSULIN USE (HCC): Primary | ICD-10-CM

## 2022-07-19 PROCEDURE — 97803 MED NUTRITION INDIV SUBSEQ: CPT | Performed by: DIETITIAN, REGISTERED

## 2022-07-19 NOTE — PROGRESS NOTES
[address] [place] [address]. [address] [place] [address] Nate., [facility], [address]  [phone] (phone)  [phone] (fax)    Patient Name: Guicho Leiva Date of Birth: [id number]. MRN: [id number]      Assessment: Patient is a 57 y.o. female seen for follow-up MNT visit for Type 2 DB.     -Nutritionally relevant labs:   Lab Results   Component Value Date/Time    LABA1C 6.3 (H) 03/23/2022 08:29 AM    LABA1C 6.0 (H) 11/17/2021 08:12 AM    LABA1C 5.8 01/30/2020 04:02 AM    LABA1C 6.5 (H) 01/22/2014 10:23 AM    LABA1C 6.3 02/06/2012 09:39 AM    GLUCOSE 120 (H) 05/05/2022 09:52 AM    GLUCOSE 131 (H) 01/13/2022 08:42 AM    CHOL 148 01/13/2022 08:42 AM    CHOL 111 01/30/2020 04:02 AM    HDL 52 01/13/2022 08:42 AM    LDLCALC 42 01/30/2020 04:02 AM    TRIG 157 (H) 01/13/2022 08:42 AM     DB meds:    Finishing Invokana before going to farxiga  Metformin 663 mg BID  Trulicity once weekly 1.5 mg.    -Blood sugar trends: Topher CGM carelink report printed. TIR:  95%, high 5%    -Food recall/food logging using [employer]:   Does not measure her foods. Some days eating brkf ~ 630 am.  On mornings not getting up until late Will eat ~1030 - 11 am and will skip lunch. Breakfast:  (Fiber one 1/4 to 1/3 cup, 1 cup cheerios, unsw almond milk, 1/2 banana, 16 oz coffee with sf creamer and stevia OR 2 eggs, 2 biscuits, 1 tsp rasp preserves. OR Equate protein shake, (30 gms protein)  Lunch: Light & Fit van greek yogurt, 4 pieces of natural string cheese (20 gms fat), 1 raspberry fig bar OR 5 Ritz crackers, 4 oz cream cheese (28 gm fat), 1 TB cashews, 2 pieces natural string cheese  Dinner: BBQ chicken thigh, BBQ chicken leg, brocc & cauliflower with butter OR Chicken breast, 10 pieces of artichoke hearts, vanilla mini ice cream sandwich, 1/2 cup unsw almond milk OR Chicken and broccoli [place], green beans.    Snacks: 5 ritz crackers, 4 oz cream cheese OR 1 rice cake caramel, 1 cup blueberries, 1/2 large banana OR [employer] Michaeli, (1/4 of a meal), 1/2 cup 2% milk, 1 sl whole wheat bread, 3 tsp butter    Was sick x 2 weeks prior to today's visit and then was at [facility] for a week. When at the camp she got in more steps - 8-9000 steps/day; when normal step amounts is 3-4000 steps/day    -Main Beverages: coffee, water and fruit infused bosch.    -Impression of Dietary Intake: on average, 2-3 meals per day, lacking routine intake of fruits and vegetables, frequent snacking, refined processed carb choices    Current Outpatient Medications on File Prior to Visit   Medication Sig Dispense Refill    Calcium Polycarbophil (FIBERCON PO) Take by mouth daily      metFORMIN (GLUCOPHAGE) 500 MG tablet Take 1 tablet by mouth 2 times daily (with meals) New dose 180 tablet 1    buPROPion (WELLBUTRIN XL) 300 MG extended release tablet Take 300 mg by mouth daily      dapagliflozin (FARXIGA) 10 MG tablet Take 1 tablet by mouth every morning 30 tablet 5    Continuous Blood Gluc Sensor (SilverBack TechnologiesSTYLE MARLO 14 DAY SENSOR) MISC Apply 1 sensor every 14 days. 6 each 2    Dulaglutide (TRULICITY) 1.5 GI/7.6YE SOPN Inject 1.5 MG once weekly. 6 mL 3    Lancets MISC 1 each by Does not apply route daily 100 each 3    esomeprazole (NEXIUM) 40 MG delayed release capsule Take 40 mg by mouth every morning (before breakfast)       REPATHA SURECLICK 671 MG/ML SOAJ       Cranberry 1000 MG CAPS Take by mouth      levocetirizine (XYZAL) 5 MG tablet Take 5 mg by mouth nightly      fluticasone (FLONASE) 50 MCG/ACT nasal spray 2 sprays by Each Nostril route daily 3 each 2    carbidopa-levodopa (SINEMET)  MG per tablet TAKE 1 TABLET NIGHTLY 90 tablet 3    CPAP Machine MISC by Does not apply route Please change CPAP pressure to auto 7-13 cm H20.   Download in 2 weeks 1 each 0    clopidogrel (PLAVIX) 75 MG tablet Take 75 mg by mouth daily      aspirin 81 MG tablet Take 81 mg by mouth daily      potassium chloride (KLOR-CON) 10 MEQ extended release tablet Take 10 mEq by mouth daily At nighttime      isosorbide mononitrate (IMDUR) 30 MG extended release tablet Take 15 mg by mouth daily Take in the morning (1/2 tablet)      cycloSPORINE (RESTASIS) 0.05 % ophthalmic emulsion 1 drop 2 times daily      valsartan (DIOVAN) 80 MG tablet Take 80 mg by mouth daily      Respiratory Therapy Supplies (ADULT MASK) MISC Please do mask refit and provide mask of patient's choice. She is interested in Full face mask to minimize leaks. I recommend Ruchi view mask. 1 each 0    butalbital-acetaminophen-caffeine (FIORICET, ESGIC) -40 MG per tablet Take 1 tablet by mouth every 4 hours as needed for Headaches      Probiotic Product (PROBIOTIC DAILY PO) Take by mouth      artificial tears (ARTIFICIAL TEARS) OINT 4 times daily Gen Teal      gabapentin (NEURONTIN) 300 MG capsule Take 300 mg by mouth 4 times daily. 2 tablet in the morning, 1 at lunch, 1 at dinner, 2 at nighttime      atenolol (TENORMIN) 50 MG tablet Take 50 mg by mouth daily. Multiple Vitamin (MULTI-VITAMIN PO) Take  by mouth daily. B Complex Vitamins (VITAMIN B COMPLEX PO) Take  by mouth daily. calcium carbonate-vitamin D (CALTRATE) 600-400 MG-UNIT TABS per tab Take 1 tablet by mouth daily. Flaxseed, Linseed, (FLAXSEED OIL) 1000 MG CAPS Take  by mouth daily. levothyroxine (SYNTHROID) 112 MCG tablet Take 1 tablet by mouth daily 90 tablet 3     No current facility-administered medications on file prior to visit. Vitals from current and previous visits:  Temp 98.4 °F (36.9 °C)   Ht 5' 5\" (1.651 m)   Wt 189 lb (85.7 kg)   LMP 11/04/2015   BMI 31.45 kg/m²     -Body mass index is 31.45 kg/m². 30-34.9 - Obesity Grade I.   - Patient lost and ~7 pounds over the last 2 mo. .  -Weight goal: lose weight.      Nutrition Diagnosis:   Food and nutrition-related knowledge deficit related to currently undergoing MNT as evidenced by Conditions associated with a diagnosis or treatment: Type 2 DB.         Intervention:  -Instructed the patient on: Carbohydrate Counting, Consistent Carbohydrate Intake, Meal Planning for Regular, Balanced Meals & Snacks, and The Importance of Regular Physical Activity, low fat guidelines    -Handouts given for: Topher CGM report, food logging report. Patient Instructions   Add protein with your brkf in the morning. Try to eat carbohydrates consistently through the day. 45 gms carbs/meal  - add protein  - add non-starchy veggies    Your snack - keep no more than 15-20 gms carbohdydrates    Good job adding fiber to your brkf cereal.    Keep total fat grams for the day - < 50-60 gms of fat/day. Work up those steps again like you did at camp. Aim for 64 ounces of fluid/day - caffeine free, sugar free. Make most of your fluid ounces be water or fruit infused water. Good job logging on [place].    -Nutrition prescription: 1400 - 1500 calories/day, 120 - 150 g carbs/day. <50-60 gms fat/day    Comprehension verified using teachback method. Monitoring/Evaluation:   -Followup visit: 4.5 mo with dietitian - per pt choice of availability   -Receptiveness to education/goals: Agreeable.  -Evaluation of education: Indicates understanding.  -Readiness to change: precontemplative- reducing frequency of refined carb food choices and contemplation - ambivalent about change consistent carb intake and reducing fat intake. -Expected compliance: good. Thank you for your referral of this patient. Total time involved in direct patient education: 60 minutes for follow-up MNT visit.

## 2022-07-19 NOTE — PATIENT INSTRUCTIONS
Add protein with your brkf in the morning. Try to eat carbohydrates consistently through the day. 45 gms carbs/meal  - add protein  - add non-starchy veggies    Your snack - keep no more than 15-20 gms carbohdydrates    Good job adding fiber to your brkf cereal.    Keep total fat grams for the day - < 50-60 gms of fat/day. Work up those steps again like you did at [employer]. Aim for 64 ounces of fluid/day - caffeine free, sugar free. Make most of your fluid ounces be water or fruit infused water.      Good job logging on [place].

## 2022-07-28 ENCOUNTER — OFFICE VISIT (OUTPATIENT)
Dept: INTERNAL MEDICINE CLINIC | Age: 62
End: 2022-07-28
Payer: COMMERCIAL

## 2022-07-28 VITALS
OXYGEN SATURATION: 96 % | DIASTOLIC BLOOD PRESSURE: 72 MMHG | HEART RATE: 82 BPM | TEMPERATURE: 97.2 F | SYSTOLIC BLOOD PRESSURE: 128 MMHG | BODY MASS INDEX: 31.12 KG/M2 | WEIGHT: 187 LBS

## 2022-07-28 DIAGNOSIS — E78.00 PURE HYPERCHOLESTEROLEMIA: ICD-10-CM

## 2022-07-28 DIAGNOSIS — E11.9 TYPE 2 DIABETES MELLITUS WITHOUT COMPLICATION, UNSPECIFIED WHETHER LONG TERM INSULIN USE (HCC): Primary | ICD-10-CM

## 2022-07-28 LAB — HBA1C MFR BLD: 6 % (ref 4.3–5.7)

## 2022-07-28 PROCEDURE — 99213 OFFICE O/P EST LOW 20 MIN: CPT | Performed by: INTERNAL MEDICINE

## 2022-07-28 PROCEDURE — 3044F HG A1C LEVEL LT 7.0%: CPT | Performed by: INTERNAL MEDICINE

## 2022-07-28 PROCEDURE — 83036 HEMOGLOBIN GLYCOSYLATED A1C: CPT | Performed by: INTERNAL MEDICINE

## 2022-07-28 RX ORDER — DULAGLUTIDE 1.5 MG/.5ML
INJECTION, SOLUTION SUBCUTANEOUS
Qty: 6 ML | Refills: 3 | Status: SHIPPED | OUTPATIENT
Start: 2022-07-28 | End: 2022-10-20 | Stop reason: SDUPTHER

## 2022-07-28 RX ORDER — POTASSIUM CHLORIDE 750 MG/1
TABLET, EXTENDED RELEASE ORAL
COMMUNITY
Start: 2022-05-30 | End: 2022-07-28

## 2022-07-28 RX ORDER — GUAIFENESIN 600 MG/1
TABLET, EXTENDED RELEASE ORAL
COMMUNITY
Start: 2018-12-07

## 2022-07-28 NOTE — PROGRESS NOTES
[address] [facility] Internal Medicine   [facility] [address] OFF[address]MA DEWITT.RICHIE, [address]  Dept: [phone]  Dept Fax: [phone]        YOB: 1960    Chief Complaint   Patient presents with    Diabetes    Hypertension       Patient presents for evaluation of DM-2   I have seen the patient once previously, since then seen our diabetic clinic team.  This patient is followed regularly by Dr. Manda Harada, from [facility]. Has a abel on we did an A1c today its 6% , previously  6.3 .  [place] was downloaded for the last 28days she is doing quite well over 97% of time is within target range of 70-1 80 no hypoglycemic symptoms average sugar is 125 . No cp or SOB, no low sugars and no LOC. No recent hospitalizations , had CAD with stents followed by   [facility] cardiology. We are following with her DM and HTN. Ailyn Melendez HAD URI symptoms for about 3 weeks, covid testing was negative. No fever or chills. Patient Active Problem List   Diagnosis    Hypothyroidism    Hyperlipemia    Metabolic syndrome    Menopause    Depression    Insulin resistance    Hypertension    Vitamin D deficiency    Restless leg syndrome    Daytime somnolence    Fatigue    Obesity (BMI 30-39. 9)    Obstructive sleep apnea on CPAP    Facial droop    Type 2 diabetes mellitus without complication (HCC)       Current Outpatient Medications   Medication Sig Dispense Refill    guaiFENesin (MUCINEX) 600 MG extended release tablet Take by mouth      Calcium Polycarbophil (FIBERCON PO) Take by mouth daily      metFORMIN (GLUCOPHAGE) 500 MG tablet Take 1 tablet by mouth 2 times daily (with meals) New dose 180 tablet 1    buPROPion (WELLBUTRIN XL) 300 MG extended release tablet Take 300 mg by mouth daily      dapagliflozin (FARXIGA) 10 MG tablet Take 1 tablet by mouth every morning 30 tablet 5    levothyroxine (SYNTHROID) 112 MCG tablet Take 1 tablet by mouth daily 90 tablet 3    Continuous Blood Gluc Sensor (FREESTYLE ABEL 14 DAY SENSOR) [facility] Apply 1 sensor every 14 days. 6 each 2    Dulaglutide (TRULICITY) 1.5 XN/6.8LI SOPN Inject 1.5 MG once weekly. 6 mL 3    Lancets MISC 1 each by Does not apply route daily 100 each 3    esomeprazole (NEXIUM) 40 MG delayed release capsule Take 40 mg by mouth every morning (before breakfast)       REPATHA SURECLICK 396 MG/ML SOAJ       Cranberry 1000 MG CAPS Take by mouth      levocetirizine (XYZAL) 5 MG tablet Take 5 mg by mouth nightly      fluticasone (FLONASE) 50 MCG/ACT nasal spray 2 sprays by Each Nostril route daily 3 each 2    carbidopa-levodopa (SINEMET)  MG per tablet TAKE 1 TABLET NIGHTLY 90 tablet 3    CPAP Machine MISC by Does not apply route Please change CPAP pressure to auto 7-13 cm H20. Download in 2 weeks 1 each 0    clopidogrel (PLAVIX) 75 MG tablet Take 75 mg by mouth daily      aspirin 81 MG tablet Take 81 mg by mouth daily      potassium chloride (KLOR-CON) 10 MEQ extended release tablet Take 10 mEq by mouth daily At nighttime      isosorbide mononitrate (IMDUR) 30 MG extended release tablet Take 15 mg by mouth daily Take in the morning (1/2 tablet)      cycloSPORINE (RESTASIS) 0.05 % ophthalmic emulsion 1 drop 2 times daily      valsartan (DIOVAN) 80 MG tablet Take 80 mg by mouth daily      Respiratory Therapy Supplies (ADULT MASK) MISC Please do mask refit and provide mask of patient's choice. She is interested in Full face mask to minimize leaks. I recommend Ruchi view mask. 1 each 0    butalbital-acetaminophen-caffeine (FIORICET, ESGIC) -40 MG per tablet Take 1 tablet by mouth every 4 hours as needed for Headaches      Probiotic Product (PROBIOTIC DAILY PO) Take by mouth      artificial tears (ARTIFICIAL TEARS) OINT 4 times daily Gen Teal      gabapentin (NEURONTIN) 300 MG capsule Take 300 mg by mouth 4 times daily. 2 tablet in the morning, 1 at lunch, 1 at dinner, 2 at nighttime      atenolol (TENORMIN) 50 MG tablet Take 50 mg by mouth daily.         Multiple Vitamin (MULTI-VITAMIN PO) Take  by mouth daily. B Complex Vitamins (VITAMIN B COMPLEX PO) Take  by mouth daily. calcium carbonate-vitamin D (CALTRATE) 600-400 MG-UNIT TABS per tab Take 1 tablet by mouth daily. Flaxseed, Linseed, (FLAXSEED OIL) 1000 MG CAPS Take  by mouth daily. No current facility-administered medications for this visit. Past Medical History:   Diagnosis Date    Anemia     Backache     Depression     Diabetes mellitus ([address]     Eczema     Fibromyalgia     Fracture     Hyperlipidemia     Hypertension     Hypothyroidism     Metabolic syndrome     Migraine     Sinusitis     Sleep apnea        Past Surgical History:   Procedure Laterality Date    ARM SURGERY      BONE GRAFT  2007    Dr. Michael Norton placement 11/2018    CARPAL TUNNEL RELEASE      RIGHT and LEFT    CHOLECYSTECTOMY  2003    Dr. John Allison ARTHROSCOPY      LEEP      OTHER SURGICAL HISTORY  2000    D&C Polyp Removal Dr. Anila Goodrich   Allergen Reactions    Phenylephrine      Other reaction(s): heart races    Aspirin Other (See Comments)     Hearing? Can take a baby aspirin. Fluconazole     Lipitor [Atorvastatin Calcium] Other (See Comments)     Memory loss [id number]    Mepergan [Meperidine-Promethazine]     Motrin [Ibuprofen Micronized]     Viibryd [Vilazodone Hcl] Other (See Comments)    Adhesive Tape Rash     Some of them       Social History     Socioeconomic History    Marital status:      Spouse name: Not on file    Number of children: Not on file    Years of education: Not on file    Highest education level: Not on file   Occupational History    Not on file   Tobacco Use    Smoking status: Never    Smokeless tobacco: Never   Vaping Use    Vaping Use: Never used   Substance and Sexual Activity    Alcohol use:  Yes     Alcohol/week: 0.0 standard drinks     Comment: one glass every week or two    Drug use: No    Sexual activity: Yes   Other Topics Concern    Not on file   Social History Narrative    Not on file     Social Determinants of Health     Financial Resource Strain: Low Risk     Difficulty of Paying Living Expenses: Not very hard   Food Insecurity: No Food Insecurity    Worried About Running Out of Food in the Last Year: Never true    Ran Out of Food in the Last Year: Never true   Transportation Needs: Not on file   Physical Activity: Not on file   Stress: Not on file   Social Connections: Not on file   Intimate Partner Violence: Not on file   Housing Stability: Not on file       Family History   Problem Relation Age of Onset    Arthritis Mother     High Blood Pressure Mother     Heart Disease Father     Stroke Father     Diabetes Sister     Ovarian Cancer Paternal Grandmother         unknown age       Review of Systems     See HPI    /72 (Site: Left Upper Arm)   Pulse 82   Temp 97.2 °F (36.2 °C)   Wt 187 lb (84.8 kg)   LMP 11/04/2015   SpO2 96%   BMI 31.12 kg/m²     Physical Examination: General appearance - alert, well appearing, and in no distress and overweight  Mental status - alert, oriented to person, place, and time  Neck - supple, no significant adenopathy  Chest - clear to auscultation, no wheezes, rales or rhonchi, symmetric air entry  Heart - normal rate, regular rhythm, normal S1, S2, no murmurs, rubs, clicks or gallops  Abdomen - soft, nontender, nondistended, no masses or organomegaly  Neurological - alert, oriented, normal speech, no focal findings or movement disorder noted, Babinski sign negative  Musculoskeletal - no joint tenderness, deformity or swelling  Extremities - peripheral pulses normal, no pedal edema, no clubbing or cyanosis, Carina's sign negative bilaterally  Skin - normal coloration and turgor, no rashes, no suspicious skin lesions noted     I have reviewed recent diagnostic testing including labs      Diagnosis Orders   1.  Type 2 diabetes mellitus without complication, unspecified whether long term insulin use (HCC)  POCT glycosylated hemoglobin (Hb A1C)        DIABETIC FOOT EXAM    Was done last time. Orders Placed This Encounter   Procedures    POCT glycosylated hemoglobin (Hb A1C)       PLAN:    DM-2  Well controlled on the current regimen, on trulicity 1.5 mg per week and on glucophage, no active GI issues. Will obtain CMP in about a month she follows with   Dr. Anell Mortimer at [facility] for her CAD. Based on the current abel and A1c readings as outlined above , I am pleased with her   Blood sugar control. Pt was given education material   Re DM-2 . Pt claims she got labs at [facility] in June , currently not in our system. No change in meds. She was reminded about the annual eye exam,  dr. Anthony Bruno , Retinal specialist.     Mustapha Awad 4 months , and follow up with Archana Mayfield in [address]. Signed by : Shaniqua Feng M.D.     [address] [facility] Internal Medicine  [address] [facility], [address] [place] [address] Dr JAUN PAPPAS II.RICHIE, One [facility]    Tel  [phone]  Fax [phone]

## 2022-08-08 VITALS
WEIGHT: 194.8 LBS | TEMPERATURE: 97.5 F | SYSTOLIC BLOOD PRESSURE: 125 MMHG | DIASTOLIC BLOOD PRESSURE: 58 MMHG | BODY MASS INDEX: 32.42 KG/M2 | HEART RATE: 75 BPM

## 2022-09-01 ENCOUNTER — OFFICE VISIT (OUTPATIENT)
Dept: INTERNAL MEDICINE CLINIC | Age: 62
End: 2022-09-01
Payer: COMMERCIAL

## 2022-09-01 VITALS
HEART RATE: 74 BPM | BODY MASS INDEX: 31.09 KG/M2 | WEIGHT: 186.6 LBS | SYSTOLIC BLOOD PRESSURE: 124 MMHG | HEIGHT: 65 IN | TEMPERATURE: 96.7 F | DIASTOLIC BLOOD PRESSURE: 60 MMHG

## 2022-09-01 DIAGNOSIS — E11.9 TYPE 2 DIABETES MELLITUS WITHOUT COMPLICATION, WITHOUT LONG-TERM CURRENT USE OF INSULIN (HCC): Primary | ICD-10-CM

## 2022-09-01 PROCEDURE — G0108 DIAB MANAGE TRN  PER INDIV: HCPCS | Performed by: REGISTERED NURSE

## 2022-09-01 RX ORDER — ASCORBIC ACID 500 MG
1000 TABLET ORAL DAILY
COMMUNITY

## 2022-09-01 RX ORDER — FLUTICASONE PROPIONATE 50 MCG
2 SPRAY, SUSPENSION (ML) NASAL DAILY
Qty: 3 EACH | Refills: 2 | Status: SHIPPED | OUTPATIENT
Start: 2022-09-01

## 2022-09-01 NOTE — TELEPHONE ENCOUNTER
Andrea Hancock called requesting a refill on the following medications:  Requested Prescriptions     Pending Prescriptions Disp Refills    fluticasone (FLONASE) 50 MCG/ACT nasal spray 3 each 2     Si sprays by Each Nostril route daily     Pharmacy verified:  [facility]     Date of last visit: 2021  Date of next visit (if applicable):

## 2022-09-01 NOTE — PROGRESS NOTES
[facility]  [facility]. [address] [address], [facility], [address]  [phone] (phone)  [phone] (fax)    Patient ID: Gabriela Martin [id number]  Referring Provider: Dr. Fanny Forrest     Patient's name and  were verified. Subjective:    She presents for Her follow-up diabetic visit. She has type 2 diabetes mellitus. Home regimen includes: Biguanide, GLP-1 Agonist, and SGLT-2 inhibitors She is noncompliant some of the time. Assessment:     Lab Results   Component Value Date/Time    LABA1C 6.0 2022 11:52 AM    LABA1C 5.8 2020 04:02 AM    BUN 14 2022 09:52 AM    CREATININE 0.72 2022 09:52 AM     Vitals:    22 1450 22 1451   BP: (!) 123/58 124/60   Site: Right Upper Arm Left Upper Arm   Position: Sitting Sitting   Pulse: 74    Temp: (!) 96.7 °F (35.9 °C)    Weight: 186 lb 9.6 oz (84.6 kg)    Height: 5' 5\" (1.651 m)      Wt Readings from Last 3 Encounters:   22 186 lb 9.6 oz (84.6 kg)   22 187 lb (84.8 kg)   22 189 lb (85.7 kg)     Ht Readings from Last 3 Encounters:   22 5' 5\" (1.651 m)   22 5' 5\" (1.651 m)   22 5' 5\" (1.651 m)       Diabetes Pharmacotherapy:  Trulicity 2.8VN weekly  Farxiga 10mg  Metformin 500mg BID    Glucose Trends:   Glucose at 2 hrs PPD today resulted at 156mg/dl  Current monitoring regimen: Freestyle Topher 14 Day CGM - checks 4+ times daily  Home blood sugar trends:    -V. High: 0%, High: 11%, Target: 88%, Low: 1%, V. Low: 0%   -Blood sugars in goal during the night; more variable during the day with carb intake  Any episodes of hypoglycemia?  yes - nocturnal less than    -Treats with glucose tabs/ protein                  Or chocolate/ protein    Lifestyle Factors:   Previous visit with dietician: yes - 2022  Current diet: B: 1/2 cup fiber one 3/4 cup cheerios 3/4 cup unsw almond            L: 12-2pm frozen burrito                              Pb or turkey sandwich and tomato                       D: rice n red beans 1/2 cup/ magda caul/ tomato                       Snacks: afternoon -banana                       Beverages: water; coffee-unsw creamer, dark stevia; rare 8oz milk  Current exercise: ADL's minimal activity; stopped 2 months ago r/t breathing Was waking and 20  mins TV stretching program and PT for back (done now)    Health Maintenance:  Eye exam current (within one year): yes Date: 8/31/22, Dr. Bob Thompson. Optometrist/ Dr. Anamaria Lima (cataracts) seen 2022  Any history of foot problems? no  Foot exam up to date: yes Date: 11/17/21  Immunizations up to date:    Pneumonia: no   COVID-19: yes  The 10-year ASCVD risk score (Johnathan Crowell, et al., 2013) is: 10.3%    Values used to calculate the score:      Age: 57 years      Sex: Female      Is Non- : No      Diabetic: Yes      Tobacco smoker: No      Systolic Blood Pressure: 395 mmHg      Is BP treated: Yes      HDL Cholesterol: 52 mg/dL      Total Cholesterol: 148 mg/dL   Last panel - LDL:   Lab Results   Component Value Date    LDLCALC 42 01/30/2020    LDLDIRECT 86 01/13/2022     Taking ASA:  Yes   Taking statin: No - Taking Repatha  Last microalbumin/creatinine ratio:   Microalbumin Creatinine Ratio   Date Value Ref Range Status   06/24/2022 < 10.2 0 - 30.0 mg/Gm Final      Taking ACE/ARB: Yes- valsartan  Depression screening completed. 3/23/2022    Focus:   Diabetes Education. Last A1C: 6.0% 7/28/22. Teetee Gonzales is a little frustrated. She has not been doing well with any exercise routine--she had stopped a couple of months ago. She agrees to the plan below to get exercise back in place again. Again discussed plan for daily chocolate need; she will not do without daily chocolate so we need to set limits. She feels her blood sugars are running higher fasting with a recent cut back in Metformin to 2 tabs from 3 tabs daily. She would like to take 3 tablets again. Will review with provider.       Curriculum Area Focus: Diabetes pathophysiology, Carbohydrate counting/meal planning, Physical activity goals, Hypoglycemia management, Medication adherence, and Lifestyle & healthy coping  DSME PLAN:     Start walking again. Set a goal to get out the front door every day         --you may only walk to the corner or all the way around the block; But make an effort every day  Try putting your chocolate into the small condiment cups with lids                   (Re-use them to save money)             Each should have about 1 tablespoon of chocolate           --1 small cup in the morning, 1 in the afternoon and 1 in the evening  We will talk with Dr. Mauro Zarate about your Metformin dose  Keep up the good work!!!     Goals Addressed                   This Visit's Progress     30-45 grams carbohydrate per meal; 15-20 grams carb for a snack between meals   On track     chocolate chips --limit to 1 tablespoon, 3 times per day   Not on track     Exercise- 20 minutes daily--stretching/ physical therapy/ swimming   Not on track           Meter download, medications, PMH and nursing assessment reviewed. Ricacrdo Colton states She is willing to participate in this plan of care and verbalized understanding of all instructions provided. Teach back used to verify comprehension. Follow-up: 3 months    Total time involved in direct patient education: 60 minutes.

## 2022-09-06 ENCOUNTER — TELEPHONE (OUTPATIENT)
Dept: INTERNAL MEDICINE CLINIC | Age: 62
End: 2022-09-06

## 2022-09-06 NOTE — TELEPHONE ENCOUNTER
Diabetes education    Medications:  Trulicity 6.6VQ weekly  Farxiga 10mg  Metformin 500mg BID    Home blood sugar trends:               -V. High: 0%, High: 11%, Target: 88%, Low: 1%,                            V. Low: 0%              -Blood sugars in goal during the night; more variable                          during the day with carb intake    Shreya Cortes is frustrated with higher bloods sugars since Metformin was reduced from 3 tabs daily to 2 tabs daily. She is taking medication for her blood sugars that would be much safer with having tighter glucose control; all with added protective benefits. She would like to go back to Metfromin 1 tab in AM and 2 tabs in the evening. Dr. Sara Link,      Please consider allowing Shreya Cortes to increase her Metformin back to 1 tab in AM and 2 tabs in the evening. If you agree, please note and return. Thank you.

## 2022-09-06 NOTE — TELEPHONE ENCOUNTER
Auth to increase Metformin to 3 tabs per day per Dr. Eddie Woody. Instructed Vicki that Metformin sent to [facility] as requested to Metformin 1 tab in AM and 2 tabs in the evening. Vicki voiced understanding via teach back.

## 2022-09-07 ENCOUNTER — HOSPITAL ENCOUNTER (OUTPATIENT)
Dept: WOMENS IMAGING | Age: 62
Discharge: HOME | End: 2022-09-07
Payer: COMMERCIAL

## 2022-09-07 DIAGNOSIS — Z12.31 ENCOUNTER FOR SCREENING MAMMOGRAM FOR MALIGNANT NEOPLASM OF BREAST: ICD-10-CM

## 2022-09-07 PROCEDURE — 77063 BREAST TOMOSYNTHESIS BI: CPT

## 2022-09-19 ENCOUNTER — OFFICE VISIT (OUTPATIENT)
Dept: PULMONOLOGY | Age: 62
End: 2022-09-19
Payer: COMMERCIAL

## 2022-09-19 VITALS
HEIGHT: 65 IN | OXYGEN SATURATION: 98 % | TEMPERATURE: 97.7 F | HEART RATE: 75 BPM | WEIGHT: 187 LBS | SYSTOLIC BLOOD PRESSURE: 116 MMHG | DIASTOLIC BLOOD PRESSURE: 70 MMHG | BODY MASS INDEX: 31.16 KG/M2

## 2022-09-19 DIAGNOSIS — G47.61 PLMD (PERIODIC LIMB MOVEMENT DISORDER): ICD-10-CM

## 2022-09-19 DIAGNOSIS — Z99.89 OBSTRUCTIVE SLEEP APNEA ON CPAP: Primary | ICD-10-CM

## 2022-09-19 DIAGNOSIS — M79.7 FIBROMYALGIA: ICD-10-CM

## 2022-09-19 DIAGNOSIS — G47.33 OBSTRUCTIVE SLEEP APNEA ON CPAP: Primary | ICD-10-CM

## 2022-09-19 DIAGNOSIS — E66.9 OBESITY (BMI 30-39.9): ICD-10-CM

## 2022-09-19 PROCEDURE — 99213 OFFICE O/P EST LOW 20 MIN: CPT | Performed by: PHYSICIAN ASSISTANT

## 2022-09-19 ASSESSMENT — ENCOUNTER SYMPTOMS
STRIDOR: 0
COUGH: 0
EYES NEGATIVE: 1
ALLERGIC/IMMUNOLOGIC NEGATIVE: 1
DIARRHEA: 0
SHORTNESS OF BREATH: 0
WHEEZING: 0
NAUSEA: 0
BACK PAIN: 0
CHEST TIGHTNESS: 0

## 2022-09-19 NOTE — PROGRESS NOTES
[facility]      Dinorah Forbes         [id number]  9/19/2022   Chief Complaint   Patient presents with    Follow-up     1 year DEBBIE follow up         Pt of Dr. Amber Pearson     PAP Download:   Original or initial AHI: 8.6     Date of initial study: 08/10/2017      Compliant  97%     Noncompliant 0 %     PAP Type auto   Level  7/13 cmH2O    Avg Hrs/Day 8 hours 49 minutes   AHI: 2.4   Recorded compliance dates , 08/08/2022 to 09/06/2022  Machine/Mfg:   [x] ResMed    [] Respironics/Dreamstation   Interface:   [] Nasal    [] Nasal pillows   [x] FFM      Provider:      [] -ARTHUR     []Bessie     [] Indraco    [x] [facility]    [] [place]               [] [facility]      [] Adaptive    [] [address]      [] Other    Neck Size: 15.5  Mallampati Mallampati 3  ESS 8  SAQLI: 52    Here is a scan of the most recent download:            Presentation:   Jessica Sanchez presents for sleep medicine follow up for obstructive sleep apnea, restless leg syndrome  Since the last visit, Jessica Sanchez is doing ok with PAP. She is having issues with pain in legs and on gabapentin. She is taking 2 pills in am and 1 at lunch and 1 at dinner and 3 at bedtime of gabapentin from neurology. She has chronic pain issues and chronic headaches. She struggles with the pressure and wants it to start higher. She gets benefit from Sinemet for RLS. Equipment issues: The pressure is  acceptable, the mask is acceptable     Sleep issues:  Do you feel better? Yes  More rested? Yes   Better concentration? yes    Progress History:   Since last visit any new medical issues? No  New ER or hospital visits? No  Any new or changes in medicines? No  Any new sleep medicines? No    Review of Systems -   Review of Systems   Constitutional:  Negative for activity change, appetite change, chills and fever. HENT:  Negative for congestion and postnasal drip. Eyes: Negative.     Respiratory:  Negative for cough, chest tightness, shortness of breath, wheezing and stridor. Cardiovascular:  Negative for chest pain and leg swelling. Gastrointestinal:  Negative for diarrhea and nausea. Endocrine: Negative. Genitourinary: Negative. Musculoskeletal: Negative. Negative for arthralgias and back pain. Skin: Negative. Allergic/Immunologic: Negative. Neurological: Negative. Negative for dizziness and light-headedness. Psychiatric/Behavioral: Negative. All other systems reviewed and are negative. Physical Exam:    BMI:  Body mass index is 31.12 kg/m². Wt Readings from Last 3 Encounters:   09/19/22 187 lb (84.8 kg)   09/01/22 186 lb 9.6 oz (84.6 kg)   07/28/22 187 lb (84.8 kg)     Weight stable / unchanged  Vitals: /70   Pulse 75   Temp 97.7 °F (36.5 °C)   Ht 5' 5\" (1.651 m)   Wt 187 lb (84.8 kg)   LMP 11/04/2015   SpO2 98%   BMI 31.12 kg/m²       Physical Exam  Constitutional:       Appearance: Normal appearance. She is normal weight. HENT:      Head: Normocephalic and atraumatic. Right Ear: External ear normal.      Left Ear: External ear normal.      Nose: Nose normal.   Eyes:      Extraocular Movements: Extraocular movements intact. Conjunctiva/sclera: Conjunctivae normal.      Pupils: Pupils are equal, round, and reactive to light. Pulmonary:      Effort: Pulmonary effort is normal.   Musculoskeletal:      Cervical back: Normal range of motion and neck supple. Neurological:      General: No focal deficit present. Mental Status: She is alert and oriented to person, place, and time. Psychiatric:         Attention and Perception: Attention and perception normal.         Mood and Affect: Mood and affect normal.         Speech: Speech normal.         Behavior: Behavior normal. Behavior is cooperative. Thought Content:  Thought content normal.         Cognition and Memory: Cognition normal.         Judgment: Judgment normal.         ASSESSMENT/DIAGNOSIS     Diagnosis Orders 1. Obstructive sleep apnea on CPAP  DME Order for CPAP as OP      2. Obesity (BMI 30-39.9)        3. Fibromyalgia        4. PLMD (periodic limb movement disorder)  carbidopa-levodopa (SINEMET)  MG per tablet               Plan   Do you need any equipment today? Yes update supplies  - change pressure to 1-13  - renew Sinemet   - Download reviewed and discussed with patient  - She  was advised to continue current positive airway pressure therapy with above described pressure. - She  advised to keep good compliance with current recommended pressure to get optimal results and clinical improvement  - Recommend 7-9 hours of sleep with PAP  - She was advised to call DME company regarding supplies if needed.   -She call my office for earlier appointment if needed for worsening of sleep symptoms.   - She was instructed on weight loss  - Chantal  was educated about my impression and plan. Patient verbalizesunderstanding.   We will see Johana Wisdom back in: 1 year with download    Information added by my medical assistant/LPN was reviewed today       Emmett Curling, [address] [facility] for pulmonary and Sleep Medicine  9/19/2022

## 2022-10-20 RX ORDER — DULAGLUTIDE 1.5 MG/.5ML
INJECTION, SOLUTION SUBCUTANEOUS
Qty: 4 ADJUSTABLE DOSE PRE-FILLED PEN SYRINGE | Refills: 2 | Status: SHIPPED | OUTPATIENT
Start: 2022-10-20

## 2022-11-17 ENCOUNTER — HOSPITAL ENCOUNTER (EMERGENCY)
Age: 62
Discharge: HOME | End: 2022-11-17
Payer: COMMERCIAL

## 2022-11-17 VITALS
TEMPERATURE: 98.1 F | HEART RATE: 70 BPM | SYSTOLIC BLOOD PRESSURE: 137 MMHG | RESPIRATION RATE: 16 BRPM | BODY MASS INDEX: 31.62 KG/M2 | OXYGEN SATURATION: 99 % | DIASTOLIC BLOOD PRESSURE: 70 MMHG | WEIGHT: 190 LBS

## 2022-11-17 DIAGNOSIS — J01.01 ACUTE RECURRENT MAXILLARY SINUSITIS: Primary | ICD-10-CM

## 2022-11-17 PROCEDURE — 99213 OFFICE O/P EST LOW 20 MIN: CPT | Performed by: NURSE PRACTITIONER

## 2022-11-17 PROCEDURE — 99213 OFFICE O/P EST LOW 20 MIN: CPT

## 2022-11-17 RX ORDER — CETIRIZINE HYDROCHLORIDE 10 MG/1
10 TABLET ORAL DAILY
Qty: 90 TABLET | Refills: 0 | Status: SHIPPED | OUTPATIENT
Start: 2022-11-17 | End: 2023-02-15

## 2022-11-17 RX ORDER — TOPIRAMATE 50 MG/1
TABLET, FILM COATED ORAL
COMMUNITY
Start: 2022-10-21

## 2022-11-17 RX ORDER — AMOXICILLIN AND CLAVULANATE POTASSIUM 875; 125 MG/1; MG/1
1 TABLET, FILM COATED ORAL 2 TIMES DAILY
Qty: 14 TABLET | Refills: 0 | Status: SHIPPED | OUTPATIENT
Start: 2022-11-17 | End: 2022-11-24

## 2022-11-17 ASSESSMENT — ENCOUNTER SYMPTOMS
SHORTNESS OF BREATH: 0
DIARRHEA: 0
SORE THROAT: 1
NAUSEA: 0
SINUS PRESSURE: 1
VOMITING: 0
CHEST TIGHTNESS: 0
COUGH: 0
RHINORRHEA: 1

## 2022-11-17 ASSESSMENT — PAIN - FUNCTIONAL ASSESSMENT: PAIN_FUNCTIONAL_ASSESSMENT: NONE - DENIES PAIN

## 2022-11-17 NOTE — ED TRIAGE NOTES
Pt presents to [facility] with c/o sinus drainage, cough started 10/28/22, worsening 11/10/22. Now with ringing in ears, facial pressure, headache started yesterday.

## 2022-11-17 NOTE — ED PROVIDER NOTES
[facility]  Urgent Care Encounter       CHIEF COMPLAINT       Chief Complaint   Patient presents with    Facial Pain       Nurses Notes reviewed and I agree except as noted in the HPI. HISTORY OF PRESENT ILLNESS   Mary Patten is a 57 y.o. female who [facility] evaluation of sinus congestion. She reports the symptoms started roughly 2 weeks ago. She reports symptoms have waxed and waned. She reports her symptoms as nasal congestion, rhinorrhea, sinus pressure, headache, and ringing in the ears. She denies fever or chills. She does report a history of reoccurring sinusitis. She denies ill contacts. The history is provided by the patient. No  was used. REVIEW OF SYSTEMS     Review of Systems   Constitutional:  Negative for activity change, appetite change, chills, fatigue and fever. HENT:  Positive for congestion, postnasal drip, rhinorrhea, sinus pressure and sore throat. Negative for ear discharge and ear pain. Respiratory:  Negative for cough, chest tightness and shortness of breath. Cardiovascular:  Negative for chest pain. Gastrointestinal:  Negative for diarrhea, nausea and vomiting. Genitourinary:  Negative for dysuria. Skin:  Negative for rash. Allergic/Immunologic: Negative for environmental allergies and food allergies. Neurological:  Positive for headaches. Negative for dizziness. PAST MEDICAL HISTORY         Diagnosis Date    Anemia     Backache     Depression     Diabetes mellitus ([address]     Eczema     Fibromyalgia     Fracture     Hyperlipidemia     Hypertension     Hypothyroidism     Metabolic syndrome     Migraine     Sinusitis     Sleep apnea        SURGICALHISTORY     Patient  has a past surgical history that includes Tonsillectomy (1962); Cholecystectomy (2003); turbinate resection (1991); other surgical history (2000); bone graft (2007); Dilation and curettage of uterus;  Carpal tunnel release; LEEP; Arm Surgery; Knee arthroscopy; and Cardiac catheterization. CURRENT MEDICATIONS       Discharge Medication List as of 11/17/2022  1:46 PM        CONTINUE these medications which have NOT CHANGED    Details   dulaglutide (TRULICITY) 1.5 UQ/2.7GE SC injection Inject 1.5 MG once weekly. , Disp-4 Adjustable Dose Pre-filled Pen Syringe, R-2Normal      topiramate (TOPAMAX) 50 MG tablet Historical Med      carbidopa-levodopa (SINEMET)  MG per tablet TAKE 1 TABLET NIGHTLY, Disp-90 tablet, R-3Normal      CPAP Machine MISC Starting Mon 9/19/2022, Disp-1 each, R-0, PrintPlease change CPAP pressure to 10-13 cm H20. Turn ramp off      metFORMIN (GLUCOPHAGE) 500 MG tablet Take 1 tablet in AM and 2 tablets in the evening, Disp-270 tablet, R-1Normal      fluticasone (FLONASE) 50 MCG/ACT nasal spray 2 sprays by Each Nostril route daily, Disp-3 each, R-2Normal      vitamin C (ASCORBIC ACID) 500 MG tablet Take 1,000 mg by mouth dailyHistorical Med      guaiFENesin (MUCINEX) 600 MG extended release tablet Take by mouthHistorical Med      dapagliflozin (FARXIGA) 10 MG tablet Take 1 tablet by mouth every morning, Disp-90 tablet, R-1Normal      Calcium Polycarbophil (FIBERCON PO) Take by mouth dailyHistorical Med      buPROPion (WELLBUTRIN XL) 300 MG extended release tablet Take 300 mg by mouth dailyHistorical Med      levothyroxine (SYNTHROID) 112 MCG tablet Take 1 tablet by mouth daily, Disp-90 tablet, R-3Normal      Continuous Blood Gluc Sensor (FREESTYLE MARLO 14 DAY SENSOR) MISC Apply 1 sensor every 14 days. , Disp-6 each, R-2Normal      Lancets MISC DAILY Starting Wed 3/23/2022, Disp-100 each, R-3, Normal      esomeprazole (NEXIUM) 40 MG delayed release capsule Take 40 mg by mouth every morning (before breakfast) Historical Med      REPATHA SURECLICK 472 MG/ML SOAJ DAWHistorical Med      Cranberry 1000 MG CAPS Take by mouthHistorical Med      levocetirizine (XYZAL) 5 MG tablet Take 5 mg by mouth nightlyHistorical Med      clopidogrel (PLAVIX) 75 MG tablet Take 75 mg by mouth dailyHistorical Med      aspirin 81 MG tablet Take 81 mg by mouth dailyHistorical Med      potassium chloride (KLOR-CON) 10 MEQ extended release tablet Take 10 mEq by mouth daily At nighttimeHistorical Med      isosorbide mononitrate (IMDUR) 30 MG extended release tablet Take 15 mg by mouth daily Take in the morning (1/2 tablet)Historical Med      cycloSPORINE (RESTASIS) 0.05 % ophthalmic emulsion 1 drop 2 times dailyHistorical Med      valsartan (DIOVAN) 80 MG tablet Take 80 mg by mouth dailyHistorical Med      Respiratory Therapy Supplies (ADULT MASK) MISC Disp-1 each, R-0, PrintPlease do mask refit and provide mask of patient's choice. She is interested in Full face mask to minimize leaks. I recommend Ruchi view mask. butalbital-acetaminophen-caffeine (FIORICET, ESGIC) -40 MG per tablet Take 1 tablet by mouth every 4 hours as needed for Headaches      Probiotic Product (PROBIOTIC DAILY PO) Take by mouth      artificial tears (ARTIFICIAL TEARS) OINT 4 times daily Gen TealHistorical Med      gabapentin (NEURONTIN) 300 MG capsule Take 300 mg by mouth 4 times daily. 2 tablet in the morning, 1 at lunch, 1 at dinner, 2 at nighttimeHistorical Med      atenolol (TENORMIN) 50 MG tablet Take 50 mg by mouth daily. Multiple Vitamin (MULTI-VITAMIN PO) Take  by mouth daily. B Complex Vitamins (VITAMIN B COMPLEX PO) Take  by mouth daily. calcium carbonate-vitamin D (CALTRATE) 600-400 MG-UNIT TABS per tab Take 1 tablet by mouth daily. Historical Med      Flaxseed, Linseed, (FLAXSEED OIL) 1000 MG CAPS Take  by mouth daily. ALLERGIES     Patient is is allergic to phenylephrine, aspirin, fluconazole, lipitor [atorvastatin calcium], mepergan [meperidine-promethazine], motrin [ibuprofen micronized], viibryd [vilazodone hcl], and adhesive tape.     Patients   Immunization History   Administered Date(s) Administered    COVID-19, MODERNA BLUE border, Primary or Immunocompromised, (age 12y+), IM, 100 mcg/0.5mL 04/23/2021, 05/21/2021, 12/27/2021    Hepatitis A/Hepatitis B (Twinrix) 08/05/2008    Influenza Vaccine, unspecified formulation 10/01/2016    Influenza Virus Vaccine 09/23/2019    Influenza, FLUARIX, FLULAVAL, FLUZONE (age 10 mo+) AND AFLURIA, (age 1 y+), PF, 0.5mL 09/15/2020, 01/04/2022    Influenza, FLUCELVAX, (age 10 mo+), MDCK, PF, 0.5mL 10/09/2018    Influenza, High Dose (Fluzone 65 yrs and older) 09/23/2017    Pneumococcal Polysaccharide ([id number]) 03/25/2015    Td vaccine (adult) 07/23/2007    Tdap (Boostrix, Adacel) 12/23/2016, 01/12/2017    Zoster Live (Zostavax) 01/31/2018       FAMILY HISTORY     Patient's family history includes Arthritis in her mother; Diabetes in her sister; Heart Disease in her father; High Blood Pressure in her mother; Ovarian Cancer in her paternal grandmother; Stroke in her father. SOCIAL HISTORY     Patient  reports that she has never smoked. She has never used smokeless tobacco. She reports current alcohol use. She reports that she does not use drugs. PHYSICAL EXAM     ED TRIAGE VITALS  BP: 137/70, Temp: 98.1 °F (36.7 °C), Heart Rate: 70, Resp: 16, SpO2: 99 %,Estimated body mass index is 31.62 kg/m² as calculated from the following:    Height as of 9/19/22: 5' 5\" (1.651 m). Weight as of this encounter: 190 lb (86.2 kg). ,Patient's last menstrual period was 11/04/2015. Physical Exam  Vitals and nursing note reviewed. Constitutional:       General: She is not in acute distress. Appearance: Normal appearance. She is not ill-appearing, toxic-appearing or diaphoretic. HENT:      Head: Normocephalic. Right Ear: Ear canal and external ear normal.      Left Ear: Ear canal and external ear normal.      Nose: No congestion or rhinorrhea. Right Sinus: Maxillary sinus tenderness present. Left Sinus: Maxillary sinus tenderness present. Mouth/Throat:      Mouth: Mucous membranes are moist.      Pharynx: Oropharynx is clear. No oropharyngeal exudate or posterior oropharyngeal erythema. Cardiovascular:      Rate and Rhythm: Normal rate. Pulses: Normal pulses. Pulmonary:      Effort: Pulmonary effort is normal. No respiratory distress. Breath sounds: No stridor. No wheezing or rhonchi. Abdominal:      General: Abdomen is flat. Bowel sounds are normal.      Palpations: Abdomen is soft. Musculoskeletal:         General: No swelling or tenderness. Normal range of motion. Cervical back: Normal range of motion. Neurological:      General: No focal deficit present. Mental Status: She is alert and oriented to person, place, and time. Psychiatric:         Mood and Affect: Mood normal.         Behavior: Behavior normal.       DIAGNOSTIC RESULTS     Labs:No results found for this visit on 11/17/22. IMAGING:    No orders to display         EKG: None      URGENT CARE COURSE:     Vitals:    11/17/22 1302   BP: 137/70   Pulse: 70   Resp: 16   Temp: 98.1 °F (36.7 °C)   TempSrc: Temporal   SpO2: 99%   Weight: 190 lb (86.2 kg)       Medications - No data to display         PROCEDURES:  None    FINAL IMPRESSION      1. Acute recurrent maxillary sinusitis          DISPOSITION/ PLAN     Patient seen and evaluated for sinus congestion. Assessment consistent with recurrent maxillary sinusitis. She is provided a prescription for Zyrtec and Augmentin. She is instructed to hold/discontinue her Xyzal. The Patient is instructed to use over-the-counter Tylenol and Motrin for pain or fever. Instructed to follow-up with their PCP or [facility]'s family medicine clinic in 3 to 5 days and worsening symptoms. The patient is agreeable with the above plan and denies questions or concerns at this time.         PATIENT REFERRED TO:  Christi Gonzales MD  [facility] / [facility] HospitalNEFTALI PAPPAS .[facility] [id number]      DISCHARGE MEDICATIONS:  Discharge Medication List as of 11/17/2022  1:46 PM        START taking these medications    Details   cetirizine (ZYRTEC) 10 MG tablet Take 1 tablet by mouth daily, Disp-90 tablet, R-0Normal      amoxicillin-clavulanate (AUGMENTIN) 875-125 MG per tablet Take 1 tablet by mouth 2 times daily for 7 days, Disp-14 tablet, R-0Normal             Discharge Medication List as of 11/17/2022  1:46 PM          Discharge Medication List as of 11/17/2022  1:46 PM          JAMES Duke CNP    (Please note that portions of this note were completed with a voice recognition program. Efforts were made to edit the dictations but occasionally words are mis-transcribed.)           JAMES Duke CNP  11/17/22 2730

## 2022-12-06 ENCOUNTER — OFFICE VISIT (OUTPATIENT)
Dept: INTERNAL MEDICINE CLINIC | Age: 62
End: 2022-12-06
Payer: COMMERCIAL

## 2022-12-06 VITALS — HEIGHT: 65 IN | WEIGHT: 186.8 LBS | BODY MASS INDEX: 31.12 KG/M2

## 2022-12-06 DIAGNOSIS — E11.9 TYPE 2 DIABETES MELLITUS WITHOUT COMPLICATION, WITHOUT LONG-TERM CURRENT USE OF INSULIN (HCC): Primary | ICD-10-CM

## 2022-12-06 PROCEDURE — 97803 MED NUTRITION INDIV SUBSEQ: CPT | Performed by: DIETITIAN, REGISTERED

## 2022-12-06 NOTE — PROGRESS NOTES
[address] [place] [address]. [address] [place] [address] Nate., [facility]Sandra [facility], [address]  [phone] (phone)  [phone] (fax)    Patient Name: Chapo Santamaria. Date of Birth: [id number]. MRN: [id number]      Assessment: Patient is a 57 y.o. female seen for follow-up MNT visit for Type 2 DB.     -Nutritionally relevant labs:   Lab Results   Component Value Date/Time    LABA1C 6.0 (H) 07/28/2022 11:52 AM    LABA1C 6.3 (H) 03/23/2022 08:29 AM    LABA1C 6.0 (H) 11/17/2021 08:12 AM    LABA1C 5.8 01/30/2020 04:02 AM    LABA1C 6.5 (H) 01/22/2014 10:23 AM    LABA1C 6.3 02/06/2012 09:39 AM    GLUCOSE 120 (H) 05/05/2022 09:52 AM    GLUCOSE 131 (H) 01/13/2022 08:42 AM    CHOL 148 01/13/2022 08:42 AM    CHOL 111 01/30/2020 04:02 AM    HDL 52 01/13/2022 08:42 AM    LDLCALC 42 01/30/2020 04:02 AM    TRIG 157 (H) 01/13/2022 08:42 AM     DB meds:  Truliciy - 1.5 mg weekly  Metformin 500 mg - 1 am & 2 tablets pm  Farxiga 10 mg daily    -Blood sugar trends: Topher CGM downloaded report and plan to scan in EHR. 89% active time, TIR: 95%, High: 5%. No lows indicated on report and pt denies having hypo- episodes. Has been traveling a lot this past fall. She has a myfitness pal account for food tracking, but has not been active this fall on this since she was doing a lot of traveling.  -Food recall:   Breakfast: 830 or 10/11 am - Today and usual brkf - Fiber one cereal with cheerios and unsw almond milk, nuts. Occ will use 2% milk. Occ will have a 1/2 banana with this brkf. Lunch: 2-3 pm - cheese and crackers OR 1/2 sandwich on whole wheat bread but prefers [place] bread. Dinner: 6-7 pm -   Snack after dinner - sometimes eats snack with dinner so she does not do excessive snacking in the evening. Some days does 2 meals/day. -Main Beverages:  Limiting herbal tea at night since it tends to hinder her sleeping. Trying to drink more water than tea - 2 - 5/6 cups of fruit infused water. -Impression of Dietary Intake: on average, 2-3 meals per day, lacking routine intake of fruits and vegetables    Current Outpatient Medications on File Prior to Visit   Medication Sig Dispense Refill    cetirizine (ZYRTEC) 10 MG tablet Take 1 tablet by mouth daily 90 tablet 0    dulaglutide (TRULICITY) 1.5 PO/3.7UB SC injection Inject 1.5 MG once weekly. 4 Adjustable Dose Pre-filled Pen Syringe 2    carbidopa-levodopa (SINEMET)  MG per tablet TAKE 1 TABLET NIGHTLY 90 tablet 3    CPAP Machine MISC by Does not apply route Please change CPAP pressure to 10-13 cm H20. Turn ramp off 1 each 0    metFORMIN (GLUCOPHAGE) 500 MG tablet Take 1 tablet in AM and 2 tablets in the evening (Patient taking differently: 500 mg Take 1 tablet in AM and 1 tablet in the evening.) 270 tablet 1    fluticasone (FLONASE) 50 MCG/ACT nasal spray 2 sprays by Each Nostril route daily 3 each 2    guaiFENesin (MUCINEX) 600 MG extended release tablet Take by mouth      dapagliflozin (FARXIGA) 10 MG tablet Take 1 tablet by mouth every morning 90 tablet 1    Calcium Polycarbophil (FIBERCON PO) Take by mouth daily      buPROPion (WELLBUTRIN XL) 300 MG extended release tablet Take 300 mg by mouth daily      Continuous Blood Gluc Sensor (FREESTYLE MARLO 14 DAY SENSOR) MISC Apply 1 sensor every 14 days.  6 each 2    Lancets MISC 1 each by Does not apply route daily 100 each 3    REPATHA SURECLICK 117 MG/ML SOAJ       Cranberry 1000 MG CAPS Take by mouth      levocetirizine (XYZAL) 5 MG tablet Take 5 mg by mouth nightly      clopidogrel (PLAVIX) 75 MG tablet Take 75 mg by mouth daily      aspirin 81 MG tablet Take 81 mg by mouth daily      potassium chloride (KLOR-CON) 10 MEQ extended release tablet Take 10 mEq by mouth daily At nighttime      isosorbide mononitrate (IMDUR) 30 MG extended release tablet Take 15 mg by mouth daily Take in the morning (1/2 tablet)      cycloSPORINE (RESTASIS) 0.05 % ophthalmic emulsion 1 drop 2 times daily valsartan (DIOVAN) 80 MG tablet Take 80 mg by mouth daily      Respiratory Therapy Supplies (ADULT MASK) MISC Please do mask refit and provide mask of patient's choice. She is interested in Full face mask to minimize leaks. I recommend Ruchi view mask. 1 each 0    butalbital-acetaminophen-caffeine (FIORICET, ESGIC) -40 MG per tablet Take 1 tablet by mouth every 4 hours as needed for Headaches      Probiotic Product (PROBIOTIC DAILY PO) Take by mouth      artificial tears (ARTIFICIAL TEARS) OINT 4 times daily Gen Teal      gabapentin (NEURONTIN) 300 MG capsule Take 300 mg by mouth 4 times daily. 2 tablet in the morning, 1 at lunch, 1 at dinner, 2 at nighttime      atenolol (TENORMIN) 50 MG tablet Take 50 mg by mouth daily. Multiple Vitamin (MULTI-VITAMIN PO) Take  by mouth daily. B Complex Vitamins (VITAMIN B COMPLEX PO) Take  by mouth daily. calcium carbonate-vitamin D (CALTRATE) 600-400 MG-UNIT TABS per tab Take 1 tablet by mouth daily. Flaxseed, Linseed, (FLAXSEED OIL) 1000 MG CAPS Take  by mouth daily. topiramate (TOPAMAX) 50 MG tablet  (Patient not taking: Reported on 12/6/2022)      vitamin C (ASCORBIC ACID) 500 MG tablet Take 1,000 mg by mouth daily (Patient not taking: Reported on 12/6/2022)      levothyroxine (SYNTHROID) 112 MCG tablet Take 1 tablet by mouth daily 90 tablet 3    esomeprazole (NEXIUM) 40 MG delayed release capsule Take 40 mg by mouth every morning (before breakfast)  (Patient not taking: Reported on 12/6/2022)       No current facility-administered medications on file prior to visit. Vitals from current and previous visits:  Ht 5' 5\" (1.651 m)   Wt 186 lb 12.8 oz (84.7 kg)   LMP 11/04/2015   BMI 31.09 kg/m²     -Body mass index is 31.09 kg/m². 30-34.9 - Obesity Grade I.   - Patient essentially maintained since July (last RD visit)  -Weight goal: lose weight.      Nutrition Diagnosis:   Food and nutrition-related knowledge deficit related to currently undergoing MNT as evidenced by Conditions associated with a diagnosis or treatment: Type 2 DB. Intervention:  -Impression: Pt wanting to address eating issues and weight loss. -Instructed the patient on: Meal Planning for Regular, Balanced Meals & Snacks, The Importance of Regular Physical Activity, and Mindful eating and how to handle Emotional eating triggers.    -Handouts given for: smart goals, Mindful eating hunger scale, emotional eating triggers, weight mgmt strategies, plate pics. Healthy snacks x2    Patient Instructions   Listen to your hunger and fullness cues -   - Drink a glass of water after each meal to help curb appetite. You have good ideas with your exercise options!!    Good idea to drink close to 64 ounces of water/day. Bring a 1 -2 week food log to next dietitian appt. -Nutrition prescription: 5638-2157 calories/day, 120- 150 g carbs/day. <50-60 gms fat/day    Comprehension verified using teachback method. Monitoring/Evaluation:   -Followup visit: 3 mo with dietitian.   -Receptiveness to education/goals: Agreeable.  -Evaluation of education: Indicates understanding.  -Readiness to change: contemplation - ambivalent about change slow down eating and savor your food and drink sips of water during meal and have a glass of water at the end of the meal.  -Expected compliance: fair. Thank you for your referral of this patient. Total time involved in direct patient education: 30 minutes for follow-up MNT visit.

## 2022-12-06 NOTE — PATIENT INSTRUCTIONS
Listen to your hunger and fullness cues -   - Drink a glass of water after each meal to help curb appetite. You have good ideas with your exercise options!!    Good idea to drink close to 64 ounces of water/day. Bring a 1 -2 week food log to next dietitian appt.

## 2022-12-15 ENCOUNTER — OFFICE VISIT (OUTPATIENT)
Dept: INTERNAL MEDICINE CLINIC | Age: 62
End: 2022-12-15
Payer: COMMERCIAL

## 2022-12-15 VITALS
BODY MASS INDEX: 31.55 KG/M2 | DIASTOLIC BLOOD PRESSURE: 84 MMHG | TEMPERATURE: 97.6 F | SYSTOLIC BLOOD PRESSURE: 130 MMHG | OXYGEN SATURATION: 97 % | HEART RATE: 78 BPM | WEIGHT: 189.6 LBS

## 2022-12-15 DIAGNOSIS — I10 PRIMARY HYPERTENSION: ICD-10-CM

## 2022-12-15 DIAGNOSIS — E11.9 TYPE 2 DIABETES MELLITUS WITHOUT COMPLICATION, WITHOUT LONG-TERM CURRENT USE OF INSULIN (HCC): Primary | ICD-10-CM

## 2022-12-15 DIAGNOSIS — E78.00 PURE HYPERCHOLESTEROLEMIA: ICD-10-CM

## 2022-12-15 LAB — HBA1C MFR BLD: 5.9 % (ref 4.3–5.7)

## 2022-12-15 PROCEDURE — 3074F SYST BP LT 130 MM HG: CPT | Performed by: INTERNAL MEDICINE

## 2022-12-15 PROCEDURE — 99214 OFFICE O/P EST MOD 30 MIN: CPT | Performed by: INTERNAL MEDICINE

## 2022-12-15 PROCEDURE — 3078F DIAST BP <80 MM HG: CPT | Performed by: INTERNAL MEDICINE

## 2022-12-15 PROCEDURE — 3044F HG A1C LEVEL LT 7.0%: CPT | Performed by: INTERNAL MEDICINE

## 2022-12-15 PROCEDURE — 83036 HEMOGLOBIN GLYCOSYLATED A1C: CPT | Performed by: INTERNAL MEDICINE

## 2022-12-15 RX ORDER — FLASH GLUCOSE SENSOR
KIT MISCELLANEOUS
Qty: 6 EACH | Refills: 2 | Status: SHIPPED | OUTPATIENT
Start: 2022-12-15

## 2022-12-15 RX ORDER — DULAGLUTIDE 1.5 MG/.5ML
INJECTION, SOLUTION SUBCUTANEOUS
Qty: 12 ADJUSTABLE DOSE PRE-FILLED PEN SYRINGE | Refills: 1 | Status: SHIPPED | OUTPATIENT
Start: 2022-12-15

## 2022-12-15 RX ORDER — POTASSIUM CHLORIDE 750 MG/1
TABLET, EXTENDED RELEASE ORAL
COMMUNITY
Start: 2022-10-29

## 2022-12-15 SDOH — ECONOMIC STABILITY: FOOD INSECURITY: WITHIN THE PAST 12 MONTHS, THE FOOD YOU BOUGHT JUST DIDN'T LAST AND YOU DIDN'T HAVE MONEY TO GET MORE.: NEVER TRUE

## 2022-12-15 SDOH — ECONOMIC STABILITY: FOOD INSECURITY: WITHIN THE PAST 12 MONTHS, YOU WORRIED THAT YOUR FOOD WOULD RUN OUT BEFORE YOU GOT MONEY TO BUY MORE.: NEVER TRUE

## 2022-12-15 ASSESSMENT — SOCIAL DETERMINANTS OF HEALTH (SDOH): HOW HARD IS IT FOR YOU TO PAY FOR THE VERY BASICS LIKE FOOD, HOUSING, MEDICAL CARE, AND HEATING?: NOT HARD AT ALL

## 2022-12-15 NOTE — PROGRESS NOTES
[address] [facility] Internal Medicine   [facility] [address]  [address] [facility], [address] [facility]  Dept: [phone]  Dept Fax: [phone]        YOB: 1960    Chief Complaint   Patient presents with    Diabetes    Hypertension    Depression    Hyperlipidemia    Hypothyroidism       Patient presents for evaluation of DM-2   I have seen the patient previously, since then seen our diabetic clinic team.  This patient is followed regularly by Dr. Josselyn Rosario, from [facility]. Has a abel on we checked A1c its 5.9 , in the past it wsa 6 and 6.3 . No cp or SOB, no low sugars and no LOC. No recent hospitalizations , had CAD with stents followed by   [facility] cardiology. We are following with her DM and HTN. .  she is having low sugars, in the 70's, she is a pleasant WF not in distress. Due to technical issues we could not download the [place] but I did check her phone, see the graph , range over 15, 30 and 90 days . No LOC, or dizzy spells. Patient Active Problem List   Diagnosis    Hypothyroidism    Hyperlipemia    Metabolic syndrome    Menopause    Depression    Insulin resistance    Hypertension    Vitamin D deficiency    Restless leg syndrome    Daytime somnolence    Fatigue    Obesity (BMI 30-39. 9)    Obstructive sleep apnea on CPAP    Facial droop    Type 2 diabetes mellitus without complication (HCC)       Current Outpatient Medications   Medication Sig Dispense Refill    KLOR-CON M10 10 MEQ extended release tablet       dulaglutide (TRULICITY) 1.5 GN/3.8AK SC injection Inject 1.5 MG once weekly. 12 Adjustable Dose Pre-filled Pen Syringe 1    Continuous Blood Gluc Sensor (FREESTYLE ABEL 14 DAY SENSOR) MISC Apply 1 sensor every 14 days.  6 each 2    dapagliflozin (FARXIGA) 5 MG tablet Take 1 tablet by mouth every morning 30 tablet 4    cetirizine (ZYRTEC) 10 MG tablet Take 1 tablet by mouth daily 90 tablet 0    carbidopa-levodopa (SINEMET)  MG per tablet TAKE 1 TABLET NIGHTLY 90 tablet 3    CPAP Machine MISC by Does not apply route Please change CPAP pressure to 10-13 cm H20. Turn ramp off 1 each 0    metFORMIN (GLUCOPHAGE) 500 MG tablet Take 1 tablet in AM and 2 tablets in the evening (Patient taking differently: 500 mg Take 1 tablet in AM and 1 tablet in the evening.) 270 tablet 1    fluticasone (FLONASE) 50 MCG/ACT nasal spray 2 sprays by Each Nostril route daily 3 each 2    guaiFENesin (MUCINEX) 600 MG extended release tablet Take by mouth      Calcium Polycarbophil (FIBERCON PO) Take by mouth daily      buPROPion (WELLBUTRIN XL) 300 MG extended release tablet Take 300 mg by mouth daily      levothyroxine (SYNTHROID) 112 MCG tablet Take 1 tablet by mouth daily 90 tablet 3    Lancets MISC 1 each by Does not apply route daily 100 each 3    REPATHA SURECLICK 181 MG/ML SOAJ       Cranberry 1000 MG CAPS Take by mouth      levocetirizine (XYZAL) 5 MG tablet Take 5 mg by mouth nightly      clopidogrel (PLAVIX) 75 MG tablet Take 75 mg by mouth daily      aspirin 81 MG tablet Take 81 mg by mouth daily      potassium chloride (KLOR-CON) 10 MEQ extended release tablet Take 10 mEq by mouth daily At nighttime      isosorbide mononitrate (IMDUR) 30 MG extended release tablet Take 15 mg by mouth daily Take in the morning (1/2 tablet)      cycloSPORINE (RESTASIS) 0.05 % ophthalmic emulsion 1 drop 2 times daily      valsartan (DIOVAN) 80 MG tablet Take 80 mg by mouth daily      Respiratory Therapy Supplies (ADULT MASK) MISC Please do mask refit and provide mask of patient's choice. She is interested in Full face mask to minimize leaks. I recommend Ruchi view mask. 1 each 0    butalbital-acetaminophen-caffeine (FIORICET, ESGIC) -40 MG per tablet Take 1 tablet by mouth every 4 hours as needed for Headaches      Probiotic Product (PROBIOTIC DAILY PO) Take by mouth      artificial tears (ARTIFICIAL TEARS) OINT 4 times daily Gen Teal      gabapentin (NEURONTIN) 300 MG capsule Take 300 mg by mouth 4 times daily.  2 tablet in the morning, 1 at lunch, 1 at dinner, 2 at nighttime      atenolol (TENORMIN) 50 MG tablet Take 50 mg by mouth daily. Multiple Vitamin (MULTI-VITAMIN PO) Take  by mouth daily. B Complex Vitamins (VITAMIN B COMPLEX PO) Take  by mouth daily. calcium carbonate-vitamin D (CALTRATE) 600-400 MG-UNIT TABS per tab Take 1 tablet by mouth daily. Flaxseed, Linseed, (FLAXSEED OIL) 1000 MG CAPS Take  by mouth daily. No current facility-administered medications for this visit. Past Medical History:   Diagnosis Date    Anemia     Backache     Depression     Diabetes mellitus ([facility] [address]     Eczema     Fibromyalgia     Fracture     Hyperlipidemia     Hypertension     Hypothyroidism     Metabolic syndrome     Migraine     Sinusitis     Sleep apnea        Past Surgical History:   Procedure Laterality Date    ARM SURGERY      BONE GRAFT  2007    Dr. James Brittle placement 11/2018    CARPAL TUNNEL RELEASE      RIGHT and LEFT    CHOLECYSTECTOMY  2003    Dr. Daniel Santos ARTHROSCOPY      LEEP      OTHER SURGICAL HISTORY  2000    D&C Polyp Removal Dr. Marian Dixon       Allergies   Allergen Reactions    Promethazine Hcl Anaphylaxis    Phenylephrine      Other reaction(s): heart races    Aspirin Other (See Comments)     Hearing? Can take a baby aspirin.       Fluconazole     Lipitor [Atorvastatin Calcium] Other (See Comments)     Memory loss [id number]    Mepergan [Meperidine-Promethazine]     Motrin [Ibuprofen Micronized]     Viibryd [Vilazodone Hcl] Other (See Comments)    Vilazodone      Other reaction(s): Dementia    Adhesive Tape Rash     Some of them       Social History     Socioeconomic History    Marital status:      Spouse name: Not on file    Number of children: Not on file    Years of education: Not on file    Highest education level: Not on file   Occupational History    Not on file   Tobacco Use    Smoking status: Never    Smokeless tobacco: Never   Vaping Use    Vaping Use: Never used   Substance and Sexual Activity    Alcohol use:  Yes     Alcohol/week: 0.0 standard drinks     Comment: one glass every week or two    Drug use: No    Sexual activity: Yes   Other Topics Concern    Not on file   Social History Narrative    Not on file     Social Determinants of Health     Financial Resource Strain: Low Risk     Difficulty of Paying Living Expenses: Not hard at all   Food Insecurity: No Food Insecurity    Worried About Running Out of Food in the Last Year: Never true    Ran Out of Food in the Last Year: Never true   Transportation Needs: Not on file   Physical Activity: Not on file   Stress: Not on file   Social Connections: Not on file   Intimate Partner Violence: Not on file   Housing Stability: Not on file       Family History   Problem Relation Age of Onset    Arthritis Mother     High Blood Pressure Mother     Heart Disease Father     Stroke Father     Diabetes Sister     Ovarian Cancer Paternal Grandmother         unknown age       Review of Systems     See HPI    /84 (Site: Left Upper Arm)   Pulse 78   Temp 97.6 °F (36.4 °C)   Wt 189 lb 9.6 oz (86 kg)   LMP 11/04/2015   SpO2 97%   BMI 31.55 kg/m²     Physical Examination: General appearance - alert, well appearing, and in no distress and overweight  Mental status - alert, oriented to person, place, and time  Neck - supple, no significant adenopathy  Chest - clear to auscultation, no wheezes, rales or rhonchi, symmetric air entry  Heart - normal rate, regular rhythm, normal S1, S2, no murmurs, rubs, clicks or gallops  Abdomen - soft, nontender, nondistended, no masses or organomegaly  Neurological - alert, oriented, normal speech, no focal findings or movement disorder noted, Babinski sign negative  Musculoskeletal - no joint tenderness, deformity or swelling  Extremities - peripheral pulses normal, no pedal edema, no clubbing or cyanosis, Carina's sign negative bilaterally  Skin - normal coloration and turgor, no rashes, no suspicious skin lesions noted     I have reviewed recent diagnostic testing including labs      Diagnosis Orders   1. Type 2 diabetes mellitus without complication, without long-term current use of insulin (HCC)  POCT glycosylated hemoglobin (Hb A1C)      2. Primary hypertension        3. Pure hypercholesterolemia          DIABETIC FOOT EXAM    Was done last time. Orders Placed This Encounter   Procedures    POCT glycosylated hemoglobin (Hb A1C)       PLAN:    DM-2  Well controlled on the current regimen, on trulicity 1.5 mg per week and on glucophage, no active GI issues. Will obtain CMP in about a month she follows with   Dr. Rico Pradhan at [facility] for her CAD. Based on the current abel and A1c readings as outlined above , I am pleased with her   Blood sugar control. Pt was given education material   Re DM-2 . Pt claims she got labs at [facility] in June , currently not in our system. No change in meds. , cut down the farxiga to 5 mg daily     She was reminded about the annual eye exam,  dr. Viola Kat , Retinal specialist.     HTN is stable on diovan 80mg  BID and imdur. Dyslipidemia  on Repatha     RTO 4 months , and follow up with Nika Briscoe in [address]. Signed by : Aaron Paige M.D.     [address] [facility] Internal Medicine  [address] [facility], Critical access hospital [address]  [facility], [address] [place] [address] [facility]    Tel  [phone]  Fax [phone]

## 2023-01-17 ENCOUNTER — PHARMACY VISIT (OUTPATIENT)
Dept: INTERNAL MEDICINE CLINIC | Age: 63
End: 2023-01-17
Payer: COMMERCIAL

## 2023-01-17 ENCOUNTER — TELEPHONE (OUTPATIENT)
Dept: INTERNAL MEDICINE CLINIC | Age: 63
End: 2023-01-17

## 2023-01-17 VITALS
TEMPERATURE: 96.6 F | WEIGHT: 188.8 LBS | SYSTOLIC BLOOD PRESSURE: 120 MMHG | BODY MASS INDEX: 31.42 KG/M2 | DIASTOLIC BLOOD PRESSURE: 59 MMHG | HEART RATE: 80 BPM

## 2023-01-17 DIAGNOSIS — E11.9 TYPE 2 DIABETES MELLITUS WITHOUT COMPLICATION, WITHOUT LONG-TERM CURRENT USE OF INSULIN (HCC): Primary | ICD-10-CM

## 2023-01-17 PROCEDURE — G0108 DIAB MANAGE TRN  PER INDIV: HCPCS | Performed by: INTERNAL MEDICINE

## 2023-01-17 RX ORDER — EVOLOCUMAB 140 MG/ML
INJECTION, SOLUTION SUBCUTANEOUS
COMMUNITY
Start: 2019-09-17

## 2023-01-17 NOTE — PATIENT INSTRUCTIONS
Trial stopping the Farxiga 5mg to see if this decreases your lows and improves your migraines  -Call if your time in range falls below 70% or if your blood sugars are running higher    Try to portion out your containers of chocolate     3. Try to get more active by doing stretching with the PBS programming or getting back to the [facility] at least 2-3 times per week.      4. Talk to Dr. Jovany Graves about your thyroid levels, as these could be affecting your migraines

## 2023-01-17 NOTE — TELEPHONE ENCOUNTER
Dona Wilkes is concerned about a recent (w/ last 3 mo) worsening of her migraines, which are now more persistent. She will be following with her PCP/neurologist about this issue, but wonders if her thyroid levels could be checked early - could these be contributing? Latest Reference Range & Units 6/24/22 09:41   TSH 0.490 - 4.67 mcIU/mL 1.260       Please review pended orders.        Thank you,     Nanette Geller, PharmD, BCPS, [facility]  Internal Medicine Clinical Pharmacist  [phone]

## 2023-01-17 NOTE — PROGRESS NOTES
[facility]  [facility] [address] [facility], [address]  [phone] (phone)  [phone] (fax)    Patient ID: Guilherme Child 1960  Referring Provider: Dr. Russell Bain     Patient's name and  were verified. Subjective:    She presents for Her follow-up diabetic visit. She has type 2 diabetes mellitus. Home regimen includes: Biguanide, GLP-1 Agonist, and SGLT-2 inhibitors She is compliant most of the time. Assessment:     Lab Results   Component Value Date/Time    LABA1C 5.9 12/15/2022 01:57 PM    LABA1C 5.8 2020 04:02 AM    BUN 14 2022 09:52 AM    CREATININE 0.72 2022 09:52 AM     Vitals:    23 0806   BP: (!) 120/59   Pulse: 80   Temp: (!) 96.6 °F (35.9 °C)   Weight: 188 lb 12.8 oz (85.6 kg)     Wt Readings from Last 3 Encounters:   23 188 lb 12.8 oz (85.6 kg)   12/15/22 189 lb 9.6 oz (86 kg)   22 186 lb 12.8 oz (84.7 kg)     Ht Readings from Last 3 Encounters:   22 5' 5\" (1.651 m)   22 5' 5\" (1.651 m)   22 5' 5\" (1.651 m)       Diabetes Pharmacotherapy:  Farxiga 5mg daily  Trulicity 9.9CM weekly    Glucose Trends:   Current monitoring regimen: Freestyle Topher 2 CGM - checks 4+ times daily  Home blood sugar trends:    -V. High: 0%, High: 10%, Target: 88%, Low: 2%, V. Low: 0%  Any episodes of hypoglycemia?  yes - happens around 2/3am but does not wake up patient   -Does not treat when low at night since she remains asleep   -Treat other hypoglycemia with carbs + protein OR glucose tabs if driving    Lifestyle Factors:   Previous visit with dietician: yes - 2022  Current diet: B: cereal + milk OR oatmeal             L: occasionally skips, PB sandwich OR leftover if not skipped                       D: meat + veggie + carb +/- milk                       Snacks: chocolate chips (nearly continuous) - has tried using preportioned containers                        Beverages: unswt almond milk, 2% milk, water, tea, coffee (decaf and caf)  Current exercise:  ADLs with minimal exercise    Health Maintenance:  Eye exam current (within one year): yes Date: 8/30/22, Dr. Shelbie Grady Optometrist/ Dr. Lia Man seen 2022  Any history of foot problems? no  Foot exam up to date: yes Date: 12/2022, Dr. Alfaro [facility] up to date:    Pneumonia: yes   Influenza: yes  The 10-year ASCVD risk score (Frantz DK, et al., 2019) is: 11.3%   Last panel - LDL:   Lab Results   Component Value Date    LDLCALC 42 01/30/2020    LDLDIRECT 86 01/13/2022   Taking ASA:  Yes   Taking statin: No - statins previously not tolerated  Last microalbumin/creatinine ratio:   Microalbumin Creatinine Ratio   Date Value Ref Range Status   06/24/2022 < 10.2 0 - 30.0 mg/Gm Final   Taking ACE/ARB: Yes- valsartan  Depression screening completed 3/2022. Focus:   Diabetes Education. Last A1C: 5.9% (12/15/22). CGM TIR is at target at 88%, with frequent episodes of borderline lows. Vicki's chief complaint today is worsened/persistent migraines. Armstead Siemens is wondering about medication causes. We discussed potential causes, including thyroid levels and hypoglycemia. Given that Armstead Siemens does not have proteinuria and has frequent borderline lows, will stop Farxiga 5mg today. We also discussed increasing physcial activity. Curriculum Area Focus: Glucose checks/goals, Physical activity goals, and Hypoglycemia management  DSME PLAN:     Trial stopping the Farxiga 5mg to see if this decreases your lows and improves your migraines  -Call if your time in range falls below 70% or if your blood sugars are running higher    Try to portion out your containers of chocolate     3. Try to get more active by doing stretching with the PBS programming or getting back to the [facility] at least 2-3 times per week.      4. We will talk to Dr. Dalila Poole about your thyroid levels, as these could be affecting your migraines       Goals Addressed                   This Visit's Progress     30-45 grams carbohydrate per meal; 15-20 grams carb for a snack between meals   On track     chocolate chips --limit to 1 tablespoon, 3 times per day   Not on track     Exercise- 20 minutes daily--stretching/ physical therapy/ swimming   Not on track             Meter download, medications, PMH and nursing assessment reviewed. Rena Billingsley states She is willing to participate in this plan of care and verbalized understanding of all instructions provided. Teach back used to verify comprehension. Follow-up: 1.5 month dietician, 3 month DM Clinic RN    Total time involved in direct patient education: 60 minutes.      For Pharmacy Admin Tracking Only    Program: Medical Group  CPA in place:  Yes  Recommendation Provided To: Patient/Caregiver: 1 via In person  Intervention Detail: Discontinued Rx: 1, reason: Therapy De-escalation  Intervention Accepted By: Patient/Caregiver: 1  Gap Closed?: No   Time Spent (min): [address], PharmD, [facility]  Internal Medicine Clinical Pharmacist  [phone]

## 2023-01-19 DIAGNOSIS — E03.8 OTHER SPECIFIED HYPOTHYROIDISM: Primary | ICD-10-CM

## 2023-03-21 LAB
A/G RATIO: 1.9 (ref 1.5–2.5)
ABSOLUTE BASO #: 0 /CMM (ref 0–200)
ABSOLUTE EOS #: 200 /CMM (ref 0–500)
ABSOLUTE LYMPH #: 1500 /CMM (ref 1000–4800)
ABSOLUTE MONO #: 200 /CMM (ref 0–800)
ABSOLUTE NEUT #: 2500 /CMM (ref 1800–7700)
ALBUMIN SERPL-MCNC: 4 G/DL (ref 3.5–5)
ALP BLD-CCNC: 73 IU/L (ref 39–118)
ALT SERPL-CCNC: 13 IU/L (ref 10–40)
ANION GAP SERPL CALCULATED.3IONS-SCNC: 5 MMOL/L (ref 4–12)
AST SERPL-CCNC: 14 IU/L (ref 15–41)
BASOPHILS RELATIVE PERCENT: 1 % (ref 0–2)
BILIRUB SERPL-MCNC: 0.4 MG/DL (ref 0.2–1)
BUN BLDV-MCNC: 9 MG/DL (ref 7–20)
C-REACTIVE PROTEIN: < 0.5 MG/DL
CALCIUM SERPL-MCNC: 8.9 MG/DL (ref 8.8–10.5)
CHLORIDE BLD-SCNC: 105 MEQ/L (ref 101–111)
CO2: 30 MEQ/L (ref 21–32)
CREAT SERPL-MCNC: 0.58 MG/DL (ref 0.6–1.3)
CREATININE CLEARANCE: >60
CYCLIC CITRULLINATED PEPTIDE ANTIBODY IGG: 0.4 U/ML (ref 0–6.9)
DOUBLE STRANDED DNA AB, IGG: < 0.6 IU/ML
ENA ANTIBODIES SCREEN: < 0.1 RATIO
EOSINOPHILS RELATIVE PERCENT: 3.8 % (ref 0–6)
GLUCOSE: 119 MG/DL (ref 70–110)
HCT VFR BLD CALC: 38.3 % (ref 35–44)
HEMOGLOBIN: 13.2 GM/DL (ref 12–15)
LYMPHOCYTES RELATIVE PERCENT: 34 % (ref 15–45)
MCH RBC QN AUTO: 30.5 PG (ref 27.5–33)
MCHC RBC AUTO-ENTMCNC: 34.5 GM/DL (ref 33–36)
MCV RBC AUTO: 88.3 CU MIC (ref 80–97)
MONOCYTES RELATIVE PERCENT: 5.6 % (ref 2–10)
NEUTROPHILS RELATIVE PERCENT: 55.6 % (ref 40–70)
NUCLEATED RBCS: 0.1 /100 WBC
PDW BLD-RTO: 13.1 % (ref 12–16)
PLATELET # BLD: 169 TH/CMM (ref 150–400)
POTASSIUM SERPL-SCNC: 4.4 MEQ/L (ref 3.6–5)
RBC # BLD: 4.34 MIL/CMM (ref 4–5.1)
SEDIMENTATION RATE, ERYTHROCYTE: 1 MM/HR
SODIUM BLD-SCNC: 140 MEQ/L (ref 135–145)
TOTAL PROTEIN: 6.1 G/DL (ref 6.2–8)
TSH REFLEX: 1.19 MCIU/ML (ref 0.49–4.67)
VITAMIN D 25-HYDROXY: 41.3 NG/ML (ref 30–100)
WBC # BLD: 4.4 TH/CMM (ref 4.4–10.5)

## 2023-03-22 LAB
HCV AB: NONREACTIVE
HEPATITIS B SURFACE ANTIGEN: NONREACTIVE
RHEUMATOID FACTOR: NEGATIVE

## 2023-03-23 LAB — HLA B27: NORMAL

## 2023-03-23 NOTE — TELEPHONE ENCOUNTER
Patient is requesting a new prescription for Stephenson sensors due to going on Medicare March 1. Medicare is requiring a new script, she cannot transfer them.

## 2023-03-24 RX ORDER — FLASH GLUCOSE SENSOR
KIT MISCELLANEOUS
Qty: 6 EACH | Refills: 3 | Status: SHIPPED | OUTPATIENT
Start: 2023-03-24

## 2023-03-27 RX ORDER — LEVOTHYROXINE SODIUM 112 UG/1
112 TABLET ORAL DAILY
Qty: 90 TABLET | Refills: 3 | Status: SHIPPED | OUTPATIENT
Start: 2023-03-27 | End: 2024-03-21

## 2023-03-27 NOTE — TELEPHONE ENCOUNTER
Refill request received for Levothyroxine    Last ordered 3/23/22, disp #90, refill 3    Date of last visit 7/28/22  Date of next visit 5/18

## 2023-03-28 LAB — T-SPOT TB TEST: NEGATIVE

## 2023-03-31 ENCOUNTER — TELEPHONE (OUTPATIENT)
Dept: INTERNAL MEDICINE CLINIC | Age: 63
End: 2023-03-31

## 2023-03-31 NOTE — TELEPHONE ENCOUNTER
Received call from patient, Medicare Part B is denying coverage for dexcom sensors because patient is not testing blood sugars QID or prescribed insulin. Are you aware of any way to get around this?

## 2023-04-04 ENCOUNTER — TELEPHONE (OUTPATIENT)
Dept: INTERNAL MEDICINE CLINIC | Age: 63
End: 2023-04-04

## 2023-05-18 ENCOUNTER — OFFICE VISIT (OUTPATIENT)
Dept: INTERNAL MEDICINE CLINIC | Age: 63
End: 2023-05-18
Payer: MEDICARE

## 2023-05-18 VITALS
OXYGEN SATURATION: 96 % | HEART RATE: 84 BPM | SYSTOLIC BLOOD PRESSURE: 134 MMHG | WEIGHT: 197.2 LBS | DIASTOLIC BLOOD PRESSURE: 68 MMHG | RESPIRATION RATE: 18 BRPM | TEMPERATURE: 98 F | BODY MASS INDEX: 32.82 KG/M2

## 2023-05-18 DIAGNOSIS — E11.9 TYPE 2 DIABETES MELLITUS WITHOUT COMPLICATION, WITHOUT LONG-TERM CURRENT USE OF INSULIN (HCC): Primary | ICD-10-CM

## 2023-05-18 DIAGNOSIS — I10 PRIMARY HYPERTENSION: ICD-10-CM

## 2023-05-18 DIAGNOSIS — E66.9 OBESITY (BMI 30-39.9): ICD-10-CM

## 2023-05-18 LAB — HBA1C MFR BLD: 6.2 % (ref 4.3–5.7)

## 2023-05-18 PROCEDURE — G8427 DOCREV CUR MEDS BY ELIG CLIN: HCPCS | Performed by: INTERNAL MEDICINE

## 2023-05-18 PROCEDURE — 99214 OFFICE O/P EST MOD 30 MIN: CPT | Performed by: INTERNAL MEDICINE

## 2023-05-18 PROCEDURE — 1123F ACP DISCUSS/DSCN MKR DOCD: CPT | Performed by: INTERNAL MEDICINE

## 2023-05-18 PROCEDURE — 83036 HEMOGLOBIN GLYCOSYLATED A1C: CPT | Performed by: INTERNAL MEDICINE

## 2023-05-18 PROCEDURE — G8399 PT W/DXA RESULTS DOCUMENT: HCPCS | Performed by: INTERNAL MEDICINE

## 2023-05-18 PROCEDURE — G8417 CALC BMI ABV UP PARAM F/U: HCPCS | Performed by: INTERNAL MEDICINE

## 2023-05-18 PROCEDURE — 2022F DILAT RTA XM EVC RTNOPTHY: CPT | Performed by: INTERNAL MEDICINE

## 2023-05-18 PROCEDURE — 1090F PRES/ABSN URINE INCON ASSESS: CPT | Performed by: INTERNAL MEDICINE

## 2023-05-18 PROCEDURE — 3044F HG A1C LEVEL LT 7.0%: CPT | Performed by: INTERNAL MEDICINE

## 2023-05-18 PROCEDURE — 3017F COLORECTAL CA SCREEN DOC REV: CPT | Performed by: INTERNAL MEDICINE

## 2023-05-18 PROCEDURE — 3075F SYST BP GE 130 - 139MM HG: CPT | Performed by: INTERNAL MEDICINE

## 2023-05-18 PROCEDURE — 3078F DIAST BP <80 MM HG: CPT | Performed by: INTERNAL MEDICINE

## 2023-05-18 PROCEDURE — 1036F TOBACCO NON-USER: CPT | Performed by: INTERNAL MEDICINE

## 2023-05-18 RX ORDER — DULAGLUTIDE 1.5 MG/.5ML
INJECTION, SOLUTION SUBCUTANEOUS
Qty: 12 ADJUSTABLE DOSE PRE-FILLED PEN SYRINGE | Refills: 3 | Status: SHIPPED | OUTPATIENT
Start: 2023-05-18

## 2023-05-18 RX ORDER — LEVOTHYROXINE SODIUM 112 UG/1
112 TABLET ORAL DAILY
Qty: 90 TABLET | Refills: 3 | Status: SHIPPED | OUTPATIENT
Start: 2023-05-18 | End: 2024-05-12

## 2023-05-18 RX ORDER — CELECOXIB 200 MG/1
CAPSULE ORAL
COMMUNITY
Start: 2023-05-09

## 2023-05-18 SDOH — ECONOMIC STABILITY: HOUSING INSECURITY
IN THE LAST 12 MONTHS, WAS THERE A TIME WHEN YOU DID NOT HAVE A STEADY PLACE TO SLEEP OR SLEPT IN A SHELTER (INCLUDING NOW)?: NO

## 2023-05-18 SDOH — ECONOMIC STABILITY: INCOME INSECURITY: HOW HARD IS IT FOR YOU TO PAY FOR THE VERY BASICS LIKE FOOD, HOUSING, MEDICAL CARE, AND HEATING?: NOT HARD AT ALL

## 2023-05-18 SDOH — ECONOMIC STABILITY: FOOD INSECURITY: WITHIN THE PAST 12 MONTHS, YOU WORRIED THAT YOUR FOOD WOULD RUN OUT BEFORE YOU GOT MONEY TO BUY MORE.: NEVER TRUE

## 2023-05-18 SDOH — ECONOMIC STABILITY: FOOD INSECURITY: WITHIN THE PAST 12 MONTHS, THE FOOD YOU BOUGHT JUST DIDN'T LAST AND YOU DIDN'T HAVE MONEY TO GET MORE.: NEVER TRUE

## 2023-05-18 ASSESSMENT — PATIENT HEALTH QUESTIONNAIRE - PHQ9
7. TROUBLE CONCENTRATING ON THINGS, SUCH AS READING THE NEWSPAPER OR WATCHING TELEVISION: 0
9. THOUGHTS THAT YOU WOULD BE BETTER OFF DEAD, OR OF HURTING YOURSELF: 0
8. MOVING OR SPEAKING SO SLOWLY THAT OTHER PEOPLE COULD HAVE NOTICED. OR THE OPPOSITE, BEING SO FIGETY OR RESTLESS THAT YOU HAVE BEEN MOVING AROUND A LOT MORE THAN USUAL: 0
5. POOR APPETITE OR OVEREATING: 0
2. FEELING DOWN, DEPRESSED OR HOPELESS: 0
3. TROUBLE FALLING OR STAYING ASLEEP: 0
SUM OF ALL RESPONSES TO PHQ QUESTIONS 1-9: 0
4. FEELING TIRED OR HAVING LITTLE ENERGY: 0
SUM OF ALL RESPONSES TO PHQ QUESTIONS 1-9: 0
10. IF YOU CHECKED OFF ANY PROBLEMS, HOW DIFFICULT HAVE THESE PROBLEMS MADE IT FOR YOU TO DO YOUR WORK, TAKE CARE OF THINGS AT HOME, OR GET ALONG WITH OTHER PEOPLE: 0
SUM OF ALL RESPONSES TO PHQ QUESTIONS 1-9: 0
SUM OF ALL RESPONSES TO PHQ9 QUESTIONS 1 & 2: 0
SUM OF ALL RESPONSES TO PHQ QUESTIONS 1-9: 0
1. LITTLE INTEREST OR PLEASURE IN DOING THINGS: 0
6. FEELING BAD ABOUT YOURSELF - OR THAT YOU ARE A FAILURE OR HAVE LET YOURSELF OR YOUR FAMILY DOWN: 0

## 2023-05-18 NOTE — PROGRESS NOTES
[facility] Internal Medicine   [facility] [address]  [address] [facility], [address]  Dept: [phone]  Dept Fax: [phone]        YOB: 1960    Chief Complaint   Patient presents with    Diabetes     Last diabetic eye exam 2/24/23, Dr. Norm Walker  Diabetic foot exam due     Hyperlipidemia    Hypertension    Other     Recently diagnosed with ankylosing spondylitis and started on celebrex        Patient presents for evaluation of DM-2   I have seen the patient previously, since then seen our diabetic clinic team.  This patient is followed regularly by Dr. Cielo Iglesias, from [facility]. Has a abel on we lst  A1c 6.2 in the past it wsa 6 and 6.3 . No cp or SOB, no low sugars and no LOC. No recent hospitalizations , had CAD with stents followed by   [facility] cardiology. We are following with her DM and HTN. .  she is having low sugars, in the 70's, she is a pleasant WF not in distress. Recent eye exam on 2/23 , about 3 months ago. Hx of Sjogrens and Ankylosing spondylitis, recently diagnosed by Rheumatologist at [facility]. Recent sugars numbers are acceptable, she does not qualify for [facility], so she checks them about once a day. She just got medicare coverage. , so lots of change of coverage. Due to technical issues we could not download the [facility] but I did check her phone, see the graph , range over 15, 30 and 90 days . No LOC, or dizzy spells. Patient Active Problem List   Diagnosis    Hypothyroidism    Hyperlipemia    Metabolic syndrome    Menopause    Depression    Insulin resistance    Hypertension    Vitamin D deficiency    Restless leg syndrome    Daytime somnolence    Fatigue    Obesity (BMI 30-39. 9)    Obstructive sleep apnea on CPAP    Facial droop    Type 2 diabetes mellitus without complication (HCC)       Current Outpatient Medications   Medication Sig Dispense Refill    levothyroxine (SYNTHROID) 112 MCG tablet Take 1 tablet by mouth daily 90 tablet 3    Evolocumab (REPATHA) SOSY syringe

## 2023-05-23 DIAGNOSIS — E11.9 TYPE 2 DIABETES MELLITUS WITHOUT COMPLICATION, WITHOUT LONG-TERM CURRENT USE OF INSULIN (HCC): Primary | ICD-10-CM

## 2023-05-25 RX ORDER — GLUCOSAMINE HCL/CHONDROITIN SU 500-400 MG
CAPSULE ORAL
Qty: 100 STRIP | Refills: 3 | Status: SHIPPED | OUTPATIENT
Start: 2023-05-25

## 2023-05-31 DIAGNOSIS — E11.9 TYPE 2 DIABETES MELLITUS WITHOUT COMPLICATION, WITHOUT LONG-TERM CURRENT USE OF INSULIN (HCC): ICD-10-CM

## 2023-05-31 RX ORDER — GLUCOSAMINE HCL/CHONDROITIN SU 500-400 MG
CAPSULE ORAL
Qty: 100 STRIP | Refills: 3 | Status: SHIPPED | OUTPATIENT
Start: 2023-05-31

## 2023-05-31 RX ORDER — GLUCOSAMINE HCL/CHONDROITIN SU 500-400 MG
CAPSULE ORAL
Qty: 100 STRIP | Refills: 3 | Status: SHIPPED | OUTPATIENT
Start: 2023-05-31 | End: 2023-05-31 | Stop reason: SDUPTHER

## 2023-05-31 NOTE — TELEPHONE ENCOUNTER
Per pharm: Medicare scripts can't have and \"as needed\" in the sig- they will reject. Please take out and resend. \"As needed\" removed. Attached for you signature.

## 2023-07-18 DIAGNOSIS — G47.61 PLMD (PERIODIC LIMB MOVEMENT DISORDER): ICD-10-CM

## 2023-07-18 NOTE — TELEPHONE ENCOUNTER
Mare Payan called requesting a refill on the following medications:  Requested Prescriptions     Pending Prescriptions Disp Refills    carbidopa-levodopa (SINEMET)  MG per tablet 90 tablet 3     Sig: TAKE 1 TABLET NIGHTLY     Pharmacy verified:  .[facility] - [place], [place] - [facility] -  [phone]    Date of last visit: 09/19/2022  Date of next visit (if applicable): 8/89/3938    *pt has new insurance and her mail order pharmacy was changed d/t this reason from [facility] to [facility]. Patient has contacted the pharmacies and they are not able to transfer the scripts she had. Unfortunately she will be out of this med before they would be able to get a refill to her once they get the new script, so she is asking for a short term supply to be sent to her local [address] [place] [address].

## 2023-07-24 RX ORDER — FLUTICASONE PROPIONATE 50 MCG
2 SPRAY, SUSPENSION (ML) NASAL DAILY
Qty: 3 EACH | Refills: 2 | Status: SHIPPED | OUTPATIENT
Start: 2023-07-24

## 2023-07-25 NOTE — TELEPHONE ENCOUNTER
Pt will be out of her Sinemet before the Mail order supply will arrive. Can she get a short term script sent to local pharmacy?

## 2023-07-25 NOTE — TELEPHONE ENCOUNTER
This is not a duplicate request. Patient requested a short term supply of this med to last until she could get the one from the mail order pharmacy. If not able to do this, please f/u with the patient to advise.  Thank you

## 2023-09-08 ENCOUNTER — HOSPITAL ENCOUNTER (OUTPATIENT)
Dept: WOMENS IMAGING | Age: 63
Discharge: HOME | End: 2023-09-08
Payer: MEDICARE

## 2023-09-08 VITALS — WEIGHT: 195 LBS | BODY MASS INDEX: 32.49 KG/M2 | HEIGHT: 65 IN

## 2023-09-08 DIAGNOSIS — Z12.31 VISIT FOR SCREENING MAMMOGRAM: ICD-10-CM

## 2023-09-08 PROCEDURE — 77063 BREAST TOMOSYNTHESIS BI: CPT

## 2023-09-15 NOTE — PROGRESS NOTES
[facility]      Dali Reaves         [id number]  9/18/2023   Chief Complaint   Patient presents with    Follow-up     1yr DEBBIE f/u w/[facility] download. Doing well. Needs script for PAP supplies. Pt of Dr. Katarina Chong      PAP Download:   Original or initial AHI: 8.6     Date of initial study: 08/10/2017        Compliant  100%     Noncompliant 0 %     PAP Type APAP   Level  10/13 cmH2O   Avg Hrs/Day 8hrs 48mins  AHI: 2.7   Recorded compliance dates , 8/15/23  to 9/13/23   Machine/Mfg:   [x] ResMed    [] Respironics/Dreamstation   Interface:   [] Nasal    [] Nasal pillows   [x] FFM      Provider:      [] -HMROBYN     []Bessie     [] [employer]    [x] Cj Woodard   [] Dipti               [] [facility]      [] Adaptive    [] [address] [facility] [address]:      [] Other    Neck Size: 15.5  Mallampati 3  ESS:  13  SAQLI: 46    Here is a scan of the most recent download:            Presentation:   Jackelyn Lindsay presents for sleep medicine follow up for obstructive sleep apnea  Since the last visit, Jackelyn Lindsay is doing well with PAP. She getting benefit from Sinemet for RLS. She takes gabapentin for leg pain from neurology. She still gets tired at times. Equipment issues: The pressure is  acceptable, the mask is acceptable     Sleep issues:  Do you feel better? Yes  More rested? Yes   Better concentration? yes    Progress History:   Since last visit any new medical issues? Yes ankylosing spondylosis   New ER or hospital visits? No  Any new or changes in medicines? No  Any new sleep medicines? No    Review of Systems -   Review of Systems   Constitutional:  Negative for activity change, appetite change, chills and fever. HENT:  Negative for congestion and postnasal drip. Eyes: Negative. Respiratory:  Negative for cough, chest tightness, shortness of breath, wheezing and stridor. Cardiovascular:  Negative for chest pain and leg swelling.    Gastrointestinal:  Negative for

## 2023-09-18 ENCOUNTER — OFFICE VISIT (OUTPATIENT)
Dept: PULMONOLOGY | Age: 63
End: 2023-09-18
Payer: MEDICARE

## 2023-09-18 VITALS
SYSTOLIC BLOOD PRESSURE: 130 MMHG | TEMPERATURE: 98.1 F | BODY MASS INDEX: 33.92 KG/M2 | HEIGHT: 65 IN | OXYGEN SATURATION: 93 % | DIASTOLIC BLOOD PRESSURE: 62 MMHG | HEART RATE: 90 BPM | WEIGHT: 203.6 LBS

## 2023-09-18 DIAGNOSIS — G47.61 PLMD (PERIODIC LIMB MOVEMENT DISORDER): ICD-10-CM

## 2023-09-18 DIAGNOSIS — G47.33 OBSTRUCTIVE SLEEP APNEA ON CPAP: Primary | ICD-10-CM

## 2023-09-18 DIAGNOSIS — E66.9 OBESITY (BMI 30-39.9): ICD-10-CM

## 2023-09-18 DIAGNOSIS — Z99.89 OBSTRUCTIVE SLEEP APNEA ON CPAP: Primary | ICD-10-CM

## 2023-09-18 DIAGNOSIS — M79.7 FIBROMYALGIA: ICD-10-CM

## 2023-09-18 PROCEDURE — G8399 PT W/DXA RESULTS DOCUMENT: HCPCS | Performed by: PHYSICIAN ASSISTANT

## 2023-09-18 PROCEDURE — G8427 DOCREV CUR MEDS BY ELIG CLIN: HCPCS | Performed by: PHYSICIAN ASSISTANT

## 2023-09-18 PROCEDURE — 1090F PRES/ABSN URINE INCON ASSESS: CPT | Performed by: PHYSICIAN ASSISTANT

## 2023-09-18 PROCEDURE — 3078F DIAST BP <80 MM HG: CPT | Performed by: PHYSICIAN ASSISTANT

## 2023-09-18 PROCEDURE — 1036F TOBACCO NON-USER: CPT | Performed by: PHYSICIAN ASSISTANT

## 2023-09-18 PROCEDURE — 1123F ACP DISCUSS/DSCN MKR DOCD: CPT | Performed by: PHYSICIAN ASSISTANT

## 2023-09-18 PROCEDURE — G8417 CALC BMI ABV UP PARAM F/U: HCPCS | Performed by: PHYSICIAN ASSISTANT

## 2023-09-18 PROCEDURE — 99213 OFFICE O/P EST LOW 20 MIN: CPT | Performed by: PHYSICIAN ASSISTANT

## 2023-09-18 PROCEDURE — 3075F SYST BP GE 130 - 139MM HG: CPT | Performed by: PHYSICIAN ASSISTANT

## 2023-09-18 PROCEDURE — 3017F COLORECTAL CA SCREEN DOC REV: CPT | Performed by: PHYSICIAN ASSISTANT

## 2023-09-18 ASSESSMENT — ENCOUNTER SYMPTOMS
NAUSEA: 0
COUGH: 0
CHEST TIGHTNESS: 0
EYES NEGATIVE: 1
DIARRHEA: 0
BACK PAIN: 1
WHEEZING: 0
STRIDOR: 0
SHORTNESS OF BREATH: 0
ALLERGIC/IMMUNOLOGIC NEGATIVE: 1

## 2023-09-21 DIAGNOSIS — E11.9 TYPE 2 DIABETES MELLITUS WITHOUT COMPLICATION, WITHOUT LONG-TERM CURRENT USE OF INSULIN (HCC): Primary | ICD-10-CM

## 2023-09-21 RX ORDER — GLUCOSAMINE HCL/CHONDROITIN SU 500-400 MG
CAPSULE ORAL
Qty: 100 STRIP | Refills: 3 | Status: SHIPPED | OUTPATIENT
Start: 2023-09-21

## 2023-09-21 RX ORDER — BLOOD-GLUCOSE METER
1 EACH MISCELLANEOUS DAILY
Qty: 1 KIT | Refills: 0 | Status: SHIPPED | OUTPATIENT
Start: 2023-09-21

## 2023-09-21 NOTE — TELEPHONE ENCOUNTER
Anila Kendrick is not covered for patient and she is requesting a glucometer and strips- we tried to send in May but that one was not covered.

## 2023-10-19 ENCOUNTER — OFFICE VISIT (OUTPATIENT)
Dept: INTERNAL MEDICINE CLINIC | Age: 63
End: 2023-10-19
Payer: MEDICARE

## 2023-10-19 VITALS
RESPIRATION RATE: 18 BRPM | TEMPERATURE: 97.1 F | OXYGEN SATURATION: 97 % | WEIGHT: 205.2 LBS | BODY MASS INDEX: 34.15 KG/M2 | HEART RATE: 80 BPM | DIASTOLIC BLOOD PRESSURE: 78 MMHG | SYSTOLIC BLOOD PRESSURE: 140 MMHG

## 2023-10-19 DIAGNOSIS — I10 PRIMARY HYPERTENSION: ICD-10-CM

## 2023-10-19 DIAGNOSIS — G47.33 OBSTRUCTIVE SLEEP APNEA ON CPAP: ICD-10-CM

## 2023-10-19 DIAGNOSIS — E03.8 OTHER SPECIFIED HYPOTHYROIDISM: ICD-10-CM

## 2023-10-19 DIAGNOSIS — E11.9 TYPE 2 DIABETES MELLITUS WITHOUT COMPLICATION, WITHOUT LONG-TERM CURRENT USE OF INSULIN (HCC): Primary | ICD-10-CM

## 2023-10-19 LAB — HBA1C MFR BLD: 6.8 % (ref 4.3–5.7)

## 2023-10-19 PROCEDURE — G8484 FLU IMMUNIZE NO ADMIN: HCPCS | Performed by: INTERNAL MEDICINE

## 2023-10-19 PROCEDURE — 3077F SYST BP >= 140 MM HG: CPT | Performed by: INTERNAL MEDICINE

## 2023-10-19 PROCEDURE — 1090F PRES/ABSN URINE INCON ASSESS: CPT | Performed by: INTERNAL MEDICINE

## 2023-10-19 PROCEDURE — G8417 CALC BMI ABV UP PARAM F/U: HCPCS | Performed by: INTERNAL MEDICINE

## 2023-10-19 PROCEDURE — 83036 HEMOGLOBIN GLYCOSYLATED A1C: CPT | Performed by: INTERNAL MEDICINE

## 2023-10-19 PROCEDURE — 2022F DILAT RTA XM EVC RTNOPTHY: CPT | Performed by: INTERNAL MEDICINE

## 2023-10-19 PROCEDURE — 1036F TOBACCO NON-USER: CPT | Performed by: INTERNAL MEDICINE

## 2023-10-19 PROCEDURE — 1123F ACP DISCUSS/DSCN MKR DOCD: CPT | Performed by: INTERNAL MEDICINE

## 2023-10-19 PROCEDURE — 99213 OFFICE O/P EST LOW 20 MIN: CPT | Performed by: INTERNAL MEDICINE

## 2023-10-19 PROCEDURE — 3044F HG A1C LEVEL LT 7.0%: CPT | Performed by: INTERNAL MEDICINE

## 2023-10-19 PROCEDURE — G8427 DOCREV CUR MEDS BY ELIG CLIN: HCPCS | Performed by: INTERNAL MEDICINE

## 2023-10-19 PROCEDURE — 3017F COLORECTAL CA SCREEN DOC REV: CPT | Performed by: INTERNAL MEDICINE

## 2023-10-19 PROCEDURE — G8399 PT W/DXA RESULTS DOCUMENT: HCPCS | Performed by: INTERNAL MEDICINE

## 2023-10-19 PROCEDURE — 3078F DIAST BP <80 MM HG: CPT | Performed by: INTERNAL MEDICINE

## 2023-10-19 RX ORDER — ACETAMINOPHEN AND CODEINE PHOSPHATE 300; 30 MG/1; MG/1
TABLET ORAL
COMMUNITY
Start: 2023-08-09 | End: 2023-10-19

## 2023-10-19 RX ORDER — VALSARTAN 320 MG/1
TABLET ORAL
Qty: 90 TABLET | Refills: 1 | Status: SHIPPED | OUTPATIENT
Start: 2023-10-19

## 2023-10-19 NOTE — PROGRESS NOTES
carbidopa-levodopa (SINEMET)  MG per tablet TAKE 1 TABLET NIGHTLY 30 tablet 3    fluticasone (FLONASE) 50 MCG/ACT nasal spray 2 sprays by Each Nostril route daily 3 each 2    celecoxib (CELEBREX) 200 MG capsule TAKE 1 CAPSULE BY MOUTH ONCE DAILY WITH FOOD FOR 30 DAYS      levothyroxine (SYNTHROID) 112 MCG tablet Take 1 tablet by mouth daily 90 tablet 3    metFORMIN (GLUCOPHAGE) 500 MG tablet Take 1 tablet by mouth 2 times daily (with meals) Take 1 tablet in AM and 1 tablet in the evening. 180 tablet 1    dulaglutide (TRULICITY) 1.5 SL/4.4PE SC injection Inject 1.5 MG once weekly. 12 Adjustable Dose Pre-filled Pen Syringe 3    blood glucose monitor kit and supplies Dispense sufficient amount for indicated testing frequency plus additional to accommodate PRN testing needs. Dispense all needed supplies to include: monitor, strips, lancing device, lancets, control solutions, alcohol swabs. 1 kit 0    Evolocumab (REPATHA) SOSY syringe solution  subcutaneous every 2 weeks      KLOR-CON M10 10 MEQ extended release tablet       CPAP Machine MISC by Does not apply route Please change CPAP pressure to 10-13 cm H20. Turn ramp off 1 each 0    Calcium Polycarbophil (FIBERCON PO) Take by mouth as needed As needed when on vacation      buPROPion (WELLBUTRIN XL) 300 MG extended release tablet Take 1 tablet by mouth daily      Lancets MISC 1 each by Does not apply route daily 100 each 3    REPATHA SURECLICK 201 MG/ML SOAJ       Cranberry 1000 MG CAPS Take by mouth      levocetirizine (XYZAL) 5 MG tablet Take 1 tablet by mouth nightly      clopidogrel (PLAVIX) 75 MG tablet Take 1 tablet by mouth daily      aspirin 81 MG tablet Take 1 tablet by mouth daily      cycloSPORINE (RESTASIS) 0.05 % ophthalmic emulsion 1 drop 2 times daily      Respiratory Therapy Supplies (ADULT MASK) MISC Please do mask refit and provide mask of patient's choice. She is interested in Full face mask to minimize leaks. I recommend Ruchi view mask.  1

## 2024-02-15 LAB
T4 FREE: 1.03 NG/DL (ref 0.61–1.12)
TSH SERPL DL<=0.05 MIU/L-ACNC: 0.41 MCIU/ML (ref 0.49–4.67)

## 2024-02-22 ENCOUNTER — OFFICE VISIT (OUTPATIENT)
Dept: INTERNAL MEDICINE CLINIC | Age: 64
End: 2024-02-22
Payer: MEDICARE

## 2024-02-22 VITALS
HEART RATE: 70 BPM | SYSTOLIC BLOOD PRESSURE: 130 MMHG | OXYGEN SATURATION: 97 % | BODY MASS INDEX: 32.72 KG/M2 | WEIGHT: 196.6 LBS | DIASTOLIC BLOOD PRESSURE: 72 MMHG | TEMPERATURE: 98.2 F

## 2024-02-22 DIAGNOSIS — E11.9 TYPE 2 DIABETES MELLITUS WITHOUT COMPLICATION, UNSPECIFIED WHETHER LONG TERM INSULIN USE (HCC): Primary | ICD-10-CM

## 2024-02-22 DIAGNOSIS — I10 PRIMARY HYPERTENSION: ICD-10-CM

## 2024-02-22 PROBLEM — I25.10 CAD (CORONARY ARTERY DISEASE): Status: ACTIVE | Noted: 2024-02-22

## 2024-02-22 LAB — HBA1C MFR BLD: 7 % (ref 4.3–5.7)

## 2024-02-22 PROCEDURE — 83036 HEMOGLOBIN GLYCOSYLATED A1C: CPT | Performed by: INTERNAL MEDICINE

## 2024-02-22 PROCEDURE — G8417 CALC BMI ABV UP PARAM F/U: HCPCS | Performed by: INTERNAL MEDICINE

## 2024-02-22 PROCEDURE — 99213 OFFICE O/P EST LOW 20 MIN: CPT | Performed by: INTERNAL MEDICINE

## 2024-02-22 PROCEDURE — G8427 DOCREV CUR MEDS BY ELIG CLIN: HCPCS | Performed by: INTERNAL MEDICINE

## 2024-02-22 PROCEDURE — 3078F DIAST BP <80 MM HG: CPT | Performed by: INTERNAL MEDICINE

## 2024-02-22 PROCEDURE — 3051F HG A1C>EQUAL 7.0%<8.0%: CPT | Performed by: INTERNAL MEDICINE

## 2024-02-22 PROCEDURE — 1123F ACP DISCUSS/DSCN MKR DOCD: CPT | Performed by: INTERNAL MEDICINE

## 2024-02-22 PROCEDURE — G8399 PT W/DXA RESULTS DOCUMENT: HCPCS | Performed by: INTERNAL MEDICINE

## 2024-02-22 PROCEDURE — 3017F COLORECTAL CA SCREEN DOC REV: CPT | Performed by: INTERNAL MEDICINE

## 2024-02-22 PROCEDURE — 2022F DILAT RTA XM EVC RTNOPTHY: CPT | Performed by: INTERNAL MEDICINE

## 2024-02-22 PROCEDURE — 3075F SYST BP GE 130 - 139MM HG: CPT | Performed by: INTERNAL MEDICINE

## 2024-02-22 PROCEDURE — 1090F PRES/ABSN URINE INCON ASSESS: CPT | Performed by: INTERNAL MEDICINE

## 2024-02-22 PROCEDURE — G8484 FLU IMMUNIZE NO ADMIN: HCPCS | Performed by: INTERNAL MEDICINE

## 2024-02-22 PROCEDURE — 1036F TOBACCO NON-USER: CPT | Performed by: INTERNAL MEDICINE

## 2024-02-22 RX ORDER — PREDNISOLONE ACETATE 10 MG/ML
SUSPENSION/ DROPS OPHTHALMIC
COMMUNITY
Start: 2024-01-22 | End: 2024-02-22 | Stop reason: ALTCHOICE

## 2024-02-22 RX ORDER — VALSARTAN 160 MG/1
160 TABLET ORAL 2 TIMES DAILY
COMMUNITY
Start: 2024-01-16

## 2024-02-22 RX ORDER — IMMUNE GLOBULIN INFUSION (HUMAN) 100 MG/ML
40 INJECTION, SOLUTION INTRAVENOUS; SUBCUTANEOUS
COMMUNITY
Start: 2023-11-30 | End: 2024-11-29

## 2024-02-22 RX ORDER — LEVOTHYROXINE SODIUM 112 UG/1
112 TABLET ORAL DAILY
Qty: 90 TABLET | Refills: 3 | Status: SHIPPED | OUTPATIENT
Start: 2024-02-22 | End: 2025-02-16

## 2024-02-22 ASSESSMENT — PATIENT HEALTH QUESTIONNAIRE - PHQ9
10. IF YOU CHECKED OFF ANY PROBLEMS, HOW DIFFICULT HAVE THESE PROBLEMS MADE IT FOR YOU TO DO YOUR WORK, TAKE CARE OF THINGS AT HOME, OR GET ALONG WITH OTHER PEOPLE: 0
4. FEELING TIRED OR HAVING LITTLE ENERGY: 0
8. MOVING OR SPEAKING SO SLOWLY THAT OTHER PEOPLE COULD HAVE NOTICED. OR THE OPPOSITE, BEING SO FIGETY OR RESTLESS THAT YOU HAVE BEEN MOVING AROUND A LOT MORE THAN USUAL: 0
SUM OF ALL RESPONSES TO PHQ QUESTIONS 1-9: 0
7. TROUBLE CONCENTRATING ON THINGS, SUCH AS READING THE NEWSPAPER OR WATCHING TELEVISION: 0
9. THOUGHTS THAT YOU WOULD BE BETTER OFF DEAD, OR OF HURTING YOURSELF: 0
1. LITTLE INTEREST OR PLEASURE IN DOING THINGS: 0
3. TROUBLE FALLING OR STAYING ASLEEP: 0
SUM OF ALL RESPONSES TO PHQ9 QUESTIONS 1 & 2: 0
SUM OF ALL RESPONSES TO PHQ QUESTIONS 1-9: 0
6. FEELING BAD ABOUT YOURSELF - OR THAT YOU ARE A FAILURE OR HAVE LET YOURSELF OR YOUR FAMILY DOWN: 0
SUM OF ALL RESPONSES TO PHQ QUESTIONS 1-9: 0
SUM OF ALL RESPONSES TO PHQ QUESTIONS 1-9: 0
5. POOR APPETITE OR OVEREATING: 0
2. FEELING DOWN, DEPRESSED OR HOPELESS: 0

## 2024-02-22 NOTE — PROGRESS NOTES
[facility] Internal Medicine   [address]  [place] [address]  Dept: [phone]  Dept Fax: [phone]        YOB: 1960    Chief Complaint   Patient presents with    Diabetes    Hypertension    Sleep Apnea       Patient presents for evaluation of DM-2   I have seen the patient previously, since then seen our diabetic clinic team.    no cp or SOB, no low sugars and no LOC. No recent hospitalizations , had CAD with stents followed by dr. Roper at   [facility] cardiology.  Also had nuclear stress recently which was negative at [facility]. We are following with her DM and HTN..  she is having low sugars, in the 70's, she is a pleasant WF not in distress. Recent eye exam on 2/23 , about 3 months ago. Hx of Sjogrens and Ankylosing spondylitis, recently diagnosed by Rheumatologist at [facility]. Recent sugars numbers are acceptable, she does not qualify for abel, so she checks them about once a day.        She  did bring in her blood sugar log running anywhere from 1 30-1 80, and she does have a One Touch ultra -2 .  Patient admits  to being compliant with her medications. Admits to being laxed with her diet   Intake and does not check sugars regularly.    She also some weight with a new diet, about 10lbs  Less. Currently taking glucophage and Truicity 1.5 mg shots weekly, admits to taking the latter . She takes Reptha every other week due to high lipids.   Could not tolerate statins.       Hemoglobin A1C POC   Date Value Ref Range Status   02/22/2024 7.0 (H) 4.3 - 5.7 % Final     Comment:     Performed at [facility] Office under CLIA #  [id number]         Patient Active Problem List   Diagnosis    Hypothyroidism    Hyperlipemia    Metabolic syndrome    Menopause    Depression    Insulin resistance    Hypertension    Vitamin D deficiency    Restless leg syndrome    Daytime somnolence    Fatigue    Obesity (BMI 30-39.9)    Obstructive sleep apnea on CPAP    Facial droop    Type 2 diabetes mellitus

## 2024-04-12 ENCOUNTER — PATIENT MESSAGE (OUTPATIENT)
Dept: INTERNAL MEDICINE CLINIC | Age: 64
End: 2024-04-12

## 2024-04-15 NOTE — TELEPHONE ENCOUNTER
From: Vicki Solano  To: Dr. Navin Pereira  Sent: 4/12/2024 4:31 PM EDT  Subject: Trulicity    I am out of Trulicity and can't get it. It is back-ordered. I missed my dose yesterday and I don't know when any will be available again. Do I need to change any of my other medicines to compensate for the lack of Trulicity?    Vicki Solano

## 2024-04-15 NOTE — TELEPHONE ENCOUNTER
Amy,     Could you please make any recommendations for [place] meds should she need to adjust?     Thank you

## 2024-04-23 NOTE — TELEPHONE ENCOUNTER
Dr. Pereira is out of office; signed order per CPA.     Called Vicki to notify.     Scheduled follow-up with dietician per patient request.       For Pharmacy Admin Tracking Only    Program: Medical Group  CPA in place:  Yes  Recommendation Provided To: Patient/Caregiver: 2 via Telephone  Intervention Detail: Refill(s) Provided and Scheduled Appointment  Intervention Accepted By: Patient/Caregiver: 2  Gap Closed?: No   Time Spent (min): 10        Amy Choudhary PharmD, BCPS, [facility]  Internal Medicine Clinical Pharmacist  [phone]

## 2024-04-29 ENCOUNTER — OFFICE VISIT (OUTPATIENT)
Dept: INTERNAL MEDICINE CLINIC | Age: 64
End: 2024-04-29

## 2024-04-29 VITALS — WEIGHT: 202 LBS | BODY MASS INDEX: 33.61 KG/M2

## 2024-04-29 DIAGNOSIS — E11.9 TYPE 2 DIABETES MELLITUS WITHOUT COMPLICATION, UNSPECIFIED WHETHER LONG TERM INSULIN USE (HCC): Primary | ICD-10-CM

## 2024-04-29 NOTE — PROGRESS NOTES
[facility]  [facility]  [address] [place] [address]  [phone] (phone)  [phone] (fax)    Patient Name: Vicki Solano. Date of Birth: [id number]. MRN: [id number]      Assessment: Patient is a 64 y.o. female seen for follow-up MNT visit for Diabetes.     -Nutritionally relevant labs:   Lab Results   Component Value Date/Time    LABA1C 7.0 (H) 02/22/2024 01:34 PM    LABA1C 6.8 (H) 10/19/2023 12:48 PM    LABA1C 6.2 (H) 05/18/2023 01:51 PM    LABA1C 5.8 01/30/2020 04:02 AM    LABA1C 6.5 (H) 01/22/2014 10:23 AM    LABA1C 6.3 02/06/2012 09:39 AM    GLUCOSE 160 (H) 01/09/2024 09:18 AM    GLUCOSE 119 (H) 03/21/2023 09:10 AM    CHOL 129 01/09/2024 09:18 AM    CHOL 111 01/30/2020 04:02 AM    HDL 41 01/09/2024 09:18 AM    LDLCALC 42 01/30/2020 04:02 AM    TRIG 273 (H) 01/09/2024 09:18 AM     -Blood sugar trends: checking 4-5 x /day due to elevated bld sugars. -216 mg/dL, Then random checks after her fating bld sugar check. 130 -230s mg/dL.     DB meds:  Truliciy - 1.5 mg weekly  Metformin 1000 mg - am & 500 tablets pm    - Exercising : water aerobics twice a week.     -Food recall:   Breakfast: 1.5 - 3 Tb seeds (sunflower or pumpkin), 1/2 c carrots or Apples.    Am snack: 1/2 banana, 1 cup berries and few frozen peaches, 1/2 spinach, 1 cup water, 1 Tb olive oil, protein protein   Lunch: skips.   Afternoon snack: chocolate chips/nuts  Dinner: meat, starch, veggies.   Snacks: varies    -Main Beverages: water  -  Current Outpatient Medications on File Prior to Visit   Medication Sig Dispense Refill    dulaglutide (TRULICITY) 1.5 MG/0.5ML SC injection Inject 0.5 mLs into the skin once a week 4 Adjustable Dose Pre-filled Pen Syringe 1    Immune Globulin, Human, (GAMMAGARD) 30 GM/300ML SOLN Infuse 40 g intravenously Every 28 days      valsartan (DIOVAN) 160 MG tablet Take 1 tablet by mouth 2 times daily      metFORMIN (GLUCOPHAGE) 500 MG tablet Take

## 2024-04-29 NOTE — PATIENT INSTRUCTIONS
[email or web address] (healthy smoothies)     Limit to 1 oz dark chocolate or TB dark chocolate chips.

## 2024-05-08 DIAGNOSIS — G47.61 PLMD (PERIODIC LIMB MOVEMENT DISORDER): ICD-10-CM

## 2024-05-08 NOTE — TELEPHONE ENCOUNTER
Received fax from [facility] stating that patient needs refills on the loaded medication. Patient has a scheduled follow up for the corresponding specialty. The requested medication(s) is/are due for refills. Please advise, thanks!

## 2024-05-21 ENCOUNTER — HOSPITAL ENCOUNTER (EMERGENCY)
Age: 64
Discharge: HOME | End: 2024-05-21
Payer: MEDICARE

## 2024-05-21 VITALS
RESPIRATION RATE: 18 BRPM | SYSTOLIC BLOOD PRESSURE: 131 MMHG | HEART RATE: 84 BPM | DIASTOLIC BLOOD PRESSURE: 79 MMHG | OXYGEN SATURATION: 95 % | TEMPERATURE: 97.9 F

## 2024-05-21 DIAGNOSIS — J01.40 ACUTE PANSINUSITIS, RECURRENCE NOT SPECIFIED: Primary | ICD-10-CM

## 2024-05-21 PROCEDURE — 99214 OFFICE O/P EST MOD 30 MIN: CPT | Performed by: NURSE PRACTITIONER

## 2024-05-21 PROCEDURE — 99213 OFFICE O/P EST LOW 20 MIN: CPT

## 2024-05-21 RX ORDER — AMOXICILLIN AND CLAVULANATE POTASSIUM 875; 125 MG/1; MG/1
1 TABLET, FILM COATED ORAL 2 TIMES DAILY
Qty: 14 TABLET | Refills: 0 | Status: SHIPPED | OUTPATIENT
Start: 2024-05-21 | End: 2024-05-28

## 2024-05-21 RX ORDER — PREDNISONE 20 MG/1
20 TABLET ORAL DAILY
Qty: 5 TABLET | Refills: 0 | Status: SHIPPED | OUTPATIENT
Start: 2024-05-21 | End: 2024-05-26

## 2024-05-21 ASSESSMENT — ENCOUNTER SYMPTOMS
SINUS PAIN: 1
COUGH: 1
NAUSEA: 0
VOMITING: 0
CHEST TIGHTNESS: 0
DIARRHEA: 0
SORE THROAT: 1
RHINORRHEA: 1
SHORTNESS OF BREATH: 0
SINUS PRESSURE: 1

## 2024-05-21 ASSESSMENT — PAIN DESCRIPTION - LOCATION: LOCATION: HEAD

## 2024-05-21 ASSESSMENT — PAIN - FUNCTIONAL ASSESSMENT: PAIN_FUNCTIONAL_ASSESSMENT: 0-10

## 2024-05-21 ASSESSMENT — PAIN SCALES - GENERAL: PAINLEVEL_OUTOF10: 8

## 2024-05-21 NOTE — ED TRIAGE NOTES
\"Sinus and ear infection\", and has had diarrhea since 5/2 and then the following week started with sinus problem, is getting immunoglobulin shots form immuninologist , still having watery stool and much gas, has had much coughing and been sleeping in lazy boy

## 2024-05-21 NOTE — ED PROVIDER NOTES
(2007); Dilation and curettage of uterus; Carpal tunnel release; LEEP; Arm Surgery; Knee arthroscopy; and Cardiac catheterization.    CURRENT MEDICATIONS       Discharge Medication List as of 5/21/2024  2:35 PM        CONTINUE these medications which have NOT CHANGED    Details   carbidopa-levodopa (SINEMET)  MG per tablet TAKE 1 TABLET NIGHTLY, Disp-30 tablet, R-3Normal      dulaglutide (TRULICITY) 1.5 MG/0.5ML SC injection Inject 0.5 mLs into the skin once a week, Disp-4 Adjustable Dose Pre-filled Pen Syringe, R-1Normal      Immune Globulin, Human, (GAMMAGARD) 30 GM/300ML SOLN Infuse 40 g intravenously Every 28 daysHistorical Med      valsartan (DIOVAN) 160 MG tablet Take 1 tablet by mouth 2 times dailyHistorical Med      metFORMIN (GLUCOPHAGE) 500 MG tablet Take two tablets in AM and one tab evening, Disp-270 tablet, R-2Normal      levothyroxine (SYNTHROID) 112 MCG tablet Take 1 tablet by mouth daily, Disp-90 tablet, R-3Normal      Blood Glucose Monitoring Suppl (ONE TOUCH ULTRA 2) w/Device KIT DAILY Starting Thu 9/21/2023, Disp-1 kit, R-0, Normal      blood glucose monitor strips Test blood sugar 1 time a day, Disp-100 strip, R-3, Normal      fluticasone (FLONASE) 50 MCG/ACT nasal spray 2 sprays by Each Nostril route daily, Disp-3 each, R-2Normal      celecoxib (CELEBREX) 200 MG capsule TAKE 1 CAPSULE BY MOUTH ONCE DAILY WITH FOOD FOR 30 DAYSHistorical Med      blood glucose monitor kit and supplies Dispense sufficient amount for indicated testing frequency plus additional to accommodate PRN testing needs. Dispense all needed supplies to include: monitor, strips, lancing device, lancets, control solutions, alcohol swabs., Disp-1 kit, R-0, Normal      Evolocumab (REPATHA) SOSY syringe solution  subcutaneous every 2 weeksHistorical Med      KLOR-CON M10 10 MEQ extended release tablet DAWHistorical Med      CPAP Machine MISC Starting Mon 9/19/2022, Disp-1 each, R-0, PrintPlease change CPAP pressure to 10-13  Assessment consistent with Acute Pansinusitis.  Patient is provided a prescription for Augmentin and prednisone.  Instructed to use Zyrtec, Flonase, and Mucinex D.   Drink plenty of fluids to thin mucus.  Sleep with your head propped up on a pillow.  Inhale steam three of four times daily( exp: sit in bathroom with hot shower running.)  Avoid Trigger and not smoking.  May also clean nasal passages with salt water to ease congestion. The Patient is instructed to use over-the-counter Tylenol and Motrin for pain or fever.  Instructed to follow-up with their PCP or [facility] in 3 to 5 days and worsening symptoms.  The patient is agreeable with the above plan and denies questions or concerns at this time.      PATIENT REFERRED TO:  Mitchel Peralta III, MD  [address] / [place] [address]      DISCHARGE MEDICATIONS:  Discharge Medication List as of 5/21/2024  2:35 PM        START taking these medications    Details   predniSONE (DELTASONE) 20 MG tablet Take 1 tablet by mouth daily for 5 days, Disp-5 tablet, R-0Normal      amoxicillin-clavulanate (AUGMENTIN) 875-125 MG per tablet Take 1 tablet by mouth 2 times daily for 7 days, Disp-14 tablet, R-0Normal             Discharge Medication List as of 5/21/2024  2:35 PM          Discharge Medication List as of 5/21/2024  2:35 PM          JAMES Dobbins CNP    (Please note that portions of this note were completed with a voice recognition program. Efforts were made to edit the dictations but occasionally words are mis-transcribed.)           Nixon Lilly APRN - CNP  05/21/24 [id number]

## 2024-06-03 ENCOUNTER — HOSPITAL ENCOUNTER (EMERGENCY)
Age: 64
Discharge: HOME | End: 2024-06-03
Payer: MEDICARE

## 2024-06-03 VITALS
DIASTOLIC BLOOD PRESSURE: 75 MMHG | TEMPERATURE: 98.5 F | HEART RATE: 78 BPM | WEIGHT: 201.2 LBS | SYSTOLIC BLOOD PRESSURE: 116 MMHG | OXYGEN SATURATION: 98 % | RESPIRATION RATE: 16 BRPM | BODY MASS INDEX: 33.48 KG/M2

## 2024-06-03 DIAGNOSIS — J40 BRONCHITIS: Primary | ICD-10-CM

## 2024-06-03 PROCEDURE — 99214 OFFICE O/P EST MOD 30 MIN: CPT | Performed by: NURSE PRACTITIONER

## 2024-06-03 PROCEDURE — 99213 OFFICE O/P EST LOW 20 MIN: CPT

## 2024-06-03 RX ORDER — PREDNISONE 20 MG/1
40 TABLET ORAL DAILY
Qty: 14 TABLET | Refills: 0 | Status: SHIPPED | OUTPATIENT
Start: 2024-06-03 | End: 2024-06-10

## 2024-06-03 RX ORDER — AZITHROMYCIN 250 MG/1
TABLET, FILM COATED ORAL
Qty: 1 PACKET | Refills: 0 | Status: SHIPPED | OUTPATIENT
Start: 2024-06-03 | End: 2024-06-07

## 2024-06-03 ASSESSMENT — ENCOUNTER SYMPTOMS
SINUS PRESSURE: 1
VOMITING: 0
CHEST TIGHTNESS: 1
SHORTNESS OF BREATH: 0
SORE THROAT: 0
RHINORRHEA: 1
NAUSEA: 0
SINUS PAIN: 1
COUGH: 1
DIARRHEA: 0

## 2024-06-03 ASSESSMENT — PAIN - FUNCTIONAL ASSESSMENT
PAIN_FUNCTIONAL_ASSESSMENT: 0-10
PAIN_FUNCTIONAL_ASSESSMENT: ACTIVITIES ARE NOT PREVENTED

## 2024-06-03 ASSESSMENT — PAIN SCALES - GENERAL: PAINLEVEL_OUTOF10: 6

## 2024-06-03 ASSESSMENT — PAIN DESCRIPTION - LOCATION: LOCATION: FACE

## 2024-06-03 ASSESSMENT — PAIN DESCRIPTION - FREQUENCY: FREQUENCY: INTERMITTENT

## 2024-06-03 NOTE — ED TRIAGE NOTES
Vicki arrives to room with complaint of  pressure and pain in right side of face for past month, chest congestion with trouble taking a full breath for past week.       Taking tylenol, last dose 3 am this morning

## 2024-06-03 NOTE — ED PROVIDER NOTES
[facility]  Urgent Care Encounter       CHIEF COMPLAINT       Chief Complaint   Patient presents with    sinus pressure       Nurses Notes reviewed and I agree except as noted in the HPI.  HISTORY OF PRESENT ILLNESS   Vicki Solano is a 64 y.o. female who presents to the [facility] urgent care for evaluation of sinus congestion and cough.  She reports that the sinus concerns have been ongoing for roughly 1 month.  She is noted to be treated here on 5/21/2024 and prescribed Augmentin and prednisone.  She reports that the symptoms are not improving, but then she developed cough with chest tightness.  She does report dyspnea on exertion.    The history is provided by the patient. No  was used.       REVIEW OF SYSTEMS     Review of Systems   Constitutional:  Negative for activity change, appetite change, chills, fatigue and fever.   HENT:  Positive for congestion, rhinorrhea, sinus pressure and sinus pain. Negative for ear discharge, ear pain and sore throat.    Respiratory:  Positive for cough and chest tightness. Negative for shortness of breath.    Cardiovascular:  Negative for chest pain.   Gastrointestinal:  Negative for diarrhea, nausea and vomiting.   Genitourinary:  Negative for dysuria.   Skin:  Negative for rash.   Allergic/Immunologic: Negative for environmental allergies and food allergies.   Neurological:  Negative for dizziness and headaches.       PAST MEDICAL HISTORY         Diagnosis Date    Anemia     Backache     Depression     Diabetes mellitus (HCC)     Eczema     Fibromyalgia     Fracture     Hyperlipidemia     Hypertension     Hypothyroidism     Metabolic syndrome     Migraine     Sinusitis     Sleep apnea        SURGICALHISTORY     Patient  has a past surgical history that includes Tonsillectomy (1962); Cholecystectomy (2003); turbinate resection (1991); other surgical history (2000); bone graft (2007); Dilation and curettage of uterus; Carpal

## 2024-06-04 ENCOUNTER — TELEPHONE (OUTPATIENT)
Dept: PULMONOLOGY | Age: 64
End: 2024-06-04

## 2024-06-04 NOTE — TELEPHONE ENCOUNTER
Patient called in stating she needs a 90 day supply of her carbidopa-levodopa (SINEMET) sent in to [facility] instead of the 30 day supply that was sent. Please advisee, thank you

## 2024-06-04 NOTE — TELEPHONE ENCOUNTER
Patient called in stating she needs a 90 day supply of her carbidopa-levodopa (SINEMET) sent in to [facility] instead of the 30 day supply that was sent. Please advisee, thank you

## 2024-06-05 DIAGNOSIS — G47.61 PLMD (PERIODIC LIMB MOVEMENT DISORDER): ICD-10-CM

## 2024-06-05 RX ORDER — DULAGLUTIDE 1.5 MG/.5ML
INJECTION, SOLUTION SUBCUTANEOUS
Qty: 4 ML | Refills: 2 | Status: SHIPPED | OUTPATIENT
Start: 2024-06-05

## 2024-06-26 LAB
ANION GAP SERPL CALCULATED.3IONS-SCNC: 2 MMOL/L (ref 4–12)
BASOPHILS ABSOLUTE: 0 /CMM (ref 0–200)
BASOPHILS RELATIVE PERCENT: 0.7 % (ref 0–2)
BUN BLDV-MCNC: 11 MG/DL (ref 7–20)
CALCIUM SERPL-MCNC: 9.5 MG/DL (ref 8.8–10.5)
CHLORIDE BLD-SCNC: 107 MEQ/L (ref 101–111)
CO2: 29 MEQ/L (ref 21–32)
CREAT SERPL-MCNC: 0.66 MG/DL (ref 0.6–1.3)
CREATININE CLEARANCE: >60
EOSINOPHILS ABSOLUTE: 100 /CMM (ref 0–500)
EOSINOPHILS RELATIVE PERCENT: 2.1 % (ref 0–6)
ESTIMATED AVERAGE GLUCOSE: 169 MG/DL
GLUCOSE: 158 MG/DL (ref 70–110)
HBA1C MFR BLD: 7.5 % (ref 4.4–6.4)
HCT VFR BLD CALC: 40.8 % (ref 35–44)
HEMOGLOBIN: 13.4 GM/DL (ref 12–15)
LYMPHOCYTES ABSOLUTE: 1200 /CMM (ref 1000–4800)
LYMPHOCYTES RELATIVE PERCENT: 29.2 % (ref 15–45)
MCH RBC QN AUTO: 30.2 PG (ref 27.5–33)
MCHC RBC AUTO-ENTMCNC: 32.8 GM/DL (ref 33–36)
MCV RBC AUTO: 91.9 CU MIC (ref 80–97)
MONOCYTES ABSOLUTE: 300 /CMM (ref 0–800)
MONOCYTES RELATIVE PERCENT: 7.9 % (ref 2–10)
NEUTROPHILS ABSOLUTE: 2500 /CMM (ref 1800–7700)
NEUTROPHILS RELATIVE PERCENT: 60.1 % (ref 40–70)
NUCLEATED RBCS: 0.2 /100 WBC
PDW BLD-RTO: 13.5 % (ref 12–16)
PLATELET # BLD: 179 TH/CMM (ref 150–400)
POTASSIUM SERPL-SCNC: 4.3 MEQ/L (ref 3.6–5)
RBC # BLD: 4.44 MIL/CMM (ref 4–5.1)
SODIUM BLD-SCNC: 138 MEQ/L (ref 135–145)
WBC # BLD: 4.1 TH/CMM (ref 4.4–10.5)

## 2024-07-02 ENCOUNTER — OFFICE VISIT (OUTPATIENT)
Dept: INTERNAL MEDICINE CLINIC | Age: 64
End: 2024-07-02
Payer: MEDICARE

## 2024-07-02 VITALS
RESPIRATION RATE: 18 BRPM | WEIGHT: 199 LBS | OXYGEN SATURATION: 98 % | SYSTOLIC BLOOD PRESSURE: 126 MMHG | TEMPERATURE: 98.2 F | BODY MASS INDEX: 33.12 KG/M2 | DIASTOLIC BLOOD PRESSURE: 70 MMHG | HEART RATE: 84 BPM

## 2024-07-02 DIAGNOSIS — E11.9 TYPE 2 DIABETES MELLITUS WITHOUT COMPLICATION, WITHOUT LONG-TERM CURRENT USE OF INSULIN (HCC): Primary | ICD-10-CM

## 2024-07-02 DIAGNOSIS — E03.8 OTHER SPECIFIED HYPOTHYROIDISM: ICD-10-CM

## 2024-07-02 PROCEDURE — 3017F COLORECTAL CA SCREEN DOC REV: CPT | Performed by: INTERNAL MEDICINE

## 2024-07-02 PROCEDURE — 3074F SYST BP LT 130 MM HG: CPT | Performed by: INTERNAL MEDICINE

## 2024-07-02 PROCEDURE — G8399 PT W/DXA RESULTS DOCUMENT: HCPCS | Performed by: INTERNAL MEDICINE

## 2024-07-02 PROCEDURE — 3051F HG A1C>EQUAL 7.0%<8.0%: CPT | Performed by: INTERNAL MEDICINE

## 2024-07-02 PROCEDURE — 3078F DIAST BP <80 MM HG: CPT | Performed by: INTERNAL MEDICINE

## 2024-07-02 PROCEDURE — 1036F TOBACCO NON-USER: CPT | Performed by: INTERNAL MEDICINE

## 2024-07-02 PROCEDURE — G8417 CALC BMI ABV UP PARAM F/U: HCPCS | Performed by: INTERNAL MEDICINE

## 2024-07-02 PROCEDURE — 2022F DILAT RTA XM EVC RTNOPTHY: CPT | Performed by: INTERNAL MEDICINE

## 2024-07-02 PROCEDURE — 1090F PRES/ABSN URINE INCON ASSESS: CPT | Performed by: INTERNAL MEDICINE

## 2024-07-02 PROCEDURE — 99214 OFFICE O/P EST MOD 30 MIN: CPT | Performed by: INTERNAL MEDICINE

## 2024-07-02 PROCEDURE — 1123F ACP DISCUSS/DSCN MKR DOCD: CPT | Performed by: INTERNAL MEDICINE

## 2024-07-02 PROCEDURE — G8427 DOCREV CUR MEDS BY ELIG CLIN: HCPCS | Performed by: INTERNAL MEDICINE

## 2024-07-02 RX ORDER — LEVOTHYROXINE SODIUM 0.1 MG/1
TABLET ORAL
Qty: 90 TABLET | Refills: 1 | Status: SHIPPED | OUTPATIENT
Start: 2024-07-02

## 2024-07-02 RX ORDER — BACILLUS COAGULANS/INULIN 1B-250 MG
1 CAPSULE ORAL
COMMUNITY

## 2024-07-02 RX ORDER — DULAGLUTIDE 3 MG/.5ML
3 INJECTION, SOLUTION SUBCUTANEOUS WEEKLY
Qty: 4 ADJUSTABLE DOSE PRE-FILLED PEN SYRINGE | Refills: 5 | Status: SHIPPED | OUTPATIENT
Start: 2024-07-02

## 2024-07-02 RX ORDER — DULAGLUTIDE 1.5 MG/.5ML
INJECTION, SOLUTION SUBCUTANEOUS
Qty: 4 ML | Refills: 2 | Status: CANCELLED | OUTPATIENT
Start: 2024-07-02

## 2024-07-02 RX ORDER — GLUCOSAMINE HCL/CHONDROITIN SU 500-400 MG
CAPSULE ORAL
Qty: 100 STRIP | Refills: 3 | Status: SHIPPED | OUTPATIENT
Start: 2024-07-02

## 2024-07-02 SDOH — ECONOMIC STABILITY: FOOD INSECURITY: WITHIN THE PAST 12 MONTHS, YOU WORRIED THAT YOUR FOOD WOULD RUN OUT BEFORE YOU GOT MONEY TO BUY MORE.: NEVER TRUE

## 2024-07-02 SDOH — ECONOMIC STABILITY: INCOME INSECURITY: HOW HARD IS IT FOR YOU TO PAY FOR THE VERY BASICS LIKE FOOD, HOUSING, MEDICAL CARE, AND HEATING?: NOT HARD AT ALL

## 2024-07-02 SDOH — ECONOMIC STABILITY: FOOD INSECURITY: WITHIN THE PAST 12 MONTHS, THE FOOD YOU BOUGHT JUST DIDN'T LAST AND YOU DIDN'T HAVE MONEY TO GET MORE.: NEVER TRUE

## 2024-07-02 NOTE — PROGRESS NOTES
Food in the Last Year: Never true     Ran Out of Food in the Last Year: Never true   Transportation Needs: Unknown (7/2/2024)    PRAPARE - Transportation     Lack of Transportation (Medical): Not on file     Lack of Transportation (Non-Medical): No   Physical Activity: Not on file   Stress: Not on file   Social Connections: Not on file   Intimate Partner Violence: Not on file   Housing Stability: Unknown (7/2/2024)    Housing Stability Vital Sign     Unable to Pay for Housing in the Last Year: Not on file     Number of Places Lived in the Last Year: Not on file     Unstable Housing in the Last Year: No       Family History   Problem Relation Age of Onset    Arthritis Mother     High Blood Pressure Mother     Heart Disease Father     Stroke Father     Diabetes Sister     Ovarian Cancer Paternal Grandmother         unknown age       Review of Systems     See HPI    /70   Pulse 84   Temp 98.2 °F (36.8 °C)   Resp 18   Wt 90.3 kg (199 lb)   LMP 11/04/2015   SpO2 98%   BMI 33.12 kg/m²     Physical Examination: General appearance - alert, well appearing, and in no distress and overweight  Mental status - alert, oriented to person, place, and time  Neck - supple, no significant adenopathy  Chest - clear to auscultation, no wheezes, rales or rhonchi, symmetric air entry  Heart - normal rate, regular rhythm, normal S1, S2, with SM  Abdomen - soft, nontender, nondistended, no masses or organomegaly  Neurological - alert, oriented, normal speech, no focal findings or movement disorder noted, Babinski sign negative  Musculoskeletal - no joint tenderness, deformity or swelling  Extremities - peripheral pulses normal, no pedal edema, no clubbing or cyanosis, Carina's sign negative bilaterally  Skin - normal coloration and turgor, no rashes, no suspicious skin lesions noted     I have reviewed recent diagnostic testing including labs           Orders Placed This Encounter   Procedures    TSH with Reflex     Standing

## 2024-07-03 DIAGNOSIS — E11.9 TYPE 2 DIABETES MELLITUS WITHOUT COMPLICATION, UNSPECIFIED WHETHER LONG TERM INSULIN USE (HCC): Primary | ICD-10-CM

## 2024-07-03 RX ORDER — DULAGLUTIDE 1.5 MG/.5ML
1.5 INJECTION, SOLUTION SUBCUTANEOUS WEEKLY
Qty: 2 ML | Refills: 3 | Status: SHIPPED | OUTPATIENT
Start: 2024-07-03

## 2024-07-08 ENCOUNTER — OFFICE VISIT (OUTPATIENT)
Dept: INTERNAL MEDICINE CLINIC | Age: 64
End: 2024-07-08
Payer: MEDICARE

## 2024-07-08 VITALS — WEIGHT: 200.4 LBS | BODY MASS INDEX: 33.35 KG/M2

## 2024-07-08 DIAGNOSIS — E11.9 TYPE 2 DIABETES MELLITUS WITHOUT COMPLICATION, UNSPECIFIED WHETHER LONG TERM INSULIN USE (HCC): Primary | ICD-10-CM

## 2024-07-08 PROCEDURE — 97803 MED NUTRITION INDIV SUBSEQ: CPT | Performed by: DIETITIAN, REGISTERED

## 2024-07-08 NOTE — PROGRESS NOTES
[facility]  [facility]  [address] [place] [address]  [phone] (phone)  [phone] (fax)    Patient Name: Vicki Solano. Date of Birth: [id number]. MRN: [id number]      Assessment: Patient is a 64 y.o. female seen for Initial MNT visit for Diabetes.     -Nutritionally relevant labs:   Lab Results   Component Value Date/Time    LABA1C 7.5 (H) 06/26/2024 09:56 AM    LABA1C 7.0 (H) 02/22/2024 01:34 PM    LABA1C 6.8 (H) 10/19/2023 12:48 PM    LABA1C 6.2 (H) 05/18/2023 01:51 PM    LABA1C 5.8 01/30/2020 04:02 AM    LABA1C 6.5 (H) 01/22/2014 10:23 AM    GLUCOSE 158 (H) 06/26/2024 09:56 AM    GLUCOSE 160 (H) 01/09/2024 09:18 AM    CHOL 129 01/09/2024 09:18 AM    CHOL 111 01/30/2020 04:02 AM    HDL 41 01/09/2024 09:18 AM    TRIG 273 (H) 01/09/2024 09:18 AM     -Blood sugar trends: once every other day. FBS mainly. Per pt 140-177 mg/dL are her fasting.     DB meds:  Truliciy - 1.5 mg weekly  Metformin 1000 mg - am & 500 mg - pm    -Food recall:   Breakfast: pro shakes: protein powder and collegan powder, frozen fruit, greesn water/oatmeal (sugar and chocolate chips)/fiber one cereal w/prunes.   Lunch: pot pie/ burritos/salads/1/2 lucinda and pb, cheese and crackers.   Dinner: lean cuisine/pasta/rice and sausage/pork chops, veggies, corn bread .   Snacks: rice cakes w/ pb or cream cheese/deonte chips/    -Main Beverages: did not review    -  Current Outpatient Medications on File Prior to Visit   Medication Sig Dispense Refill    dulaglutide (TRULICITY) 1.5 MG/0.5ML SC injection Inject 0.5 mLs into the skin once a week 2 mL 3    Bacillus Coagulans-Inulin (PROBIOTIC) 1-250 BILLION-MG CAPS Take 1 capsule by mouth      metFORMIN (GLUCOPHAGE) 500 MG tablet Take two tablets in AM and one tab evening 270 tablet 3    blood glucose monitor strips Test blood sugar 1 time a day 100 strip 3    Dulaglutide (TRULICITY) 3 MG/0.5ML SOPN Inject 3 mg into the skin once a week

## 2024-07-08 NOTE — PATIENT INSTRUCTIONS
Blood Sugar Goals    Fasting < 130  2 hr after meal < 160-180mg   Before meal (no food for about 4 hrs) < 130    1-2 Rice cakes are ok snack     [email or web address]

## 2024-07-12 NOTE — TELEPHONE ENCOUNTER
Received refill request for Fluticasone. Medication was last ordered by Maria Luisa Newsome. Medication was last ordered on 7/24/23 with 2 refills.   Patient was last seen in the office 9/18/23. Patient has a scheduled follow up 9/23/24.   Medication needs to be sent to [facility].

## 2024-07-15 RX ORDER — FLUTICASONE PROPIONATE 50 MCG
2 SPRAY, SUSPENSION (ML) NASAL DAILY
Qty: 48 G | Refills: 2 | Status: SHIPPED | OUTPATIENT
Start: 2024-07-15

## 2024-09-09 ENCOUNTER — HOSPITAL ENCOUNTER (OUTPATIENT)
Dept: WOMENS IMAGING | Age: 64
Discharge: HOME | End: 2024-09-09
Payer: MEDICARE

## 2024-09-09 VITALS — HEIGHT: 65 IN | WEIGHT: 200 LBS | BODY MASS INDEX: 33.32 KG/M2

## 2024-09-09 DIAGNOSIS — Z12.31 VISIT FOR SCREENING MAMMOGRAM: ICD-10-CM

## 2024-09-09 PROCEDURE — 77063 BREAST TOMOSYNTHESIS BI: CPT

## 2024-09-16 ENCOUNTER — OFFICE VISIT (OUTPATIENT)
Dept: INTERNAL MEDICINE CLINIC | Age: 64
End: 2024-09-16
Payer: MEDICARE

## 2024-09-16 VITALS — BODY MASS INDEX: 34.41 KG/M2 | WEIGHT: 206.8 LBS

## 2024-09-16 DIAGNOSIS — E66.9 OBESITY (BMI 30-39.9): ICD-10-CM

## 2024-09-16 DIAGNOSIS — I10 PRIMARY HYPERTENSION: ICD-10-CM

## 2024-09-16 DIAGNOSIS — E11.9 TYPE 2 DIABETES MELLITUS WITHOUT COMPLICATION, UNSPECIFIED WHETHER LONG TERM INSULIN USE (HCC): Primary | ICD-10-CM

## 2024-09-16 PROCEDURE — 97803 MED NUTRITION INDIV SUBSEQ: CPT | Performed by: DIETITIAN, REGISTERED

## 2024-09-17 ENCOUNTER — HOSPITAL ENCOUNTER (EMERGENCY)
Age: 64
Discharge: HOME | End: 2024-09-17
Payer: MEDICARE

## 2024-09-17 ENCOUNTER — OFFICE VISIT (OUTPATIENT)
Dept: PULMONOLOGY | Age: 64
End: 2024-09-17
Payer: MEDICARE

## 2024-09-17 VITALS
SYSTOLIC BLOOD PRESSURE: 128 MMHG | WEIGHT: 204.8 LBS | OXYGEN SATURATION: 97 % | DIASTOLIC BLOOD PRESSURE: 60 MMHG | HEART RATE: 78 BPM | TEMPERATURE: 97.5 F | BODY MASS INDEX: 34.12 KG/M2 | HEIGHT: 65 IN

## 2024-09-17 VITALS
OXYGEN SATURATION: 97 % | DIASTOLIC BLOOD PRESSURE: 80 MMHG | TEMPERATURE: 98.4 F | RESPIRATION RATE: 16 BRPM | SYSTOLIC BLOOD PRESSURE: 132 MMHG | HEART RATE: 76 BPM

## 2024-09-17 DIAGNOSIS — G47.33 OBSTRUCTIVE SLEEP APNEA ON CPAP: Primary | ICD-10-CM

## 2024-09-17 DIAGNOSIS — E66.9 OBESITY (BMI 30-39.9): ICD-10-CM

## 2024-09-17 DIAGNOSIS — J01.90 ACUTE RHINOSINUSITIS: Primary | ICD-10-CM

## 2024-09-17 PROCEDURE — 1090F PRES/ABSN URINE INCON ASSESS: CPT | Performed by: NURSE PRACTITIONER

## 2024-09-17 PROCEDURE — G8399 PT W/DXA RESULTS DOCUMENT: HCPCS | Performed by: NURSE PRACTITIONER

## 2024-09-17 PROCEDURE — G8427 DOCREV CUR MEDS BY ELIG CLIN: HCPCS | Performed by: NURSE PRACTITIONER

## 2024-09-17 PROCEDURE — 1036F TOBACCO NON-USER: CPT | Performed by: NURSE PRACTITIONER

## 2024-09-17 PROCEDURE — 99213 OFFICE O/P EST LOW 20 MIN: CPT

## 2024-09-17 PROCEDURE — 99213 OFFICE O/P EST LOW 20 MIN: CPT | Performed by: NURSE PRACTITIONER

## 2024-09-17 PROCEDURE — 3017F COLORECTAL CA SCREEN DOC REV: CPT | Performed by: NURSE PRACTITIONER

## 2024-09-17 PROCEDURE — 99213 OFFICE O/P EST LOW 20 MIN: CPT | Performed by: EMERGENCY MEDICINE

## 2024-09-17 PROCEDURE — G8417 CALC BMI ABV UP PARAM F/U: HCPCS | Performed by: NURSE PRACTITIONER

## 2024-09-17 PROCEDURE — 1123F ACP DISCUSS/DSCN MKR DOCD: CPT | Performed by: NURSE PRACTITIONER

## 2024-09-17 PROCEDURE — 3078F DIAST BP <80 MM HG: CPT | Performed by: NURSE PRACTITIONER

## 2024-09-17 PROCEDURE — 3074F SYST BP LT 130 MM HG: CPT | Performed by: NURSE PRACTITIONER

## 2024-09-17 ASSESSMENT — ENCOUNTER SYMPTOMS
WHEEZING: 0
SINUS PRESSURE: 1
EYES NEGATIVE: 1
WHEEZING: 0
COUGH: 0
RESPIRATORY NEGATIVE: 1
BACK PAIN: 1
SHORTNESS OF BREATH: 0
SORE THROAT: 0
COUGH: 0
SINUS PAIN: 1
ALLERGIC/IMMUNOLOGIC NEGATIVE: 1
GASTROINTESTINAL NEGATIVE: 1

## 2024-10-12 DIAGNOSIS — E11.9 TYPE 2 DIABETES MELLITUS WITHOUT COMPLICATION, WITHOUT LONG-TERM CURRENT USE OF INSULIN (HCC): ICD-10-CM

## 2024-10-14 RX ORDER — BLOOD SUGAR DIAGNOSTIC
STRIP MISCELLANEOUS
Qty: 100 EACH | Refills: 0 | OUTPATIENT
Start: 2024-10-14

## 2024-10-21 DIAGNOSIS — E11.9 TYPE 2 DIABETES MELLITUS WITHOUT COMPLICATION, WITHOUT LONG-TERM CURRENT USE OF INSULIN (HCC): Primary | ICD-10-CM

## 2024-10-22 RX ORDER — GLUCOSAMINE HCL/CHONDROITIN SU 500-400 MG
CAPSULE ORAL
Qty: 100 STRIP | Refills: 3 | Status: SHIPPED | OUTPATIENT
Start: 2024-10-22

## 2024-10-29 LAB
T4 FREE: 0.83 NG/DL (ref 0.61–1.12)
TSH REFLEX: 1.43 MCIU/ML (ref 0.49–4.67)

## 2024-11-11 ENCOUNTER — OFFICE VISIT (OUTPATIENT)
Dept: INTERNAL MEDICINE CLINIC | Age: 64
End: 2024-11-11

## 2024-11-11 VITALS
OXYGEN SATURATION: 97 % | SYSTOLIC BLOOD PRESSURE: 120 MMHG | WEIGHT: 203.8 LBS | HEART RATE: 88 BPM | RESPIRATION RATE: 18 BRPM | TEMPERATURE: 97 F | DIASTOLIC BLOOD PRESSURE: 72 MMHG | BODY MASS INDEX: 33.95 KG/M2 | HEIGHT: 65 IN

## 2024-11-11 DIAGNOSIS — E11.9 TYPE 2 DIABETES MELLITUS WITHOUT COMPLICATION, WITHOUT LONG-TERM CURRENT USE OF INSULIN (HCC): Primary | ICD-10-CM

## 2024-11-11 DIAGNOSIS — E78.5 DYSLIPIDEMIA: ICD-10-CM

## 2024-11-11 DIAGNOSIS — E03.8 OTHER SPECIFIED HYPOTHYROIDISM: ICD-10-CM

## 2024-11-11 LAB
CHP ED QC CHECK: NORMAL
GLUCOSE BLD-MCNC: 220 MG/DL

## 2024-11-11 RX ORDER — LEVOTHYROXINE SODIUM 100 UG/1
TABLET ORAL
Qty: 90 TABLET | Refills: 1 | Status: SHIPPED | OUTPATIENT
Start: 2024-11-11

## 2024-11-11 RX ORDER — FENOFIBRATE 145 MG/1
145 TABLET, COATED ORAL DAILY
Qty: 90 TABLET | Refills: 1 | Status: SHIPPED | OUTPATIENT
Start: 2024-11-11

## 2024-11-11 RX ORDER — DULAGLUTIDE 3 MG/.5ML
3 INJECTION, SOLUTION SUBCUTANEOUS WEEKLY
Qty: 2 ML | Refills: 3 | Status: CANCELLED | OUTPATIENT
Start: 2024-11-11

## 2024-11-11 NOTE — PROGRESS NOTES
[facility]   [address]  [place] [id number]  Dept: [phone]  Dept Fax: [phone]        YOB: 1960    Chief Complaint   Patient presents with    Diabetes     Spot glucose 220  10/29 A1c 7.8% (6/26 - 7.5%)    Hypothyroidism       63 yo white female   presents for evaluation of DM-2   I have seen the patient previously, since then seen our diabetic clinic team.    no cp or SOB, no low sugars and no LOC. No recent hospitalizations , had CAD with stents followed by dr. Roper at   [facility] cardiology.  Also had nuclear stress recently which was negative at [facility]. We are following with her DM and HTN..  she is having low sugars, in the 70's, she is a pleasant WF not in distress. Recent eye exam on 2/23 , about 3 months ago. Hx of Sjogrens and Ankylosing spondylitis, recently diagnosed by Rheumatologist at [facility]. Recent sugars numbers are acceptable, she does not qualify for [employer], so she checks them about once a day.        She  did bring in her blood sugar log running anywhere from 1 30-1 80, and she does have a One Touch ultra -2  Patient admits  to being compliant with her medications. Admits to being laxed with her diet   Intake and does not check sugars regularly.    She also some weight with a new diet, about 10lbs  Less. Currently taking glucophage and Truicity 1.5 mg shots weekly, admits to taking the latter . She takes Reptha every other week due to high lipids.   Could not tolerate statins.     Sugars could not be downloaded today she forgot her meter, recent A1c from 10/20 I was 7.8 which is 7.5 in the past, despite glucose of 220 today.    She is seeing a rheumatologist at [place], diagnosed with Ankylosing spondylitis, and Sjogrens  but now as the doc is gone, she will follow up with local rheumatologist . Was on prednisone for flare up for about a week, but no recent falls or LOC and no significant CP,, had a  nuclear stress at [facility] ,  done by

## 2024-12-11 LAB
ANION GAP SERPL CALCULATED.3IONS-SCNC: 5 MMOL/L (ref 4–12)
BUN BLDV-MCNC: 18 MG/DL (ref 7–20)
CALCIUM SERPL-MCNC: 10 MG/DL (ref 8.8–10.5)
CHLORIDE BLD-SCNC: 104 MEQ/L (ref 101–111)
CO2: 29 MEQ/L (ref 21–32)
CREAT SERPL-MCNC: 0.89 MG/DL (ref 0.6–1.3)
CREATININE CLEARANCE: >60
GLUCOSE: 145 MG/DL (ref 70–110)
POTASSIUM SERPL-SCNC: 4.3 MEQ/L (ref 3.6–5)
SODIUM BLD-SCNC: 138 MEQ/L (ref 135–145)

## 2024-12-17 ENCOUNTER — TELEPHONE (OUTPATIENT)
Dept: PULMONOLOGY | Age: 64
End: 2024-12-17

## 2024-12-17 NOTE — TELEPHONE ENCOUNTER
12-17-24 LVM let pt know we need to r/s her appt due to provider no longer practicing here at [facility],

## 2025-01-13 ENCOUNTER — OFFICE VISIT (OUTPATIENT)
Dept: INTERNAL MEDICINE CLINIC | Age: 65
End: 2025-01-13
Payer: MEDICARE

## 2025-01-13 VITALS — WEIGHT: 190 LBS | BODY MASS INDEX: 31.62 KG/M2

## 2025-01-13 DIAGNOSIS — E11.9 TYPE 2 DIABETES MELLITUS WITHOUT COMPLICATION, WITHOUT LONG-TERM CURRENT USE OF INSULIN (HCC): Primary | ICD-10-CM

## 2025-01-13 PROCEDURE — 97803 MED NUTRITION INDIV SUBSEQ: CPT | Performed by: DIETITIAN, REGISTERED

## 2025-01-13 PROCEDURE — NBSRV NON-BILLABLE SERVICE: Performed by: DIETITIAN, REGISTERED

## 2025-01-13 NOTE — PROGRESS NOTES
[facility]  [facility]  [address] [place] [address]  [phone] (phone)  [phone] (fax)    Patient Name: Vicki Solano. Date of Birth: [id number]. MRN: [id number]      Assessment: Patient is a 64 y.o. female seen for follow-up MNT visit for Diabetes.     -Nutritionally relevant labs:   Lab Results   Component Value Date/Time    LABA1C 7.8 (H) 10/29/2024 11:08 AM    LABA1C 7.5 (H) 06/26/2024 09:56 AM    LABA1C 7.0 (H) 02/22/2024 01:34 PM    LABA1C 6.8 (H) 10/19/2023 12:48 PM    LABA1C 6.2 (H) 05/18/2023 01:51 PM    LABA1C 5.8 01/30/2020 04:02 AM    GLUCOSE 145 (H) 12/11/2024 08:35 AM    GLUCOSE 160 (H) 10/29/2024 11:08 AM    CHOL 138 10/29/2024 11:08 AM    HDL 42 10/29/2024 11:08 AM    TRIG 343 (H) 10/29/2024 11:08 AM          Latest Ref Rng & Units 12/11/2024     8:35 AM 10/29/2024    11:08 AM 6/26/2024     9:56 AM 1/9/2024     9:18 AM 7/17/2023     1:42 PM   Labs Renal   BUN 7 - 20 mg/dL 18  9  11  12  12    Cr 0.60 - 1.30 mg/dL 0.89  0.59  0.66  0.66  0.62    K 3.6 - 5.0 mEq/L 4.3  4.2  4.3  4.1     Na 135 - 145 mEq/L 138  140  138  140        -Blood sugar trends: per AGP Report bld sugars in range    - Recent colonoscopy and was recommended to increase fiber intake.     -Food recall:   Breakfast: 10 am brand cereal, almond milk, prunes.   Lunch: snack- yogurt or cheese sticks w/ ritz crackers.   Dinner: meat and starch and mostly   Snacks: fig bars \"[employer]\" 38 gm carb, 19 gm sugar    -Main Beverages: did not review at this visit  -  Current Outpatient Medications on File Prior to Visit   Medication Sig Dispense Refill    levothyroxine (SYNTHROID) 100 MCG tablet New lower dose one daily am on empty stomach 90 tablet 1    Dulaglutide 4.5 MG/0.5ML SOAJ Inject 4.5 mg into the skin Once a week at 5 PM 2 mL 5    metFORMIN (GLUCOPHAGE) 1000 MG tablet Change to one tab  tablet 5    empagliflozin (JARDIANCE) 10 MG tablet Take 1

## 2025-01-13 NOTE — PATIENT INSTRUCTIONS
Which foods are the best sources of soluble fiber?   Fruits: Apples, bananas, berries, dried figs, oranges, pears   Legumes: Beans, lentils, peas   Nuts and Seeds: Almonds, shruthi seeds, flaxseeds, peanuts, pumpkin seeds   Vegetables: Avocado, broccoli, Clarence sprouts, carrots, green beans, kale, sweet potato   Whole Grains: Barley, brown rice, oats, popcorn, quinoa, rye, teff     Tofu and Tempeh and be fun to try!     Barley and Steel Cut     [email or web address]    Diabetes.org recipes    Check blood sugar 2 hr after largest meal once a week. Goal is less than 160-180 mg/dL.

## 2025-01-14 NOTE — PROGRESS NOTES
by mouth 4 times daily. 2 tablet in the morning, 2 at lunch, 3 at dinner      atenolol (TENORMIN) 50 MG tablet Take 1 tablet by mouth daily      Multiple Vitamin (MULTI-VITAMIN PO) Take  by mouth daily.        B Complex Vitamins (VITAMIN B COMPLEX PO) Take  by mouth daily.        calcium carbonate-vitamin D (CALTRATE) 600-400 MG-UNIT TABS per tab Take 1 tablet by mouth daily      Flaxseed, Linseed, (FLAXSEED OIL) 1000 MG CAPS Take  by mouth daily.        buPROPion (WELLBUTRIN XL) 300 MG extended release tablet Take 1 tablet by mouth daily       No current facility-administered medications for this visit.       Review of systems     Review of Systems   Constitutional:  Negative for appetite change and fever.   HENT:  Negative for congestion, postnasal drip, rhinorrhea, sinus pressure, sinus pain, sneezing and sore throat.    Respiratory:  Negative for cough, shortness of breath and wheezing.    Cardiovascular:  Negative for chest pain, palpitations and leg swelling.   Musculoskeletal:  Positive for arthralgias and back pain.   Allergic/Immunologic: Negative for environmental allergies.   Psychiatric/Behavioral:          Depression          Physical exam     BMI:  Body mass index is 31.72 kg/m².    Wt Readings from Last 3 Encounters:   01/15/25 86.5 kg (190 lb 9.6 oz)   01/13/25 86.2 kg (190 lb)   11/11/24 92.4 kg (203 lb 12.8 oz)     Vitals: /72 (Site: Right Upper Arm, Position: Sitting, Cuff Size: Medium Adult)   Pulse 82   Temp 97.2 °F (36.2 °C) (Infrared)   Ht 1.651 m (5' 5\")   Wt 86.5 kg (190 lb 9.6 oz)   [facility] 11/04/2015   SpO2 100% Comment: ra  BMI 31.72 kg/m²       Physical Exam  Vitals and nursing note reviewed.   Constitutional:       General: She is not in acute distress.     Appearance: She is well-developed. She is obese.   HENT:      Head: Normocephalic and atraumatic.      Right Ear: External ear normal.      Left Ear: External ear normal.      Mouth/Throat:      Mouth: Mucous membranes are

## 2025-01-15 ENCOUNTER — OFFICE VISIT (OUTPATIENT)
Dept: PULMONOLOGY | Age: 65
End: 2025-01-15
Payer: MEDICARE

## 2025-01-15 VITALS
BODY MASS INDEX: 31.75 KG/M2 | OXYGEN SATURATION: 100 % | HEART RATE: 82 BPM | SYSTOLIC BLOOD PRESSURE: 134 MMHG | HEIGHT: 65 IN | DIASTOLIC BLOOD PRESSURE: 72 MMHG | TEMPERATURE: 97.2 F | WEIGHT: 190.6 LBS

## 2025-01-15 DIAGNOSIS — G47.61 PLMD (PERIODIC LIMB MOVEMENT DISORDER): ICD-10-CM

## 2025-01-15 DIAGNOSIS — E66.9 OBESITY (BMI 30-39.9): ICD-10-CM

## 2025-01-15 DIAGNOSIS — G47.33 OBSTRUCTIVE SLEEP APNEA ON CPAP: Primary | ICD-10-CM

## 2025-01-15 PROCEDURE — 1036F TOBACCO NON-USER: CPT

## 2025-01-15 PROCEDURE — 1123F ACP DISCUSS/DSCN MKR DOCD: CPT

## 2025-01-15 PROCEDURE — G8399 PT W/DXA RESULTS DOCUMENT: HCPCS

## 2025-01-15 PROCEDURE — 1090F PRES/ABSN URINE INCON ASSESS: CPT

## 2025-01-15 PROCEDURE — 99214 OFFICE O/P EST MOD 30 MIN: CPT

## 2025-01-15 PROCEDURE — G8417 CALC BMI ABV UP PARAM F/U: HCPCS

## 2025-01-15 PROCEDURE — G8427 DOCREV CUR MEDS BY ELIG CLIN: HCPCS

## 2025-01-15 PROCEDURE — 1159F MED LIST DOCD IN RCRD: CPT

## 2025-01-15 PROCEDURE — 3075F SYST BP GE 130 - 139MM HG: CPT

## 2025-01-15 PROCEDURE — 3078F DIAST BP <80 MM HG: CPT

## 2025-01-15 PROCEDURE — 3017F COLORECTAL CA SCREEN DOC REV: CPT

## 2025-01-15 RX ORDER — VENLAFAXINE 75 MG/1
75 TABLET ORAL DAILY
COMMUNITY
Start: 2024-11-19

## 2025-01-15 RX ORDER — HYDROCORTISONE ACETATE 25 MG
SUPPOSITORY, RECTAL RECTAL
COMMUNITY
Start: 2024-11-19

## 2025-01-15 RX ORDER — VENLAFAXINE HYDROCHLORIDE 150 MG/1
CAPSULE, EXTENDED RELEASE ORAL
COMMUNITY
Start: 2025-01-07

## 2025-01-15 ASSESSMENT — ENCOUNTER SYMPTOMS
BACK PAIN: 1
COUGH: 0
RHINORRHEA: 0
WHEEZING: 0
SHORTNESS OF BREATH: 0
SORE THROAT: 0
SINUS PRESSURE: 0
SINUS PAIN: 0

## 2025-02-03 ENCOUNTER — OFFICE VISIT (OUTPATIENT)
Age: 65
End: 2025-02-03
Payer: MEDICARE

## 2025-02-03 VITALS
DIASTOLIC BLOOD PRESSURE: 86 MMHG | SYSTOLIC BLOOD PRESSURE: 124 MMHG | OXYGEN SATURATION: 99 % | BODY MASS INDEX: 31.77 KG/M2 | HEIGHT: 65 IN | HEART RATE: 80 BPM | WEIGHT: 190.7 LBS

## 2025-02-03 DIAGNOSIS — R68.2 DRY MOUTH: ICD-10-CM

## 2025-02-03 DIAGNOSIS — M45.0 ANKYLOSING SPONDYLITIS OF MULTIPLE SITES IN SPINE (HCC): Primary | ICD-10-CM

## 2025-02-03 DIAGNOSIS — Z79.899 ENCOUNTER FOR LONG-TERM (CURRENT) USE OF HIGH-RISK MEDICATION: ICD-10-CM

## 2025-02-03 PROCEDURE — 1159F MED LIST DOCD IN RCRD: CPT | Performed by: INTERNAL MEDICINE

## 2025-02-03 PROCEDURE — 3079F DIAST BP 80-89 MM HG: CPT | Performed by: INTERNAL MEDICINE

## 2025-02-03 PROCEDURE — 1125F AMNT PAIN NOTED PAIN PRSNT: CPT | Performed by: INTERNAL MEDICINE

## 2025-02-03 PROCEDURE — 99204 OFFICE O/P NEW MOD 45 MIN: CPT | Performed by: INTERNAL MEDICINE

## 2025-02-03 PROCEDURE — G8399 PT W/DXA RESULTS DOCUMENT: HCPCS | Performed by: INTERNAL MEDICINE

## 2025-02-03 PROCEDURE — 1036F TOBACCO NON-USER: CPT | Performed by: INTERNAL MEDICINE

## 2025-02-03 PROCEDURE — G2211 COMPLEX E/M VISIT ADD ON: HCPCS | Performed by: INTERNAL MEDICINE

## 2025-02-03 PROCEDURE — 99203 OFFICE O/P NEW LOW 30 MIN: CPT | Performed by: INTERNAL MEDICINE

## 2025-02-03 PROCEDURE — 1123F ACP DISCUSS/DSCN MKR DOCD: CPT | Performed by: INTERNAL MEDICINE

## 2025-02-03 PROCEDURE — 3074F SYST BP LT 130 MM HG: CPT | Performed by: INTERNAL MEDICINE

## 2025-02-03 PROCEDURE — 1090F PRES/ABSN URINE INCON ASSESS: CPT | Performed by: INTERNAL MEDICINE

## 2025-02-03 PROCEDURE — 3017F COLORECTAL CA SCREEN DOC REV: CPT | Performed by: INTERNAL MEDICINE

## 2025-02-03 PROCEDURE — G8427 DOCREV CUR MEDS BY ELIG CLIN: HCPCS | Performed by: INTERNAL MEDICINE

## 2025-02-03 PROCEDURE — G8417 CALC BMI ABV UP PARAM F/U: HCPCS | Performed by: INTERNAL MEDICINE

## 2025-02-03 RX ORDER — CELECOXIB 200 MG/1
200 CAPSULE ORAL DAILY
Qty: 30 CAPSULE | Refills: 1 | Status: SHIPPED | OUTPATIENT
Start: 2025-02-03

## 2025-02-03 RX ORDER — SULFASALAZINE 500 MG/1
500 TABLET ORAL 2 TIMES DAILY
Qty: 60 TABLET | Refills: 1 | Status: SHIPPED | OUTPATIENT
Start: 2025-02-03

## 2025-02-03 ASSESSMENT — ENCOUNTER SYMPTOMS
ABDOMINAL PAIN: 0
COUGH: 0
VOMITING: 0
NAUSEA: 0
SHORTNESS OF BREATH: 0
BLOOD IN STOOL: 0

## 2025-02-03 ASSESSMENT — RHEUMATOLOGY NEW PATIENT QUESTIONNAIRE
EYE PAIN: N
SEIZURES: N
DIFFICULTY STAYING ASLEEP: N
ANXIETY: Y
EYE DRYNESS: Y
JOINT SWELLING: Y
SORES IN MOUTH OR NOSE: N
DIFFICULTY SWALLOWING: N
FEVER: N
DIFFICULTY FALLING ASLEEP: N
BEHAVIORAL CHANGES: N
RASH: N
VAGINAL DRYNESS: N
HEARTBURN OR REFLUX: N
COLOR CHANGES OF HANDS OR FEET IN THE COLD: N
SKIN TIGHTNESS: N
LOSS OF CONSCIOUSNESS: N
LOSS OF VISION: Y
EASILY LOSING TEMPER: N
JOINT PAIN: Y
MEMORY LOSS: Y
CHEST PAIN: N
MUSCLE WEAKNESS: Y
EXCESSIVE HAIR LOSS (MORE THAN YOUR NORM): N
BLACK STOOLS: N
RASH OR ULCERS: N
HEADACHES: Y
SUN SENSITIVE (SUN ALLERGY): N
INCREASED SUSCEPTIBILITY TO INFECTION: Y
HOARSE VOICE: N
HOW WOULD YOU DESCRIBE YOUR STIFFNESS ON AVERAGE: MODERATE
DEPRESSION: Y
DIFFICULTY BREATHING LYING DOWN: N
UNEXPLAINED WEIGHT CHANGE: N
VOMITING OF BLOOD OR COFFEE GROUND CONSISTENCY MATERIAL: N
DRYNESS OF MOUTH: Y
JAUNDICE: N
NIGHT SWEATS: N
UNUSUALLY RAPID OR SLOWED HEART RATE: N
NODULES/BUMPS: N
MORNING STIFFNESS IN LOWER BACK: Y
UNUSUAL FATIGUE: Y
FAINTING: N
AGITATION: N
STOMACH PAIN: Y
MORNING STIFFNESS: Y
SWOLLEN LEGS OR FEET: N
PERSISTENT DIARRHEA: N
PAIN OR BURNING ON URINATION: N
COUGH: N
NAUSEA: N
UNUSUAL BLEEDING: N
SKIN REDNESS: Y
SHORTNESS OF BREATH: Y
SWOLLEN OR TENDER GLANDS: N
BLOOD IN STOOLS: Y
EYE REDNESS: N
UNEXPLAINED HEARING LOSS: Y
NUMBNESS OR TINGLING IN HANDS OR FEET: Y
DOUBLE OR BLURRED VISION: Y
EASY BRUISING: Y
ABNORMAL URINE: N
ANEMIA: N

## 2025-02-03 ASSESSMENT — JOINT PAIN
TOTAL NUMBER OF SWOLLEN JOINTS: 0
TOTAL NUMBER OF TENDER JOINTS: 4

## 2025-02-03 NOTE — PROGRESS NOTES
[facility]   Rheumatology Clinic Note      2/3/2025       Reason for Consult:  ankylosing spondylitis  Requesting Physician:  Mitchel Peralta III*     CHIEF COMPLAINT:    Chief Complaint   Patient presents with    New Patient     Sjogren syndrome    Pain     She reports a headache that has been going on for several years.     OTHER     She was diagnosed with Ankylosing Spondylitis by another rheumatologist (Dr. Behrendsen) 1-2 years ago.       Joint Pain     She reports neck and shoulder pain, knees, feet, hands and elbows.   Pain level 4           HISTORY OF PRESENT ILLNESS:    64 y.o. female presents today to [facility] care. Former pt of Dr. Behrendson. Was diagnosed with akylosing spondylitis 2-3 years ago. Has positive HLA-B27 and XR findings consistent with sacroiliitis.   Is on Celebrex 200 mg daily.    Reports that initial plan was to start biologics, but was on hold due to her having low immune system and was just started on IVIg treatment. Reports that her immunoglobulin levels have improved. Last IVIg treatment was in December 2024.     Pain is mostly in low back, upper back, neck, fingers, elbows, left knee and feet. Morning stiffness lasts for 45 minutes. Celebrex helps significantly. Rates her pain at 4/10 in pain scale. Rare episodes of swelling in her left knee. Reports having meniscal problems in the knee.     No skin rash. No psoriasis.  Has chronic diarrhea. Had recent colonoscopy that was negative.      Past Medical History:     has a past medical history of Anemia, Backache, Depression, Diabetes mellitus (HCC), Eczema, Fibromyalgia, Fracture, Hyperlipidemia, Hypertension, Hypothyroidism, Metabolic syndrome, Migraine, Sinusitis, and Sleep apnea.    Past Surgical History:     has a past surgical history that includes Tonsillectomy (1962); Cholecystectomy (2003); turbinate resection (1991); other surgical history (2000); bone graft (2007); Dilation and curettage of uterus; Carpal

## 2025-02-04 ENCOUNTER — HOSPITAL ENCOUNTER (OUTPATIENT)
Dept: GENERAL RADIOLOGY | Age: 65
Discharge: HOME | End: 2025-02-04

## 2025-02-04 DIAGNOSIS — Z00.6 EXAMINATION FOR NORMAL COMPARISON FOR CLINICAL RESEARCH: ICD-10-CM

## 2025-02-05 DIAGNOSIS — M45.0 ANKYLOSING SPONDYLITIS OF MULTIPLE SITES IN SPINE (HCC): ICD-10-CM

## 2025-02-18 ENCOUNTER — HOSPITAL ENCOUNTER (EMERGENCY)
Age: 65
Discharge: HOME | End: 2025-02-18
Payer: MEDICARE

## 2025-02-18 VITALS
SYSTOLIC BLOOD PRESSURE: 130 MMHG | OXYGEN SATURATION: 100 % | HEART RATE: 78 BPM | RESPIRATION RATE: 20 BRPM | TEMPERATURE: 97.2 F | DIASTOLIC BLOOD PRESSURE: 78 MMHG

## 2025-02-18 DIAGNOSIS — J01.00 ACUTE NON-RECURRENT MAXILLARY SINUSITIS: ICD-10-CM

## 2025-02-18 DIAGNOSIS — H66.001 NON-RECURRENT ACUTE SUPPURATIVE OTITIS MEDIA OF RIGHT EAR WITHOUT SPONTANEOUS RUPTURE OF TYMPANIC MEMBRANE: Primary | ICD-10-CM

## 2025-02-18 PROCEDURE — 99213 OFFICE O/P EST LOW 20 MIN: CPT

## 2025-02-18 PROCEDURE — 99213 OFFICE O/P EST LOW 20 MIN: CPT | Performed by: NURSE PRACTITIONER

## 2025-02-18 ASSESSMENT — ENCOUNTER SYMPTOMS
SINUS PRESSURE: 1
RHINORRHEA: 1
COUGH: 0
SORE THROAT: 0
NAUSEA: 0
DIARRHEA: 0
SHORTNESS OF BREATH: 0
VOMITING: 0
CHEST TIGHTNESS: 0

## 2025-02-18 ASSESSMENT — PAIN SCALES - GENERAL: PAINLEVEL_OUTOF10: 6

## 2025-02-18 ASSESSMENT — PAIN DESCRIPTION - LOCATION: LOCATION: EAR

## 2025-02-18 NOTE — ED PROVIDER NOTES
[facility]  Urgent Care Encounter       CHIEF COMPLAINT       Chief Complaint   Patient presents with    Ear Pain     Right        Nurses Notes reviewed and I agree except as noted in the HPI.  HISTORY OF PRESENT ILLNESS   Vicki Solano is a 64 y.o. female who presents to [facility] with right ear pain and right-sided maxillary sinus pressure.  States the ear pain started approximately 2 weeks ago.  It has progressively worsened.  Now she is experiencing the sinus pressure of the right side for the past 2 days.  Denies any fevers.  States she does have constant rhinorrhea due to allergies.    The history is provided by the patient. No  was used.       REVIEW OF SYSTEMS     Review of Systems   Constitutional:  Negative for activity change, appetite change, chills, fatigue and fever.   HENT:  Positive for ear pain, rhinorrhea and sinus pressure. Negative for ear discharge and sore throat.    Respiratory:  Negative for cough, chest tightness and shortness of breath.    Cardiovascular:  Negative for chest pain.   Gastrointestinal:  Negative for diarrhea, nausea and vomiting.   Genitourinary:  Negative for dysuria.   Skin:  Negative for rash.   Allergic/Immunologic: Negative for environmental allergies and food allergies.   Neurological:  Negative for dizziness and headaches.       PAST MEDICAL HISTORY         Diagnosis Date    Anemia     Backache     Depression     Diabetes mellitus (HCC)     Eczema     Fibromyalgia     Fracture     Hyperlipidemia     Hypertension     Hypothyroidism     Metabolic syndrome     Migraine     Sinusitis     Sleep apnea        SURGICALHISTORY     Patient  has a past surgical history that includes Tonsillectomy (1962); Cholecystectomy (2003); turbinate resection (1991); other surgical history (2000); bone graft (2007); Dilation and curettage of uterus; Carpal tunnel release; LEEP; Arm Surgery; Knee arthroscopy; and Cardiac catheterization.    CURRENT  AMOXICILLIN-CLAVULANATE (AUGMENTIN) 875-125 MG PER TABLET    Take 1 tablet by mouth 2 times daily for 7 days       Discontinued Medications    No medications on file       Current Discharge Medication List          JAMES Dobbins CNP    (Please note that portions of this note were completed with a voice recognition program. Efforts were made to edit the dictations but occasionally words are mis-transcribed.)           Nixon Lilly, JAMES - CNP  02/18/25 1749       Nixon Lilly, APRN - CNP  02/18/25 1741

## 2025-03-03 ENCOUNTER — HOSPITAL ENCOUNTER (OUTPATIENT)
Age: 65
Discharge: HOME | End: 2025-03-03
Payer: MEDICARE

## 2025-03-03 DIAGNOSIS — M45.0 ANKYLOSING SPONDYLITIS OF MULTIPLE SITES IN SPINE (HCC): ICD-10-CM

## 2025-03-03 DIAGNOSIS — E78.5 DYSLIPIDEMIA: ICD-10-CM

## 2025-03-03 DIAGNOSIS — E11.9 TYPE 2 DIABETES MELLITUS WITHOUT COMPLICATION, WITHOUT LONG-TERM CURRENT USE OF INSULIN (HCC): ICD-10-CM

## 2025-03-03 LAB
ALBUMIN SERPL BCG-MCNC: 4.5 G/DL (ref 3.4–4.9)
ALP SERPL-CCNC: 61 U/L (ref 35–104)
ALT SERPL W/O P-5'-P-CCNC: 18 U/L (ref 10–35)
ANION GAP SERPL CALC-SCNC: 13 MEQ/L (ref 8–16)
AST SERPL-CCNC: 22 U/L (ref 10–35)
BASOPHILS ABSOLUTE: 0 THOU/MM3 (ref 0–0.1)
BASOPHILS NFR BLD AUTO: 1 %
BILIRUB CONJ SERPL-MCNC: < 0.1 MG/DL (ref 0–0.2)
BILIRUB SERPL-MCNC: < 0.2 MG/DL (ref 0.3–1.2)
BUN SERPL-MCNC: 19 MG/DL (ref 8–23)
CALCIUM SERPL-MCNC: 9.3 MG/DL (ref 8.8–10.2)
CHLORIDE SERPL-SCNC: 103 MEQ/L (ref 98–111)
CHOLEST SERPL-MCNC: 134 MG/DL (ref 100–199)
CO2 SERPL-SCNC: 23 MEQ/L (ref 22–29)
CREAT SERPL-MCNC: 0.6 MG/DL (ref 0.5–0.9)
CRP SERPL-MCNC: < 0.3 MG/DL (ref 0–0.5)
DEPRECATED RDW RBC AUTO: 42.5 FL (ref 35–45)
EOSINOPHIL NFR BLD AUTO: 2.6 %
EOSINOPHILS ABSOLUTE: 0.1 THOU/MM3 (ref 0–0.4)
ERYTHROCYTE [DISTWIDTH] IN BLOOD BY AUTOMATED COUNT: 12.9 % (ref 11.5–14.5)
ERYTHROCYTE [SEDIMENTATION RATE] IN BLOOD BY WESTERGREN METHOD: 0 MM/HR (ref 0–20)
GFR SERPL CREATININE-BSD FRML MDRD: > 90 ML/MIN/1.73M2
GLUCOSE SERPL-MCNC: 134 MG/DL (ref 74–109)
HCT VFR BLD AUTO: 42.4 % (ref 37–47)
HDLC SERPL-MCNC: 38 MG/DL
HGB BLD-MCNC: 13.9 GM/DL (ref 12–16)
IGG SERPL-MCNC: 848 MG/DL (ref 700–1600)
IMM GRANULOCYTES # BLD AUTO: 0.01 THOU/MM3 (ref 0–0.07)
IMM GRANULOCYTES NFR BLD AUTO: 0.2 %
LDLC SERPL CALC-MCNC: 58 MG/DL
LYMPHOCYTES ABSOLUTE: 1.6 THOU/MM3 (ref 1–4.8)
LYMPHOCYTES NFR BLD AUTO: 38.7 %
MCH RBC QN AUTO: 30 PG (ref 26–33)
MCHC RBC AUTO-ENTMCNC: 32.8 GM/DL (ref 32.2–35.5)
MCV RBC AUTO: 91.4 FL (ref 81–99)
MONOCYTES ABSOLUTE: 0.3 THOU/MM3 (ref 0.4–1.3)
MONOCYTES NFR BLD AUTO: 7.6 %
NEUTROPHILS ABSOLUTE: 2.1 THOU/MM3 (ref 1.8–7.7)
NEUTROPHILS NFR BLD AUTO: 49.9 %
NRBC BLD AUTO-RTO: 0 /100 WBC
PLATELET # BLD AUTO: 225 THOU/MM3 (ref 130–400)
PMV BLD AUTO: 9.1 FL (ref 9.4–12.4)
POTASSIUM SERPL-SCNC: 3.9 MEQ/L (ref 3.5–5.2)
PROT SERPL-MCNC: 6.8 G/DL (ref 6.4–8.3)
RBC # BLD AUTO: 4.64 MILL/MM3 (ref 4.2–5.4)
SODIUM SERPL-SCNC: 139 MEQ/L (ref 135–145)
TRIGL SERPL-MCNC: 190 MG/DL (ref 0–199)
WBC # BLD AUTO: 4.2 THOU/MM3 (ref 4.8–10.8)

## 2025-03-03 PROCEDURE — 86140 C-REACTIVE PROTEIN: CPT

## 2025-03-03 PROCEDURE — 36415 COLL VENOUS BLD VENIPUNCTURE: CPT

## 2025-03-03 PROCEDURE — 82248 BILIRUBIN DIRECT: CPT

## 2025-03-03 PROCEDURE — 80053 COMPREHEN METABOLIC PANEL: CPT

## 2025-03-03 PROCEDURE — 86256 FLUORESCENT ANTIBODY TITER: CPT

## 2025-03-03 PROCEDURE — 86225 DNA ANTIBODY NATIVE: CPT

## 2025-03-03 PROCEDURE — 82784 ASSAY IGA/IGD/IGG/IGM EACH: CPT

## 2025-03-03 PROCEDURE — 85651 RBC SED RATE NONAUTOMATED: CPT

## 2025-03-03 PROCEDURE — 86038 ANTINUCLEAR ANTIBODIES: CPT

## 2025-03-03 PROCEDURE — 86235 NUCLEAR ANTIGEN ANTIBODY: CPT

## 2025-03-03 PROCEDURE — 80061 LIPID PANEL: CPT

## 2025-03-03 PROCEDURE — 85025 COMPLETE CBC W/AUTO DIFF WBC: CPT

## 2025-03-03 RX ORDER — EMPAGLIFLOZIN 10 MG/1
10 TABLET, FILM COATED ORAL DAILY
Qty: 30 TABLET | Refills: 1 | Status: SHIPPED | OUTPATIENT
Start: 2025-03-03

## 2025-03-03 NOTE — TELEPHONE ENCOUNTER
Patient of Dr. Pereira     Last ordered 11/11/24, disp 30, reifll 3    Last visit 11/11:    Re DM-2 . Had eye exam and retinal specialist every 6- 12 months .  Increase metformin to 1000 mg in am and 500 mg evening remains the same. As her insurance does not cover farxiga, changed to Jardiance 10 mg daily and check BMP  In 3-4 weeks, and increase to 25 mg daily as we need to   Optimize her blood sugars, may need insulin if sugars don't improve with the above regimen     Next visit 4/11

## 2025-03-04 ENCOUNTER — HOSPITAL ENCOUNTER (EMERGENCY)
Age: 65
Discharge: HOME | End: 2025-03-04
Payer: MEDICARE

## 2025-03-04 VITALS
SYSTOLIC BLOOD PRESSURE: 120 MMHG | BODY MASS INDEX: 29.95 KG/M2 | TEMPERATURE: 97.9 F | RESPIRATION RATE: 20 BRPM | HEART RATE: 84 BPM | OXYGEN SATURATION: 98 % | DIASTOLIC BLOOD PRESSURE: 72 MMHG | WEIGHT: 180 LBS

## 2025-03-04 DIAGNOSIS — J01.40 ACUTE NON-RECURRENT PANSINUSITIS: Primary | ICD-10-CM

## 2025-03-04 DIAGNOSIS — H65.193 ACUTE MEE (MIDDLE EAR EFFUSION), BILATERAL: ICD-10-CM

## 2025-03-04 PROCEDURE — 99213 OFFICE O/P EST LOW 20 MIN: CPT

## 2025-03-04 RX ORDER — DOXYCYCLINE HYCLATE 100 MG
100 TABLET ORAL 2 TIMES DAILY
Qty: 20 TABLET | Refills: 0 | Status: SHIPPED | OUTPATIENT
Start: 2025-03-04 | End: 2025-03-14

## 2025-03-04 RX ORDER — PREDNISONE 20 MG/1
20 TABLET ORAL 2 TIMES DAILY
Qty: 10 TABLET | Refills: 0 | Status: SHIPPED | OUTPATIENT
Start: 2025-03-04 | End: 2025-03-09

## 2025-03-04 ASSESSMENT — ENCOUNTER SYMPTOMS
SINUS PRESSURE: 1
SINUS PAIN: 1

## 2025-03-04 ASSESSMENT — PAIN - FUNCTIONAL ASSESSMENT: PAIN_FUNCTIONAL_ASSESSMENT: NONE - DENIES PAIN

## 2025-03-04 NOTE — ED PROVIDER NOTES
[facility]  Urgent Care Encounter       CHIEF COMPLAINT       Chief Complaint   Patient presents with    Head Congestion     Sinus pressure, (B) ear pain         Nurses Notes reviewed and I agree except as noted in the HPI.  HISTORY OF PRESENT ILLNESS   Vicki Solano is a 64 y.o. female who presents with complaints of head congestion, sinus pressure, ear pain, sinus pain that started two months ago. Pt reports that she was seen last month and given augmentin for 7 days. Pt reports that this helped at the time but did not take it away fully. Pt reports taking xyzal, flonase, and mucinex. Pt reports bilateral lear pain and pressure. Pt denies chest pain, shortness of breath, fevers, or fatigue.     The history is provided by the patient.       REVIEW OF SYSTEMS     Review of Systems   HENT:  Positive for congestion, ear pain, sinus pressure and sinus pain.    Neurological:  Positive for headaches.   All other systems reviewed and are negative.      PAST MEDICAL HISTORY         Diagnosis Date    Anemia     Backache     Depression     Diabetes mellitus (HCC)     Eczema     Fibromyalgia     Fracture     Hyperlipidemia     Hypertension     Hypothyroidism     Metabolic syndrome     Migraine     Sinusitis     Sleep apnea        SURGICALHISTORY     Patient  has a past surgical history that includes Tonsillectomy (1962); Cholecystectomy (2003); turbinate resection (1991); other surgical history (2000); bone graft (2007); Dilation and curettage of uterus; Carpal tunnel release; LEEP; Arm Surgery; Knee arthroscopy; and Cardiac catheterization.    CURRENT MEDICATIONS       Previous Medications    ANUCORT-HC 25 MG SUPPOSITORY    INSERT 1 SUPPOSITORY RECTALLY ONCE DAILY AS NEEDED FOR HEMORRHOIDS    ARTIFICIAL TEARS (ARTIFICIAL TEARS) OINT    4 times daily Gen Teal    ASPIRIN 81 MG TABLET    Take 1 tablet by mouth daily    ATENOLOL (TENORMIN) 50 MG TABLET    Take 1 tablet by mouth daily    B COMPLEX VITAMINS  METFORMIN (GLUCOPHAGE) 1000 MG TABLET    Change to one tab BID    MISC. DEVICES (CPAP MACHINE) MISC    by Does not apply route Please change her current CPAP/BiPAP pressure to a CPAP Auto Adjust 10 - 15 cm H2O    Please pull download in 2 weeks    MULTIPLE VITAMIN (MULTI-VITAMIN PO)    Take  by mouth daily.      PROBIOTIC PRODUCT (PROBIOTIC DAILY PO)    Take by mouth    REPATHA SURECLICK 140 MG/ML SOAJ        RESPIRATORY THERAPY SUPPLIES (ADULT MASK) MISC    Please do mask refit and provide mask of patient's choice. She is interested in Full face mask to minimize leaks.I recommend Ruchi view mask.    SULFASALAZINE (AZULFIDINE) 500 MG TABLET    Take 1 tablet by mouth 2 times daily    VALSARTAN (DIOVAN) 160 MG TABLET    Take 1 tablet by mouth 2 times daily    VENLAFAXINE (EFFEXOR XR) 150 MG EXTENDED RELEASE CAPSULE    TAKE 1 CAPSULE ONCE DAILY  WITH FOOD    VENLAFAXINE (EFFEXOR) 75 MG TABLET    Take 1 tablet by mouth daily with food       ALLERGIES     Patient is is allergic to phenylephrine, adhesive tape, lipitor [atorvastatin calcium], mepergan [meperidine-promethazine], motrin [ibuprofen micronized], viibryd [vilazodone hcl], and vilazodone.    Patients   Immunization History   Administered Date(s) Administered    COVID-19, MODERNA BLUE border, Primary or Immunocompromised, (age 12y+), IM, 100 mcg/0.5mL 04/23/2021, 05/21/2021, 12/27/2021    COVID-19, MODERNA, (age 12y+), IM, 50mcg/0.5mL 10/11/2023, 09/13/2024    COVID-19, PFIZER Bivalent, DO NOT Dilute, (age 12y+), IM, 30 mcg/0.3 mL 01/10/2023    Hep A-Hep B, TWINRIX, (age 18y+), IM, 1mL 08/05/2008    Influenza Vaccine, unspecified formulation 10/01/2016    Influenza Virus Vaccine 09/23/2019    Influenza, FLUARIX, FLULAVAL, FLUZONE (age 6 mo+) and AFLURIA, (age 3 y+), Quadv PF, 0.5mL 09/15/2020, 01/04/2022, 09/19/2022    Influenza, FLUCELVAX, (age 6 mo+), MDCK, Quadv PF, 0.5mL 10/09/2018    Influenza, FLUZONE High Dose (age 65 y+), IM, Quadv, 0.7mL 09/05/2023

## 2025-03-04 NOTE — ED TRIAGE NOTES
Patient to room with c/o continued head congestion, sinus pressure, and bilateral ear pain beginning two months ago. Completed previously prescribed oral antibiotics one week ago, with minimal improvement.

## 2025-03-04 NOTE — DISCHARGE INSTRUCTIONS
Doxycylcine 2x a day for 10 days  Prednisone 2x a day for 5 days  Xyzal daily  Flonase daily  Mucinex  Follow up with PCP in a week for recheck

## 2025-03-05 LAB
DSDNA IGG SER QL IA: NORMAL
ENA SM IGG SER-ACNC: 3 AU/ML (ref 0–40)
ENA SS-A 60KD AB SER-ACNC: NORMAL
ENA SS-A IGG SER QL: NORMAL
ENA SS-B IGG SER IA-ACNC: 1 AU/ML (ref 0–40)
NUCLEAR IGG SER QL IA: NORMAL
U1 SNRNP IGG SER-ACNC: 4 UNITS (ref 0–19)

## 2025-03-07 LAB — DSDNA IGG TITR SER CLIF: NORMAL {TITER}

## 2025-03-19 ENCOUNTER — OFFICE VISIT (OUTPATIENT)
Age: 65
End: 2025-03-19
Payer: MEDICARE

## 2025-03-19 VITALS
HEART RATE: 88 BPM | WEIGHT: 179.9 LBS | DIASTOLIC BLOOD PRESSURE: 84 MMHG | SYSTOLIC BLOOD PRESSURE: 130 MMHG | BODY MASS INDEX: 29.97 KG/M2 | HEIGHT: 65 IN | OXYGEN SATURATION: 96 %

## 2025-03-19 DIAGNOSIS — R53.83 FATIGUE, UNSPECIFIED TYPE: ICD-10-CM

## 2025-03-19 DIAGNOSIS — Z11.1 SCREENING-PULMONARY TB: Primary | ICD-10-CM

## 2025-03-19 DIAGNOSIS — M45.0 ANKYLOSING SPONDYLITIS OF MULTIPLE SITES IN SPINE (HCC): ICD-10-CM

## 2025-03-19 PROCEDURE — 3075F SYST BP GE 130 - 139MM HG: CPT | Performed by: INTERNAL MEDICINE

## 2025-03-19 PROCEDURE — G8399 PT W/DXA RESULTS DOCUMENT: HCPCS | Performed by: INTERNAL MEDICINE

## 2025-03-19 PROCEDURE — 1159F MED LIST DOCD IN RCRD: CPT | Performed by: INTERNAL MEDICINE

## 2025-03-19 PROCEDURE — 1090F PRES/ABSN URINE INCON ASSESS: CPT | Performed by: INTERNAL MEDICINE

## 2025-03-19 PROCEDURE — 99214 OFFICE O/P EST MOD 30 MIN: CPT | Performed by: INTERNAL MEDICINE

## 2025-03-19 PROCEDURE — 1123F ACP DISCUSS/DSCN MKR DOCD: CPT | Performed by: INTERNAL MEDICINE

## 2025-03-19 PROCEDURE — 1125F AMNT PAIN NOTED PAIN PRSNT: CPT | Performed by: INTERNAL MEDICINE

## 2025-03-19 PROCEDURE — G8417 CALC BMI ABV UP PARAM F/U: HCPCS | Performed by: INTERNAL MEDICINE

## 2025-03-19 PROCEDURE — G8427 DOCREV CUR MEDS BY ELIG CLIN: HCPCS | Performed by: INTERNAL MEDICINE

## 2025-03-19 PROCEDURE — 99213 OFFICE O/P EST LOW 20 MIN: CPT | Performed by: INTERNAL MEDICINE

## 2025-03-19 PROCEDURE — 1036F TOBACCO NON-USER: CPT | Performed by: INTERNAL MEDICINE

## 2025-03-19 PROCEDURE — 3079F DIAST BP 80-89 MM HG: CPT | Performed by: INTERNAL MEDICINE

## 2025-03-19 PROCEDURE — 3017F COLORECTAL CA SCREEN DOC REV: CPT | Performed by: INTERNAL MEDICINE

## 2025-03-19 RX ORDER — CELECOXIB 200 MG/1
200 CAPSULE ORAL DAILY
Qty: 30 CAPSULE | Refills: 2 | Status: SHIPPED | OUTPATIENT
Start: 2025-03-19

## 2025-03-19 RX ORDER — RIMEGEPANT SULFATE 75 MG/75MG
TABLET, ORALLY DISINTEGRATING ORAL
COMMUNITY
Start: 2025-02-28

## 2025-03-19 ASSESSMENT — ENCOUNTER SYMPTOMS
SHORTNESS OF BREATH: 0
ABDOMINAL PAIN: 0
NAUSEA: 0
VOMITING: 0
COUGH: 0

## 2025-03-19 NOTE — PROGRESS NOTES
[facility]   Rheumatology Clinic Note      3/19/2025         CHIEF COMPLAINT:    Chief Complaint   Patient presents with    Follow-up     6 week follow up Ankylosing spondylitis of multiple sites in spine (HCC)    Joint Pain     Neck Pain, Lower back pain, knee pain, hand pain.  Pain level 6           HISTORY OF PRESENT ILLNESS:    65 y.o. female akylosing spondylitis (inflammatory back pain, +HLA B27, sclerotic changes on XR SI joints) presents for follow up. Presently, she is on Celebrex 200 mg daily and sulfasalazine 500 mg bid (started last visit).    Reports worsening joint pain since starting sulfasalazine. Pain is most pronounced in her shoulders, neck, low back and hands. Rates it at 6/10 in pain scale. Has prolonged morning stiffness that lasts for over an hour.     No skin rash. No psoriasis.  Has chronic diarrhea. Had recent colonoscopy that was negative.      Past Medical History:     has a past medical history of Anemia, Backache, Depression, Diabetes mellitus (HCC), Eczema, Fibromyalgia, Fracture, Hyperlipidemia, Hypertension, Hypothyroidism, Metabolic syndrome, Migraine, Sinusitis, and Sleep apnea.    Past Surgical History:     has a past surgical history that includes Tonsillectomy (1962); Cholecystectomy (2003); turbinate resection (1991); other surgical history (2000); bone graft (2007); Dilation and curettage of uterus; Carpal tunnel release; LEEP; Arm Surgery; Knee arthroscopy; and Cardiac catheterization.    Current Medications:      Current Outpatient Medications:     NURTEC 75 MG TBDP, PLACE 1 TABLET ON THE      TONGUE AND ALLOW TO        DISSOLVE EVERY OTHER DAY, Disp: , Rfl:     celecoxib (CELEBREX) 200 MG capsule, Take 1 capsule by mouth daily, Disp: 30 capsule, Rfl: 2    JARDIANCE 10 MG tablet, Take 1 tablet by mouth once daily, Disp: 30 tablet, Rfl: 1    ANUCORT-HC 25 MG suppository, INSERT 1 SUPPOSITORY RECTALLY ONCE DAILY AS NEEDED FOR HEMORRHOIDS, Disp: , Rfl:

## 2025-03-24 ENCOUNTER — HOSPITAL ENCOUNTER (OUTPATIENT)
Age: 65
Discharge: HOME | End: 2025-03-24
Payer: MEDICARE

## 2025-03-24 DIAGNOSIS — R53.83 FATIGUE, UNSPECIFIED TYPE: ICD-10-CM

## 2025-03-24 DIAGNOSIS — Z11.1 SCREENING-PULMONARY TB: ICD-10-CM

## 2025-03-24 LAB
HAV IGM SER QL: NONREACTIVE
HBV CORE IGM SERPL QL IA: NONREACTIVE
HBV SURFACE AG SERPL QL IA: NONREACTIVE
HCV IGG SERPL QL IA: NONREACTIVE

## 2025-03-24 PROCEDURE — 36415 COLL VENOUS BLD VENIPUNCTURE: CPT

## 2025-03-24 PROCEDURE — 86480 TB TEST CELL IMMUN MEASURE: CPT

## 2025-03-24 PROCEDURE — 80074 ACUTE HEPATITIS PANEL: CPT

## 2025-03-26 LAB
GAMMA INTERFERON BACKGROUND BLD IA-ACNC: 0.03 IU/ML
M TB IFN-G BLD-IMP: NEGATIVE
M TB IFN-G CD4+ BCKGRND COR BLD-ACNC: 0.01 IU/ML
M TB IFN-G CD4+CD8+ BCKGRND COR BLD-ACNC: 0.02 IU/ML
MITOGEN IGNF BCKGRD COR BLD-ACNC: 9.97 IU/ML

## 2025-03-27 DIAGNOSIS — M45.0 ANKYLOSING SPONDYLITIS OF MULTIPLE SITES IN SPINE (HCC): Primary | ICD-10-CM

## 2025-03-27 RX ORDER — IXEKIZUMAB 80 MG/ML
80 INJECTION, SOLUTION SUBCUTANEOUS
Qty: 1 ML | Refills: 5 | Status: SHIPPED | OUTPATIENT
Start: 2025-03-27

## 2025-03-31 ENCOUNTER — TELEPHONE (OUTPATIENT)
Age: 65
End: 2025-03-31

## 2025-03-31 DIAGNOSIS — M45.0 ANKYLOSING SPONDYLITIS OF MULTIPLE SITES IN SPINE (HCC): Primary | ICD-10-CM

## 2025-03-31 RX ORDER — SECUKINUMAB 150 MG/ML
INJECTION SUBCUTANEOUS
Qty: 5 ML | Refills: 0 | Status: ACTIVE | OUTPATIENT
Start: 2025-03-31 | End: 2025-04-01

## 2025-03-31 RX ORDER — SECUKINUMAB 150 MG/ML
150 INJECTION SUBCUTANEOUS
Qty: 1 ML | Refills: 5 | Status: ACTIVE | OUTPATIENT
Start: 2025-03-31 | End: 2025-04-01

## 2025-03-31 NOTE — TELEPHONE ENCOUNTER
----- Message from [facility] sent at 3/27/2025  3:43 PM EDT -----  Regarding: FW: Taltz    ----- Message -----  From: Antonio Main  Sent: 3/27/2025   3:38 PM EDT  To: [facility]  Subject: Frida Griffith there,    PA for TALTZ has denied - I uploaded the denial letter into patient media.    In the denial letter it gives recommended alternatives including: Tremfya, Skyrizi, Cosentyx, Enbrel, and humira along with the various restrictions each one has with the plan    Would you like to consider switching?    Let me know - THANKS

## 2025-03-31 NOTE — TELEPHONE ENCOUNTER
----- Message from Antonio BLACK sent at 3/31/2025  2:20 PM EDT -----  Regarding: Cosentyx  Hey there,    Patients plan has denied the second PA for this member. Cosentyx is an approved alternatives but now requires step therapy of 1 other preferred product.     I uploaded the denial letter into the media and it gives us 8 other options to choose from - would you like to change therapies?    Adalimumab-aacf, enbrel, humira, idacio, skyrizi, sotyktu, stelara, tremfya

## 2025-03-31 NOTE — TELEPHONE ENCOUNTER
Please inform pt that insurance denied coverage for Taltz and Cosentyx, but would cover adalimumab-aac, which is a Humira biosimilar. If she does not object, will switch to the latter treatment plan.    This medication is a self-injection given every 2 weeks

## 2025-04-07 SDOH — ECONOMIC STABILITY: FOOD INSECURITY: WITHIN THE PAST 12 MONTHS, YOU WORRIED THAT YOUR FOOD WOULD RUN OUT BEFORE YOU GOT MONEY TO BUY MORE.: NEVER TRUE

## 2025-04-07 SDOH — ECONOMIC STABILITY: INCOME INSECURITY: IN THE LAST 12 MONTHS, WAS THERE A TIME WHEN YOU WERE NOT ABLE TO PAY THE MORTGAGE OR RENT ON TIME?: NO

## 2025-04-07 SDOH — ECONOMIC STABILITY: TRANSPORTATION INSECURITY
IN THE PAST 12 MONTHS, HAS LACK OF TRANSPORTATION KEPT YOU FROM MEETINGS, WORK, OR FROM GETTING THINGS NEEDED FOR DAILY LIVING?: NO

## 2025-04-07 SDOH — ECONOMIC STABILITY: FOOD INSECURITY: WITHIN THE PAST 12 MONTHS, THE FOOD YOU BOUGHT JUST DIDN'T LAST AND YOU DIDN'T HAVE MONEY TO GET MORE.: NEVER TRUE

## 2025-04-07 SDOH — ECONOMIC STABILITY: TRANSPORTATION INSECURITY
IN THE PAST 12 MONTHS, HAS THE LACK OF TRANSPORTATION KEPT YOU FROM MEDICAL APPOINTMENTS OR FROM GETTING MEDICATIONS?: NO

## 2025-04-07 ASSESSMENT — PATIENT HEALTH QUESTIONNAIRE - PHQ9
SUM OF ALL RESPONSES TO PHQ QUESTIONS 1-9: 12
8. MOVING OR SPEAKING SO SLOWLY THAT OTHER PEOPLE COULD HAVE NOTICED. OR THE OPPOSITE, BEING SO FIGETY OR RESTLESS THAT YOU HAVE BEEN MOVING AROUND A LOT MORE THAN USUAL: SEVERAL DAYS
8. MOVING OR SPEAKING SO SLOWLY THAT OTHER PEOPLE COULD HAVE NOTICED. OR THE OPPOSITE - BEING SO FIDGETY OR RESTLESS THAT YOU HAVE BEEN MOVING AROUND A LOT MORE THAN USUAL: SEVERAL DAYS
10. IF YOU CHECKED OFF ANY PROBLEMS, HOW DIFFICULT HAVE THESE PROBLEMS MADE IT FOR YOU TO DO YOUR WORK, TAKE CARE OF THINGS AT HOME, OR GET ALONG WITH OTHER PEOPLE: SOMEWHAT DIFFICULT
2. FEELING DOWN, DEPRESSED OR HOPELESS: SEVERAL DAYS
9. THOUGHTS THAT YOU WOULD BE BETTER OFF DEAD, OR OF HURTING YOURSELF: NOT AT ALL
7. TROUBLE CONCENTRATING ON THINGS, SUCH AS READING THE NEWSPAPER OR WATCHING TELEVISION: MORE THAN HALF THE DAYS
5. POOR APPETITE OR OVEREATING: MORE THAN HALF THE DAYS
4. FEELING TIRED OR HAVING LITTLE ENERGY: NEARLY EVERY DAY
7. TROUBLE CONCENTRATING ON THINGS, SUCH AS READING THE NEWSPAPER OR WATCHING TELEVISION: MORE THAN HALF THE DAYS
1. LITTLE INTEREST OR PLEASURE IN DOING THINGS: SEVERAL DAYS
3. TROUBLE FALLING OR STAYING ASLEEP: SEVERAL DAYS
10. IF YOU CHECKED OFF ANY PROBLEMS, HOW DIFFICULT HAVE THESE PROBLEMS MADE IT FOR YOU TO DO YOUR WORK, TAKE CARE OF THINGS AT HOME, OR GET ALONG WITH OTHER PEOPLE: SOMEWHAT DIFFICULT
6. FEELING BAD ABOUT YOURSELF - OR THAT YOU ARE A FAILURE OR HAVE LET YOURSELF OR YOUR FAMILY DOWN: SEVERAL DAYS
SUM OF ALL RESPONSES TO PHQ QUESTIONS 1-9: 12
9. THOUGHTS THAT YOU WOULD BE BETTER OFF DEAD, OR OF HURTING YOURSELF: NOT AT ALL
SUM OF ALL RESPONSES TO PHQ QUESTIONS 1-9: 12
SUM OF ALL RESPONSES TO PHQ QUESTIONS 1-9: 12
1. LITTLE INTEREST OR PLEASURE IN DOING THINGS: SEVERAL DAYS
2. FEELING DOWN, DEPRESSED OR HOPELESS: SEVERAL DAYS
SUM OF ALL RESPONSES TO PHQ QUESTIONS 1-9: 12
4. FEELING TIRED OR HAVING LITTLE ENERGY: NEARLY EVERY DAY
5. POOR APPETITE OR OVEREATING: MORE THAN HALF THE DAYS
6. FEELING BAD ABOUT YOURSELF - OR THAT YOU ARE A FAILURE OR HAVE LET YOURSELF OR YOUR FAMILY DOWN: SEVERAL DAYS
3. TROUBLE FALLING OR STAYING ASLEEP: SEVERAL DAYS

## 2025-04-09 ENCOUNTER — OFFICE VISIT (OUTPATIENT)
Dept: INTERNAL MEDICINE CLINIC | Age: 65
End: 2025-04-09

## 2025-04-09 VITALS
SYSTOLIC BLOOD PRESSURE: 134 MMHG | WEIGHT: 183.6 LBS | DIASTOLIC BLOOD PRESSURE: 70 MMHG | OXYGEN SATURATION: 99 % | TEMPERATURE: 97 F | HEART RATE: 86 BPM | BODY MASS INDEX: 30.55 KG/M2

## 2025-04-09 DIAGNOSIS — E03.8 OTHER SPECIFIED HYPOTHYROIDISM: ICD-10-CM

## 2025-04-09 DIAGNOSIS — E11.9 TYPE 2 DIABETES MELLITUS WITHOUT COMPLICATION, UNSPECIFIED WHETHER LONG TERM INSULIN USE: Primary | ICD-10-CM

## 2025-04-09 DIAGNOSIS — E11.9 TYPE 2 DIABETES MELLITUS WITHOUT COMPLICATION, WITHOUT LONG-TERM CURRENT USE OF INSULIN: ICD-10-CM

## 2025-04-09 RX ORDER — FENOFIBRATE 145 MG/1
145 TABLET, COATED ORAL DAILY
Qty: 90 TABLET | Refills: 1 | Status: SHIPPED | OUTPATIENT
Start: 2025-04-09

## 2025-04-09 RX ORDER — LEVOTHYROXINE SODIUM 100 UG/1
TABLET ORAL
Qty: 90 TABLET | Refills: 1 | Status: SHIPPED | OUTPATIENT
Start: 2025-04-09

## 2025-04-09 NOTE — PROGRESS NOTES
unspecified whether long term insulin use        2. Type 2 diabetes mellitus without complication, without long-term current use of insulin        3. Other specified hypothyroidism           Recent labs from 10/24 noted       PLAN:    DM-2  Well controlled on the current regimen, on trulicity 4.5 mg per week and on glucophage, no active GI issues. Will obtain CMP in about a month she follows with  Dr. Roper at [facility] for her CAD. Pt was given education material . I currently on trulicity to 4.5 mg and  Jardiance 10 mg daily.     Had eye exam and retinal specialist every 6- 12 months .  On metformin 1000 mg 1 tab twice daily as her insurance does not cover farxiga, so on Jardiance 10 mg daily and last BMP  Noted, Optimize her blood sugars, may need insulin if sugars don't improve with the above regimen last urine for microalb was on 10/24 . She does see dr. Peralta at [facility] Q 6 months. No change in Jardiance at this time, based on her sugars     Hemoglobin A1C   Date Value Ref Range Status   10/29/2024 7.8 (H) 4.4 - 6.4 % Final      And today down to 6.8 %           Hypothyroidism - will lower the thyroid supplements   Due to low TSH,  on  levothryoxine to 100 mcg  Daily in am and re check  labs     She does follow up with annual eye exam,  dr. Maddox , Retinal specialist.     HTN is well controlled  diovan to 320 mg 1/2 tab BID  And recommend she checks BP at home.     Dyslipidemia  on Repatha , followed by dr. Roper. Due to high TG will add fenofibrate 145 mg daily     DEBBIE - she admits to using the CPAP.     She does have labs to be done in summer ordered by dr. Otoniel Peralta.      RTO  6 months    Signed by : Navin Pereira M.D.    [facility]  [address], [address]  [place] [address]    Tel  [phone]  Fax [phone]

## 2025-05-18 DIAGNOSIS — E11.9 TYPE 2 DIABETES MELLITUS WITHOUT COMPLICATION, WITHOUT LONG-TERM CURRENT USE OF INSULIN (HCC): ICD-10-CM

## 2025-05-19 RX ORDER — DULAGLUTIDE 4.5 MG/.5ML
INJECTION, SOLUTION SUBCUTANEOUS
Qty: 4 ML | Refills: 5 | Status: SHIPPED | OUTPATIENT
Start: 2025-05-19

## 2025-06-24 ENCOUNTER — OFFICE VISIT (OUTPATIENT)
Age: 65
End: 2025-06-24
Payer: MEDICARE

## 2025-06-24 VITALS
HEART RATE: 82 BPM | SYSTOLIC BLOOD PRESSURE: 122 MMHG | OXYGEN SATURATION: 98 % | DIASTOLIC BLOOD PRESSURE: 78 MMHG | BODY MASS INDEX: 30.62 KG/M2 | WEIGHT: 183.8 LBS | HEIGHT: 65 IN

## 2025-06-24 DIAGNOSIS — M45.0 ANKYLOSING SPONDYLITIS OF MULTIPLE SITES IN SPINE (HCC): ICD-10-CM

## 2025-06-24 PROCEDURE — 99214 OFFICE O/P EST MOD 30 MIN: CPT | Performed by: INTERNAL MEDICINE

## 2025-06-24 PROCEDURE — 1090F PRES/ABSN URINE INCON ASSESS: CPT | Performed by: INTERNAL MEDICINE

## 2025-06-24 PROCEDURE — 1036F TOBACCO NON-USER: CPT | Performed by: INTERNAL MEDICINE

## 2025-06-24 PROCEDURE — 3074F SYST BP LT 130 MM HG: CPT | Performed by: INTERNAL MEDICINE

## 2025-06-24 PROCEDURE — 1123F ACP DISCUSS/DSCN MKR DOCD: CPT | Performed by: INTERNAL MEDICINE

## 2025-06-24 PROCEDURE — G8417 CALC BMI ABV UP PARAM F/U: HCPCS | Performed by: INTERNAL MEDICINE

## 2025-06-24 PROCEDURE — 99213 OFFICE O/P EST LOW 20 MIN: CPT | Performed by: INTERNAL MEDICINE

## 2025-06-24 PROCEDURE — G2211 COMPLEX E/M VISIT ADD ON: HCPCS | Performed by: INTERNAL MEDICINE

## 2025-06-24 PROCEDURE — G8399 PT W/DXA RESULTS DOCUMENT: HCPCS | Performed by: INTERNAL MEDICINE

## 2025-06-24 PROCEDURE — 3078F DIAST BP <80 MM HG: CPT | Performed by: INTERNAL MEDICINE

## 2025-06-24 PROCEDURE — 3017F COLORECTAL CA SCREEN DOC REV: CPT | Performed by: INTERNAL MEDICINE

## 2025-06-24 PROCEDURE — 1125F AMNT PAIN NOTED PAIN PRSNT: CPT | Performed by: INTERNAL MEDICINE

## 2025-06-24 PROCEDURE — 1159F MED LIST DOCD IN RCRD: CPT | Performed by: INTERNAL MEDICINE

## 2025-06-24 PROCEDURE — G8427 DOCREV CUR MEDS BY ELIG CLIN: HCPCS | Performed by: INTERNAL MEDICINE

## 2025-06-24 RX ORDER — CELECOXIB 200 MG/1
200 CAPSULE ORAL 2 TIMES DAILY
Qty: 60 CAPSULE | Refills: 3 | Status: SHIPPED | OUTPATIENT
Start: 2025-06-24

## 2025-06-24 RX ORDER — DESVENLAFAXINE 100 MG/1
100 TABLET, EXTENDED RELEASE ORAL DAILY
COMMUNITY
Start: 2025-06-16

## 2025-06-24 ASSESSMENT — ENCOUNTER SYMPTOMS
SHORTNESS OF BREATH: 0
VOMITING: 0
COUGH: 0
NAUSEA: 0
ABDOMINAL PAIN: 0

## 2025-06-24 ASSESSMENT — JOINT PAIN
TOTAL NUMBER OF SWOLLEN JOINTS: 0
TOTAL NUMBER OF TENDER JOINTS: 11

## 2025-06-24 NOTE — PROGRESS NOTES
[facility]   Rheumatology Clinic Note      6/24/2025         CHIEF COMPLAINT:    Chief Complaint   Patient presents with    3 Month Follow-Up     Ankylosing spondylitis of multiple sites in spine (HCC)    Joint Pain     Generalized Joint Pain.  Pain level 6         HISTORY OF PRESENT ILLNESS:    65 y.o. female akylosing spondylitis (inflammatory back pain, +HLA B27, sclerotic changes on XR SI joints) presents for follow up. Presently, she is on adalimumab biosimilar (started April 10, 2025) and Celebrex 200 mg daily.  Past med: sulfasalazine (ineffective)    Has not noticed any improvement in her pain since starting the adalimumab treatment. Has been experiencing more pain that she attributes to long car rides. Pain is most pronounced in her neck, but also involving her mid back, lower back, hands and left knee/ankle. Morning stiffness is prolonged. No skin rashes.  Celebrex provides temporary relief.    States that the chronic diarrhea she was experiencing resolved since starting adalimumab.    No skin rash. No psoriasis.  Has chronic diarrhea. Had recent colonoscopy that was negative.      Past Medical History:     has a past medical history of Anemia, Backache, Depression, Diabetes mellitus (HCC), Eczema, Fibromyalgia, Fracture, Hyperlipidemia, Hypertension, Hypothyroidism, Metabolic syndrome, Migraine, Sinusitis, and Sleep apnea.    Past Surgical History:     has a past surgical history that includes Tonsillectomy (1962); Cholecystectomy (2003); turbinate resection (1991); other surgical history (2000); bone graft (2007); Dilation and curettage of uterus; Carpal tunnel release; LEEP; Arm Surgery; Knee arthroscopy; and Cardiac catheterization.    Current Medications:      Current Outpatient Medications:     desvenlafaxine succinate (PRISTIQ) 100 MG TB24 extended release tablet, Take 1 tablet by mouth daily, Disp: , Rfl:     celecoxib (CELEBREX) 200 MG capsule, Take 1 capsule by mouth 2 times daily,

## 2025-07-21 ENCOUNTER — TELEPHONE (OUTPATIENT)
Dept: INTERNAL MEDICINE CLINIC | Age: 65
End: 2025-07-21

## 2025-07-22 NOTE — PROGRESS NOTES
[facility]      Vicki Solano         [id number]  7/23/2025   Chief Complaint   Patient presents with    Follow-up     Mike 6 mo f/u with download        Assessment/Plan      Diagnosis Orders   1. MIKE on CPAP  DME Order for CPAP as OP    Misc. Devices (CPAP MACHINE) MISC    DME Order for CPAP as OP      2. Obesity (BMI 30-39.9)             Change pressure to 11-15 cmh2o for comfort  Mask refit ordered.  Patient provided with F&P vitera FFM cushion size small to trial.  Notify DME if you would like to continue wearing this mask.    -Yearly supply order placed? Yes   -Download was personally reviewed and discussed with patient at length.  -The symptoms of MIKE have improved. The patient is benefiting from therapy and should continue use of PAP device.  -Patient's symptoms and AHI are controlled with current settings of 10-15 cmH2O.  -Advised to keep good compliance with current recommended pressure to get optimal results and clinical improvement  -Recommend 7-9 hours of sleep with PAP  -Instructed to call DME company regarding supplies if needed.   -Patient to call my office for earlier appointment if needed for worsening of sleep symptoms.   -Patient is not to drive if feeling sleepy  -Counseled patient on weight loss    Educated about my impression and plan. Patient verbalizes understanding.      Return in about 1 year (around 7/23/2026) for mike. with download.      Subjective   Presentation:   Vicki presents for sleep medicine follow up for obstructive sleep apnea.  Since the last visit, Vicki has been doing very well on PAP therapy and reports good benefit from compliant use of PAP machine.  Pressure is too low initially wants this increased.  Usually takes a nap most days after getting 8-10 hours of sleep at night.   Patient's mother passed away in May 2025.     No significant medical changes since last visit.   Was treated for sinus infection 2x since last visit.

## 2025-07-23 ENCOUNTER — OFFICE VISIT (OUTPATIENT)
Age: 65
End: 2025-07-23
Payer: MEDICARE

## 2025-07-23 VITALS
OXYGEN SATURATION: 97 % | WEIGHT: 185.2 LBS | SYSTOLIC BLOOD PRESSURE: 128 MMHG | HEART RATE: 80 BPM | DIASTOLIC BLOOD PRESSURE: 62 MMHG | TEMPERATURE: 97.8 F | HEIGHT: 65 IN | BODY MASS INDEX: 30.86 KG/M2

## 2025-07-23 DIAGNOSIS — G47.33 OSA ON CPAP: Primary | ICD-10-CM

## 2025-07-23 DIAGNOSIS — E66.9 OBESITY (BMI 30-39.9): ICD-10-CM

## 2025-07-23 PROCEDURE — 1159F MED LIST DOCD IN RCRD: CPT

## 2025-07-23 PROCEDURE — 99214 OFFICE O/P EST MOD 30 MIN: CPT

## 2025-07-23 PROCEDURE — 3078F DIAST BP <80 MM HG: CPT

## 2025-07-23 PROCEDURE — 3074F SYST BP LT 130 MM HG: CPT

## 2025-07-23 PROCEDURE — 1090F PRES/ABSN URINE INCON ASSESS: CPT

## 2025-07-23 PROCEDURE — G8417 CALC BMI ABV UP PARAM F/U: HCPCS

## 2025-07-23 PROCEDURE — 3017F COLORECTAL CA SCREEN DOC REV: CPT

## 2025-07-23 PROCEDURE — 1123F ACP DISCUSS/DSCN MKR DOCD: CPT

## 2025-07-23 PROCEDURE — 1036F TOBACCO NON-USER: CPT

## 2025-07-23 PROCEDURE — G8399 PT W/DXA RESULTS DOCUMENT: HCPCS

## 2025-07-23 PROCEDURE — G8427 DOCREV CUR MEDS BY ELIG CLIN: HCPCS

## 2025-07-23 RX ORDER — POTASSIUM CHLORIDE 750 MG/1
TABLET, EXTENDED RELEASE ORAL
COMMUNITY
Start: 2025-07-08

## 2025-07-23 ASSESSMENT — ENCOUNTER SYMPTOMS
COUGH: 0
SINUS PRESSURE: 0
RHINORRHEA: 0
SHORTNESS OF BREATH: 0
SINUS PAIN: 0
WHEEZING: 0
SORE THROAT: 0

## 2025-08-05 ENCOUNTER — HOSPITAL ENCOUNTER (EMERGENCY)
Age: 65
Discharge: HOME | End: 2025-08-05
Payer: MEDICARE

## 2025-08-05 VITALS
SYSTOLIC BLOOD PRESSURE: 139 MMHG | RESPIRATION RATE: 18 BRPM | BODY MASS INDEX: 30.86 KG/M2 | DIASTOLIC BLOOD PRESSURE: 77 MMHG | TEMPERATURE: 97.9 F | HEART RATE: 78 BPM | OXYGEN SATURATION: 96 % | HEIGHT: 65 IN | WEIGHT: 185.2 LBS

## 2025-08-05 DIAGNOSIS — L30.9 DERMATITIS: Primary | ICD-10-CM

## 2025-08-05 DIAGNOSIS — L29.9 PRURITIC DISORDER: ICD-10-CM

## 2025-08-05 PROCEDURE — 99213 OFFICE O/P EST LOW 20 MIN: CPT

## 2025-08-05 PROCEDURE — 99213 OFFICE O/P EST LOW 20 MIN: CPT | Performed by: NURSE PRACTITIONER

## 2025-08-05 RX ORDER — FLUCONAZOLE 150 MG/1
150 TABLET ORAL WEEKLY
Qty: 4 TABLET | Refills: 0 | Status: SHIPPED | OUTPATIENT
Start: 2025-08-05 | End: 2025-08-27

## 2025-08-05 ASSESSMENT — ENCOUNTER SYMPTOMS
CHEST TIGHTNESS: 0
SORE THROAT: 0
SHORTNESS OF BREATH: 0
COUGH: 0
DIARRHEA: 0
RHINORRHEA: 0
VOMITING: 0
NAUSEA: 0

## 2025-08-25 ENCOUNTER — OFFICE VISIT (OUTPATIENT)
Age: 65
End: 2025-08-25
Payer: MEDICARE

## 2025-08-25 VITALS
BODY MASS INDEX: 31 KG/M2 | WEIGHT: 186.1 LBS | OXYGEN SATURATION: 99 % | DIASTOLIC BLOOD PRESSURE: 70 MMHG | HEIGHT: 65 IN | HEART RATE: 74 BPM | SYSTOLIC BLOOD PRESSURE: 120 MMHG

## 2025-08-25 DIAGNOSIS — M45.0 ANKYLOSING SPONDYLITIS OF MULTIPLE SITES IN SPINE (HCC): Primary | ICD-10-CM

## 2025-08-25 PROCEDURE — G2211 COMPLEX E/M VISIT ADD ON: HCPCS | Performed by: INTERNAL MEDICINE

## 2025-08-25 PROCEDURE — 99213 OFFICE O/P EST LOW 20 MIN: CPT | Performed by: INTERNAL MEDICINE

## 2025-08-25 PROCEDURE — 3074F SYST BP LT 130 MM HG: CPT | Performed by: INTERNAL MEDICINE

## 2025-08-25 PROCEDURE — 1159F MED LIST DOCD IN RCRD: CPT | Performed by: INTERNAL MEDICINE

## 2025-08-25 PROCEDURE — 1125F AMNT PAIN NOTED PAIN PRSNT: CPT | Performed by: INTERNAL MEDICINE

## 2025-08-25 PROCEDURE — 3017F COLORECTAL CA SCREEN DOC REV: CPT | Performed by: INTERNAL MEDICINE

## 2025-08-25 PROCEDURE — 1123F ACP DISCUSS/DSCN MKR DOCD: CPT | Performed by: INTERNAL MEDICINE

## 2025-08-25 PROCEDURE — 1090F PRES/ABSN URINE INCON ASSESS: CPT | Performed by: INTERNAL MEDICINE

## 2025-08-25 PROCEDURE — G8417 CALC BMI ABV UP PARAM F/U: HCPCS | Performed by: INTERNAL MEDICINE

## 2025-08-25 PROCEDURE — 99214 OFFICE O/P EST MOD 30 MIN: CPT | Performed by: INTERNAL MEDICINE

## 2025-08-25 PROCEDURE — 1036F TOBACCO NON-USER: CPT | Performed by: INTERNAL MEDICINE

## 2025-08-25 PROCEDURE — 3078F DIAST BP <80 MM HG: CPT | Performed by: INTERNAL MEDICINE

## 2025-08-25 PROCEDURE — G8427 DOCREV CUR MEDS BY ELIG CLIN: HCPCS | Performed by: INTERNAL MEDICINE

## 2025-08-25 PROCEDURE — G8399 PT W/DXA RESULTS DOCUMENT: HCPCS | Performed by: INTERNAL MEDICINE

## 2025-08-25 RX ORDER — IXEKIZUMAB 80 MG/ML
160 INJECTION, SOLUTION SUBCUTANEOUS ONCE
Qty: 1 ML | Refills: 0 | Status: SHIPPED | OUTPATIENT
Start: 2025-08-25 | End: 2025-08-26

## 2025-08-25 RX ORDER — IXEKIZUMAB 80 MG/ML
80 INJECTION, SOLUTION SUBCUTANEOUS
Qty: 1 ML | Refills: 5 | Status: SHIPPED | OUTPATIENT
Start: 2025-08-25 | End: 2025-08-26

## 2025-08-25 ASSESSMENT — LIFESTYLE VARIABLES
HOW OFTEN DO YOU HAVE A DRINK CONTAINING ALCOHOL: MONTHLY OR LESS
HOW MANY STANDARD DRINKS CONTAINING ALCOHOL DO YOU HAVE ON A TYPICAL DAY: 1 OR 2

## 2025-08-25 ASSESSMENT — ENCOUNTER SYMPTOMS
NAUSEA: 0
COUGH: 0
VOMITING: 0
ABDOMINAL PAIN: 0
SHORTNESS OF BREATH: 0

## 2025-08-25 ASSESSMENT — JOINT PAIN
TOTAL NUMBER OF TENDER JOINTS: 12
TOTAL NUMBER OF SWOLLEN JOINTS: 0

## 2025-08-26 ENCOUNTER — TELEPHONE (OUTPATIENT)
Age: 65
End: 2025-08-26

## 2025-08-26 DIAGNOSIS — M45.0 ANKYLOSING SPONDYLITIS OF MULTIPLE SITES IN SPINE (HCC): Primary | ICD-10-CM

## 2025-08-26 RX ORDER — SECUKINUMAB 150 MG/ML
150 INJECTION SUBCUTANEOUS
Qty: 1 ML | Refills: 5 | Status: ACTIVE | OUTPATIENT
Start: 2025-08-26 | End: 2026-02-22

## 2025-08-26 RX ORDER — SECUKINUMAB 150 MG/ML
150 INJECTION SUBCUTANEOUS WEEKLY
Qty: 5 ML | Refills: 0 | Status: ACTIVE | OUTPATIENT
Start: 2025-08-26 | End: 2025-09-24